# Patient Record
Sex: FEMALE | Race: WHITE | NOT HISPANIC OR LATINO
[De-identification: names, ages, dates, MRNs, and addresses within clinical notes are randomized per-mention and may not be internally consistent; named-entity substitution may affect disease eponyms.]

---

## 2017-01-04 ENCOUNTER — APPOINTMENT (OUTPATIENT)
Dept: ORTHOPEDIC SURGERY | Facility: CLINIC | Age: 82
End: 2017-01-04

## 2017-01-04 VITALS
WEIGHT: 132 LBS | BODY MASS INDEX: 27.71 KG/M2 | DIASTOLIC BLOOD PRESSURE: 86 MMHG | HEART RATE: 101 BPM | HEIGHT: 58 IN | SYSTOLIC BLOOD PRESSURE: 124 MMHG

## 2017-02-15 ENCOUNTER — APPOINTMENT (OUTPATIENT)
Dept: ORTHOPEDIC SURGERY | Facility: CLINIC | Age: 82
End: 2017-02-15

## 2017-02-15 VITALS
SYSTOLIC BLOOD PRESSURE: 123 MMHG | HEART RATE: 62 BPM | HEIGHT: 58 IN | WEIGHT: 132 LBS | DIASTOLIC BLOOD PRESSURE: 79 MMHG | BODY MASS INDEX: 27.71 KG/M2

## 2017-03-06 ENCOUNTER — APPOINTMENT (OUTPATIENT)
Dept: ORTHOPEDIC SURGERY | Facility: CLINIC | Age: 82
End: 2017-03-06

## 2017-03-06 VITALS — HEIGHT: 58 IN | WEIGHT: 132 LBS | BODY MASS INDEX: 27.71 KG/M2

## 2017-03-06 DIAGNOSIS — M18.11 UNILATERAL PRIMARY OSTEOARTHRITIS OF FIRST CARPOMETACARPAL JOINT, RIGHT HAND: ICD-10-CM

## 2017-03-06 DIAGNOSIS — M65.312 TRIGGER THUMB, LEFT THUMB: ICD-10-CM

## 2017-03-06 DIAGNOSIS — M18.12 UNILATERAL PRIMARY OSTEOARTHRITIS OF FIRST CARPOMETACARPAL JOINT, LEFT HAND: ICD-10-CM

## 2017-03-06 DIAGNOSIS — M17.12 UNILATERAL PRIMARY OSTEOARTHRITIS, LEFT KNEE: ICD-10-CM

## 2017-04-17 ENCOUNTER — APPOINTMENT (OUTPATIENT)
Dept: ORTHOPEDIC SURGERY | Facility: CLINIC | Age: 82
End: 2017-04-17

## 2017-04-17 VITALS
BODY MASS INDEX: 27.29 KG/M2 | DIASTOLIC BLOOD PRESSURE: 78 MMHG | HEART RATE: 89 BPM | SYSTOLIC BLOOD PRESSURE: 119 MMHG | HEIGHT: 58 IN | WEIGHT: 130 LBS

## 2017-04-17 DIAGNOSIS — M54.16 RADICULOPATHY, LUMBAR REGION: ICD-10-CM

## 2017-04-17 DIAGNOSIS — M48.06 SPINAL STENOSIS, LUMBAR REGION: ICD-10-CM

## 2017-06-19 ENCOUNTER — APPOINTMENT (OUTPATIENT)
Dept: ORTHOPEDIC SURGERY | Facility: CLINIC | Age: 82
End: 2017-06-19

## 2017-07-12 ENCOUNTER — APPOINTMENT (OUTPATIENT)
Dept: ORTHOPEDIC SURGERY | Facility: CLINIC | Age: 82
End: 2017-07-12

## 2017-07-12 VITALS
HEART RATE: 79 BPM | HEIGHT: 58 IN | WEIGHT: 123 LBS | BODY MASS INDEX: 25.82 KG/M2 | SYSTOLIC BLOOD PRESSURE: 116 MMHG | DIASTOLIC BLOOD PRESSURE: 75 MMHG

## 2017-10-18 ENCOUNTER — APPOINTMENT (OUTPATIENT)
Dept: ORTHOPEDIC SURGERY | Facility: CLINIC | Age: 82
End: 2017-10-18
Payer: MEDICARE

## 2017-10-18 VITALS
BODY MASS INDEX: 26.45 KG/M2 | HEIGHT: 58 IN | SYSTOLIC BLOOD PRESSURE: 124 MMHG | HEART RATE: 86 BPM | WEIGHT: 126 LBS | DIASTOLIC BLOOD PRESSURE: 82 MMHG

## 2017-10-18 DIAGNOSIS — M47.817 SPONDYLOSIS W/OUT MYELOPATHY OR RADICULOPATHY, LUMBOSACRAL REGION: ICD-10-CM

## 2017-10-18 DIAGNOSIS — M70.71 OTHER BURSITIS OF HIP, RIGHT HIP: ICD-10-CM

## 2017-10-18 PROCEDURE — 99213 OFFICE O/P EST LOW 20 MIN: CPT

## 2017-12-11 ENCOUNTER — APPOINTMENT (OUTPATIENT)
Dept: ORTHOPEDIC SURGERY | Facility: CLINIC | Age: 82
End: 2017-12-11

## 2018-03-14 DIAGNOSIS — Z83.79 FAMILY HISTORY OF OTHER DISEASES OF THE DIGESTIVE SYSTEM: ICD-10-CM

## 2018-03-14 DIAGNOSIS — Z82.49 FAMILY HISTORY OF ISCHEMIC HEART DISEASE AND OTHER DISEASES OF THE CIRCULATORY SYSTEM: ICD-10-CM

## 2018-03-14 DIAGNOSIS — Z85.828 PERSONAL HISTORY OF OTHER MALIGNANT NEOPLASM OF SKIN: ICD-10-CM

## 2018-03-14 DIAGNOSIS — Z87.19 PERSONAL HISTORY OF OTHER DISEASES OF THE DIGESTIVE SYSTEM: ICD-10-CM

## 2018-03-14 DIAGNOSIS — R41.3 OTHER AMNESIA: ICD-10-CM

## 2018-03-14 DIAGNOSIS — Z83.3 FAMILY HISTORY OF DIABETES MELLITUS: ICD-10-CM

## 2018-03-14 DIAGNOSIS — Z81.8 FAMILY HISTORY OF OTHER MENTAL AND BEHAVIORAL DISORDERS: ICD-10-CM

## 2018-03-14 DIAGNOSIS — Z83.518 FAMILY HISTORY OF OTHER SPECIFIED EYE DISORDER: ICD-10-CM

## 2018-04-17 ENCOUNTER — APPOINTMENT (OUTPATIENT)
Dept: NEUROLOGY | Facility: CLINIC | Age: 83
End: 2018-04-17
Payer: MEDICARE

## 2018-04-17 VITALS
WEIGHT: 119 LBS | BODY MASS INDEX: 24.98 KG/M2 | HEART RATE: 101 BPM | SYSTOLIC BLOOD PRESSURE: 124 MMHG | HEIGHT: 58 IN | DIASTOLIC BLOOD PRESSURE: 87 MMHG

## 2018-04-17 DIAGNOSIS — H35.30 UNSPECIFIED MACULAR DEGENERATION: ICD-10-CM

## 2018-04-17 PROCEDURE — 96116 NUBHVL XM PHYS/QHP 1ST HR: CPT | Mod: 59

## 2018-04-17 PROCEDURE — 99205 OFFICE O/P NEW HI 60 MIN: CPT | Mod: 25

## 2018-04-27 ENCOUNTER — OTHER (OUTPATIENT)
Age: 83
End: 2018-04-27

## 2018-04-30 ENCOUNTER — APPOINTMENT (OUTPATIENT)
Dept: MRI IMAGING | Facility: CLINIC | Age: 83
End: 2018-04-30
Payer: MEDICARE

## 2018-04-30 ENCOUNTER — OUTPATIENT (OUTPATIENT)
Dept: OUTPATIENT SERVICES | Facility: HOSPITAL | Age: 83
LOS: 1 days | End: 2018-04-30
Payer: MEDICARE

## 2018-04-30 DIAGNOSIS — Z00.8 ENCOUNTER FOR OTHER GENERAL EXAMINATION: ICD-10-CM

## 2018-04-30 DIAGNOSIS — F03.90 UNSPECIFIED DEMENTIA WITHOUT BEHAVIORAL DISTURBANCE: ICD-10-CM

## 2018-04-30 PROCEDURE — 70551 MRI BRAIN STEM W/O DYE: CPT | Mod: 26

## 2018-04-30 PROCEDURE — 70551 MRI BRAIN STEM W/O DYE: CPT

## 2018-07-31 ENCOUNTER — APPOINTMENT (OUTPATIENT)
Dept: NEUROLOGY | Facility: CLINIC | Age: 83
End: 2018-07-31

## 2018-10-22 ENCOUNTER — APPOINTMENT (OUTPATIENT)
Dept: ORTHOPEDIC SURGERY | Facility: CLINIC | Age: 83
End: 2018-10-22

## 2018-12-04 ENCOUNTER — APPOINTMENT (OUTPATIENT)
Dept: INTERNAL MEDICINE | Facility: CLINIC | Age: 83
End: 2018-12-04
Payer: MEDICARE

## 2018-12-04 ENCOUNTER — NON-APPOINTMENT (OUTPATIENT)
Age: 83
End: 2018-12-04

## 2018-12-04 VITALS
BODY MASS INDEX: 22.25 KG/M2 | OXYGEN SATURATION: 98 % | WEIGHT: 106 LBS | SYSTOLIC BLOOD PRESSURE: 130 MMHG | TEMPERATURE: 99 F | HEART RATE: 90 BPM | DIASTOLIC BLOOD PRESSURE: 80 MMHG | HEIGHT: 58 IN

## 2018-12-04 VITALS — SYSTOLIC BLOOD PRESSURE: 112 MMHG | DIASTOLIC BLOOD PRESSURE: 80 MMHG

## 2018-12-04 DIAGNOSIS — M25.562 PAIN IN LEFT KNEE: ICD-10-CM

## 2018-12-04 DIAGNOSIS — Z96.652 PRESENCE OF LEFT ARTIFICIAL KNEE JOINT: ICD-10-CM

## 2018-12-04 DIAGNOSIS — Z96.649 PRESENCE OF UNSPECIFIED ARTIFICIAL HIP JOINT: ICD-10-CM

## 2018-12-04 DIAGNOSIS — M19.90 UNSPECIFIED OSTEOARTHRITIS, UNSPECIFIED SITE: ICD-10-CM

## 2018-12-04 DIAGNOSIS — R73.02 IMPAIRED GLUCOSE TOLERANCE (ORAL): ICD-10-CM

## 2018-12-04 DIAGNOSIS — Z23 ENCOUNTER FOR IMMUNIZATION: ICD-10-CM

## 2018-12-04 DIAGNOSIS — E78.9 DISORDER OF LIPOPROTEIN METABOLISM, UNSPECIFIED: ICD-10-CM

## 2018-12-04 DIAGNOSIS — S32.000S WEDGE COMPRESSION FRACTURE OF UNSPECIFIED LUMBAR VERTEBRA, SEQUELA: ICD-10-CM

## 2018-12-04 DIAGNOSIS — Z86.79 PERSONAL HISTORY OF OTHER DISEASES OF THE CIRCULATORY SYSTEM: ICD-10-CM

## 2018-12-04 DIAGNOSIS — M81.0 AGE-RELATED OSTEOPOROSIS W/OUT CURRENT PATHOLOGICAL FRACTURE: ICD-10-CM

## 2018-12-04 DIAGNOSIS — Z00.00 ENCOUNTER FOR GENERAL ADULT MEDICAL EXAMINATION W/OUT ABNORMAL FINDINGS: ICD-10-CM

## 2018-12-04 DIAGNOSIS — M22.40 CHONDROMALACIA PATELLAE, UNSPECIFIED KNEE: ICD-10-CM

## 2018-12-04 DIAGNOSIS — E55.9 VITAMIN D DEFICIENCY, UNSPECIFIED: ICD-10-CM

## 2018-12-04 PROCEDURE — 82270 OCCULT BLOOD FECES: CPT

## 2018-12-04 PROCEDURE — 90662 IIV NO PRSV INCREASED AG IM: CPT

## 2018-12-04 PROCEDURE — 93000 ELECTROCARDIOGRAM COMPLETE: CPT

## 2018-12-04 PROCEDURE — G0008: CPT

## 2018-12-04 PROCEDURE — G0439: CPT

## 2018-12-05 NOTE — ASSESSMENT
[FreeTextEntry1] : Pt is overdue on all HCM.  She is refusing Pap smear and colonoscopy.  I gave her Rx to repeat mammogram, breast sonogram and DEXA scan.  She was reminded to see ophthalmology, as she has h/o of both glaucoma and macula degeneration, and probably have not follow up for some time.  She was also reminded to have routine dental care and skin exam with dermatologist.

## 2018-12-05 NOTE — HEALTH RISK ASSESSMENT
[Very Good] : ~his/her~  mood as very good [No falls in past year] : Patient reported no falls in the past year [0] : 2) Feeling down, depressed, or hopeless: Not at all (0) [Change in mental status noted] : Change in mental status noted [Alone] : lives alone [Retired] : retired [High School] : high school [] :  [Feels Safe at Home] : Feels safe at home [Fully functional (bathing, dressing, toileting, transferring, walking, feeding)] : Fully functional (bathing, dressing, toileting, transferring, walking, feeding) [Fully functional (using the telephone, shopping, preparing meals, housekeeping, doing laundry, using] : Fully functional and needs no help or supervision to perform IADLs (using the telephone, shopping, preparing meals, housekeeping, doing laundry, using transportation, managing medications and managing finances) [Seat Belt] :  uses seat belt [Discussed at today's visit] : Advance Directives Discussed at today's visit [Relationship: ___] : Relationship: [unfilled] [] : No [Reports changes in hearing] : Reports no changes in hearing [Reports changes in vision] : Reports no changes in vision [Reports changes in dental health] : Reports no changes in dental health [Smoke Detector] : no smoke detector [Carbon Monoxide Detector] : no carbon monoxide detector [ColonoscopyDate] : 12/2008 [de-identified] : eye exam - every 3-4 months,  [de-identified] : dentist - ?2018

## 2018-12-05 NOTE — REVIEW OF SYSTEMS
[Joint Pain] : joint pain [Unsteady Walking] : ataxia [Negative] : Heme/Lymph [Fever] : no fever [Chills] : no chills [Chest Pain] : no chest pain [Palpitations] : no palpitations [Lower Ext Edema] : no lower extremity edema [Shortness Of Breath] : no shortness of breath [Wheezing] : no wheezing [Cough] : no cough [Abdominal Pain] : no abdominal pain [Nausea] : no nausea [Constipation] : no constipation [Diarrhea] : diarrhea [Vomiting] : no vomiting [Heartburn] : no heartburn [Melena] : no melena [Dysuria] : no dysuria [Incontinence] : no incontinence [Nocturia] : no nocturia [Hematuria] : no hematuria [Joint Stiffness] : no joint stiffness [Joint Swelling] : no joint swelling [Muscle Pain] : no muscle pain [Back Pain] : no back pain [Itching] : no itching [Mole Changes] : no mole changes [Skin Rash] : no skin rash [Headache] : no headache [Dizziness] : no dizziness [Fainting] : no fainting [Insomnia] : no insomnia [Anxiety] : no anxiety [Depression] : no depression [Easy Bleeding] : no easy bleeding [Easy Bruising] : no easy bruising [Swollen Glands] : no swollen glands [FreeTextEntry2] : Lost about 20 pounds in the past 2 years, [FreeTextEntry3] : wears reading glasses, [FreeTextEntry9] : Occ hip and knee pain, takes Tylenol as needed, [de-identified] : unsteady only when she's tired,

## 2018-12-05 NOTE — PHYSICAL EXAM
[No Acute Distress] : no acute distress [Well Nourished] : well nourished [Well Developed] : well developed [Normal Sclera/Conjunctiva] : normal sclera/conjunctiva [PERRL] : pupils equal round and reactive to light [EOMI] : extraocular movements intact [Normal Outer Ear/Nose] : the outer ears and nose were normal in appearance [Normal Oropharynx] : the oropharynx was normal [Normal TMs] : both tympanic membranes were normal [Normal Nasal Mucosa] : the nasal mucosa was normal [No JVD] : no jugular venous distention [Supple] : supple [No Lymphadenopathy] : no lymphadenopathy [No Respiratory Distress] : no respiratory distress  [Clear to Auscultation] : lungs were clear to auscultation bilaterally [Normal Rate] : normal rate  [Regular Rhythm] : with a regular rhythm [Normal S1, S2] : normal S1 and S2 [No Carotid Bruits] : no carotid bruits [Pedal Pulses Present] : the pedal pulses are present [No Edema] : there was no peripheral edema [No Extremity Clubbing/Cyanosis] : no extremity clubbing/cyanosis [Normal Appearance] : normal in appearance [No Masses] : no palpable masses [No Nipple Discharge] : no nipple discharge [No Axillary Lymphadenopathy] : no axillary lymphadenopathy [Soft] : abdomen soft [Non Tender] : non-tender [Non-distended] : non-distended [Normal Bowel Sounds] : normal bowel sounds [Normal Sphincter Tone] : normal sphincter tone [No Mass] : no mass [Normal Supraclavicular Nodes] : no supraclavicular lymphadenopathy [Normal Axillary Nodes] : no axillary lymphadenopathy [Normal Posterior Cervical Nodes] : no posterior cervical lymphadenopathy [Normal Anterior Cervical Nodes] : no anterior cervical lymphadenopathy [No CVA Tenderness] : no CVA  tenderness [No Spinal Tenderness] : no spinal tenderness [No Joint Swelling] : no joint swelling [Grossly Normal Strength/Tone] : grossly normal strength/tone [No Rash] : no rash [Normal Gait] : normal gait [Coordination Grossly Intact] : coordination grossly intact [No Focal Deficits] : no focal deficits [Speech Grossly Normal] : speech grossly normal [Normal Affect] : the affect was normal [Alert and Oriented x3] : oriented to person, place, and time [Normal Mood] : the mood was normal [Stool Occult Blood] : stool negative for occult blood [de-identified] : Elderly female,

## 2018-12-05 NOTE — HISTORY OF PRESENT ILLNESS
[Other: _____] : [unfilled] [de-identified] : Pt presented for PE.  Last PE was 2 years ago in 9/2016.\par \par In the last few months, pt has been having more problem with memory. She is now under the care of a neurologist and diagnosed with dementia, she will follow up in 2 weeks for possible medication.  Her daughter in law in now going to accompany her to all doctor's visit.  They are trying to see if her long term care plan will allow hiring of a HHA.\par \par Pt has not sen me for 2 years, she thinks that she sees her ophthalmologist every 3-4 months, but she may not have been there for sometime.  She is not taking any medications at all at the present time.  She doesn't even use her eye drops, even though they are in her refrigerator.\par \par Pt lost some weight in the past 2 years, but does not know how much.\par \par She is active and denied any discomfort.  She likes to go out dancing.  She was still driving herself to go dancing until the last couple of months.  the patient has OA, and sees ortho quite often, according to electronic records, but she does not remember going there that often.\par \par She would like to have Flu vaccine today.

## 2018-12-13 ENCOUNTER — LABORATORY RESULT (OUTPATIENT)
Age: 83
End: 2018-12-13

## 2018-12-14 LAB
25(OH)D3 SERPL-MCNC: 24.1 NG/ML
ALBUMIN SERPL ELPH-MCNC: 4 G/DL
ALP BLD-CCNC: 78 U/L
ALT SERPL-CCNC: 16 U/L
ANION GAP SERPL CALC-SCNC: 6 MMOL/L
AST SERPL-CCNC: 15 U/L
BASOPHILS # BLD AUTO: 0.01 K/UL
BASOPHILS NFR BLD AUTO: 0.3 %
BILIRUB SERPL-MCNC: 0.5 MG/DL
BUN SERPL-MCNC: 15 MG/DL
CALCIUM SERPL-MCNC: 9.3 MG/DL
CHLORIDE SERPL-SCNC: 104 MMOL/L
CHOLEST SERPL-MCNC: 169 MG/DL
CHOLEST/HDLC SERPL: 2.8 RATIO
CO2 SERPL-SCNC: 27 MMOL/L
CREAT SERPL-MCNC: 0.58 MG/DL
EOSINOPHIL # BLD AUTO: 0.03 K/UL
EOSINOPHIL NFR BLD AUTO: 0.8 %
GLUCOSE SERPL-MCNC: 109 MG/DL
HBA1C MFR BLD HPLC: 5.4 %
HCT VFR BLD CALC: 40.7 %
HDLC SERPL-MCNC: 60 MG/DL
HGB BLD-MCNC: 13.3 G/DL
IMM GRANULOCYTES NFR BLD AUTO: 0 %
LDLC SERPL CALC-MCNC: 88 MG/DL
LYMPHOCYTES # BLD AUTO: 0.96 K/UL
LYMPHOCYTES NFR BLD AUTO: 25.2 %
MAN DIFF?: NORMAL
MCHC RBC-ENTMCNC: 29.8 PG
MCHC RBC-ENTMCNC: 32.7 GM/DL
MCV RBC AUTO: 91.1 FL
MONOCYTES # BLD AUTO: 0.28 K/UL
MONOCYTES NFR BLD AUTO: 7.3 %
NEUTROPHILS # BLD AUTO: 2.53 K/UL
NEUTROPHILS NFR BLD AUTO: 66.4 %
PLATELET # BLD AUTO: 175 K/UL
POTASSIUM SERPL-SCNC: 3.9 MMOL/L
PROT SERPL-MCNC: 6.1 G/DL
RBC # BLD: 4.47 M/UL
RBC # FLD: 13.5 %
SODIUM SERPL-SCNC: 137 MMOL/L
TRIGL SERPL-MCNC: 104 MG/DL
TSH SERPL-ACNC: 1.25 UIU/ML
VIT B12 SERPL-MCNC: 192 PG/ML
WBC # FLD AUTO: 3.81 K/UL

## 2018-12-17 LAB — RPR SER-TITR: NORMAL

## 2018-12-19 ENCOUNTER — APPOINTMENT (OUTPATIENT)
Dept: NEUROLOGY | Facility: CLINIC | Age: 83
End: 2018-12-19
Payer: MEDICARE

## 2018-12-19 VITALS
HEIGHT: 58 IN | DIASTOLIC BLOOD PRESSURE: 79 MMHG | BODY MASS INDEX: 22.25 KG/M2 | HEART RATE: 79 BPM | SYSTOLIC BLOOD PRESSURE: 135 MMHG | WEIGHT: 106 LBS

## 2018-12-19 DIAGNOSIS — F32.9 MAJOR DEPRESSIVE DISORDER, SINGLE EPISODE, UNSPECIFIED: ICD-10-CM

## 2018-12-19 DIAGNOSIS — F02.80 ALZHEIMER'S DISEASE WITH LATE ONSET: ICD-10-CM

## 2018-12-19 DIAGNOSIS — E53.8 DEFICIENCY OF OTHER SPECIFIED B GROUP VITAMINS: ICD-10-CM

## 2018-12-19 DIAGNOSIS — E55.9 VITAMIN D DEFICIENCY, UNSPECIFIED: ICD-10-CM

## 2018-12-19 DIAGNOSIS — G30.1 ALZHEIMER'S DISEASE WITH LATE ONSET: ICD-10-CM

## 2018-12-19 PROCEDURE — 99215 OFFICE O/P EST HI 40 MIN: CPT

## 2018-12-19 RX ORDER — DONEPEZIL HYDROCHLORIDE 5 MG/1
5 TABLET ORAL
Qty: 30 | Refills: 5 | Status: ACTIVE | COMMUNITY
Start: 2018-12-19 | End: 1900-01-01

## 2019-02-01 ENCOUNTER — TRANSCRIPTION ENCOUNTER (OUTPATIENT)
Age: 84
End: 2019-02-01

## 2019-02-01 ENCOUNTER — INPATIENT (INPATIENT)
Facility: HOSPITAL | Age: 84
LOS: 5 days | Discharge: NURSING, REHAB OR LONG-TERM CARE | DRG: 480 | End: 2019-02-07
Attending: ORTHOPAEDIC SURGERY | Admitting: ORTHOPAEDIC SURGERY
Payer: MEDICARE

## 2019-02-01 VITALS
SYSTOLIC BLOOD PRESSURE: 127 MMHG | WEIGHT: 104.94 LBS | RESPIRATION RATE: 18 BRPM | HEART RATE: 85 BPM | HEIGHT: 66 IN | OXYGEN SATURATION: 96 % | DIASTOLIC BLOOD PRESSURE: 66 MMHG

## 2019-02-01 DIAGNOSIS — Z96.652 PRESENCE OF LEFT ARTIFICIAL KNEE JOINT: Chronic | ICD-10-CM

## 2019-02-01 DIAGNOSIS — S72.002A FRACTURE OF UNSPECIFIED PART OF NECK OF LEFT FEMUR, INITIAL ENCOUNTER FOR CLOSED FRACTURE: ICD-10-CM

## 2019-02-01 DIAGNOSIS — Z96.641 PRESENCE OF RIGHT ARTIFICIAL HIP JOINT: Chronic | ICD-10-CM

## 2019-02-01 LAB
ALBUMIN SERPL ELPH-MCNC: 3.7 G/DL — SIGNIFICANT CHANGE UP (ref 3.3–5)
ALP SERPL-CCNC: 74 U/L — SIGNIFICANT CHANGE UP (ref 40–120)
ALT FLD-CCNC: 15 U/L — SIGNIFICANT CHANGE UP (ref 10–45)
ANION GAP SERPL CALC-SCNC: 12 MMOL/L — SIGNIFICANT CHANGE UP (ref 5–17)
APTT BLD: 28.6 SEC — SIGNIFICANT CHANGE UP (ref 27.5–36.3)
AST SERPL-CCNC: 9 U/L — LOW (ref 10–40)
BASE EXCESS BLDV CALC-SCNC: 6 MMOL/L — HIGH (ref -2–2)
BASOPHILS # BLD AUTO: 0 K/UL — SIGNIFICANT CHANGE UP (ref 0–0.2)
BASOPHILS NFR BLD AUTO: 0.4 % — SIGNIFICANT CHANGE UP (ref 0–2)
BILIRUB SERPL-MCNC: 0.4 MG/DL — SIGNIFICANT CHANGE UP (ref 0.2–1.2)
BLD GP AB SCN SERPL QL: NEGATIVE — SIGNIFICANT CHANGE UP
BUN SERPL-MCNC: 14 MG/DL — SIGNIFICANT CHANGE UP (ref 7–23)
CA-I SERPL-SCNC: 1.17 MMOL/L — SIGNIFICANT CHANGE UP (ref 1.12–1.3)
CALCIUM SERPL-MCNC: 8.9 MG/DL — SIGNIFICANT CHANGE UP (ref 8.4–10.5)
CHLORIDE BLDV-SCNC: 107 MMOL/L — SIGNIFICANT CHANGE UP (ref 96–108)
CHLORIDE SERPL-SCNC: 103 MMOL/L — SIGNIFICANT CHANGE UP (ref 96–108)
CO2 BLDV-SCNC: 33 MMOL/L — HIGH (ref 22–30)
CO2 SERPL-SCNC: 27 MMOL/L — SIGNIFICANT CHANGE UP (ref 22–31)
CREAT SERPL-MCNC: 0.58 MG/DL — SIGNIFICANT CHANGE UP (ref 0.5–1.3)
EOSINOPHIL # BLD AUTO: 0.1 K/UL — SIGNIFICANT CHANGE UP (ref 0–0.5)
EOSINOPHIL NFR BLD AUTO: 0.9 % — SIGNIFICANT CHANGE UP (ref 0–6)
GAS PNL BLDV: 135 MMOL/L — LOW (ref 136–145)
GAS PNL BLDV: SIGNIFICANT CHANGE UP
GAS PNL BLDV: SIGNIFICANT CHANGE UP
GLUCOSE BLDV-MCNC: 124 MG/DL — HIGH (ref 70–99)
GLUCOSE SERPL-MCNC: 135 MG/DL — HIGH (ref 70–99)
HCO3 BLDV-SCNC: 31 MMOL/L — HIGH (ref 21–29)
HCT VFR BLD CALC: 38 % — SIGNIFICANT CHANGE UP (ref 34.5–45)
HCT VFR BLDA CALC: 39 % — SIGNIFICANT CHANGE UP (ref 39–50)
HGB BLD CALC-MCNC: 12.7 G/DL — SIGNIFICANT CHANGE UP (ref 11.5–15.5)
HGB BLD-MCNC: 13.1 G/DL — SIGNIFICANT CHANGE UP (ref 11.5–15.5)
INR BLD: 1.14 RATIO — SIGNIFICANT CHANGE UP (ref 0.88–1.16)
LACTATE BLDV-MCNC: 1.2 MMOL/L — SIGNIFICANT CHANGE UP (ref 0.7–2)
LYMPHOCYTES # BLD AUTO: 1 K/UL — SIGNIFICANT CHANGE UP (ref 1–3.3)
LYMPHOCYTES # BLD AUTO: 14.8 % — SIGNIFICANT CHANGE UP (ref 13–44)
MCHC RBC-ENTMCNC: 31.5 PG — SIGNIFICANT CHANGE UP (ref 27–34)
MCHC RBC-ENTMCNC: 34.4 GM/DL — SIGNIFICANT CHANGE UP (ref 32–36)
MCV RBC AUTO: 91.5 FL — SIGNIFICANT CHANGE UP (ref 80–100)
MONOCYTES # BLD AUTO: 0.4 K/UL — SIGNIFICANT CHANGE UP (ref 0–0.9)
MONOCYTES NFR BLD AUTO: 6.6 % — SIGNIFICANT CHANGE UP (ref 2–14)
NEUTROPHILS # BLD AUTO: 5.2 K/UL — SIGNIFICANT CHANGE UP (ref 1.8–7.4)
NEUTROPHILS NFR BLD AUTO: 77.3 % — HIGH (ref 43–77)
PCO2 BLDV: 49 MMHG — SIGNIFICANT CHANGE UP (ref 35–50)
PH BLDV: 7.42 — SIGNIFICANT CHANGE UP (ref 7.35–7.45)
PLATELET # BLD AUTO: 195 K/UL — SIGNIFICANT CHANGE UP (ref 150–400)
PO2 BLDV: 24 MMHG — LOW (ref 25–45)
POTASSIUM BLDV-SCNC: 3.7 MMOL/L — SIGNIFICANT CHANGE UP (ref 3.5–5.3)
POTASSIUM SERPL-MCNC: 3.9 MMOL/L — SIGNIFICANT CHANGE UP (ref 3.5–5.3)
POTASSIUM SERPL-SCNC: 3.9 MMOL/L — SIGNIFICANT CHANGE UP (ref 3.5–5.3)
PROT SERPL-MCNC: 6.1 G/DL — SIGNIFICANT CHANGE UP (ref 6–8.3)
PROTHROM AB SERPL-ACNC: 13.1 SEC — HIGH (ref 10–12.9)
RBC # BLD: 4.16 M/UL — SIGNIFICANT CHANGE UP (ref 3.8–5.2)
RBC # FLD: 12 % — SIGNIFICANT CHANGE UP (ref 10.3–14.5)
RH IG SCN BLD-IMP: POSITIVE — SIGNIFICANT CHANGE UP
SAO2 % BLDV: 40 % — LOW (ref 67–88)
SODIUM SERPL-SCNC: 142 MMOL/L — SIGNIFICANT CHANGE UP (ref 135–145)
WBC # BLD: 6.8 K/UL — SIGNIFICANT CHANGE UP (ref 3.8–10.5)
WBC # FLD AUTO: 6.8 K/UL — SIGNIFICANT CHANGE UP (ref 3.8–10.5)

## 2019-02-01 PROCEDURE — 99285 EMERGENCY DEPT VISIT HI MDM: CPT | Mod: GC

## 2019-02-01 PROCEDURE — 71045 X-RAY EXAM CHEST 1 VIEW: CPT | Mod: 26

## 2019-02-01 PROCEDURE — 76377 3D RENDER W/INTRP POSTPROCES: CPT | Mod: 26

## 2019-02-01 PROCEDURE — 72192 CT PELVIS W/O DYE: CPT | Mod: 26

## 2019-02-01 PROCEDURE — 73502 X-RAY EXAM HIP UNI 2-3 VIEWS: CPT | Mod: 26,LT

## 2019-02-01 PROCEDURE — 73552 X-RAY EXAM OF FEMUR 2/>: CPT | Mod: 26,LT

## 2019-02-01 RX ORDER — MORPHINE SULFATE 50 MG/1
2 CAPSULE, EXTENDED RELEASE ORAL ONCE
Qty: 0 | Refills: 0 | Status: DISCONTINUED | OUTPATIENT
Start: 2019-02-01 | End: 2019-02-01

## 2019-02-01 RX ORDER — ACETAMINOPHEN 500 MG
650 TABLET ORAL EVERY 6 HOURS
Qty: 0 | Refills: 0 | Status: DISCONTINUED | OUTPATIENT
Start: 2019-02-01 | End: 2019-02-02

## 2019-02-01 RX ORDER — OXYCODONE HYDROCHLORIDE 5 MG/1
2.5 TABLET ORAL EVERY 4 HOURS
Qty: 0 | Refills: 0 | Status: DISCONTINUED | OUTPATIENT
Start: 2019-02-01 | End: 2019-02-02

## 2019-02-01 RX ORDER — SODIUM CHLORIDE 9 MG/ML
1000 INJECTION, SOLUTION INTRAVENOUS
Qty: 0 | Refills: 0 | Status: DISCONTINUED | OUTPATIENT
Start: 2019-02-01 | End: 2019-02-02

## 2019-02-01 RX ORDER — DIPHENHYDRAMINE HCL 50 MG
25 CAPSULE ORAL EVERY 8 HOURS
Qty: 0 | Refills: 0 | Status: DISCONTINUED | OUTPATIENT
Start: 2019-02-01 | End: 2019-02-02

## 2019-02-01 RX ORDER — ONDANSETRON 8 MG/1
4 TABLET, FILM COATED ORAL EVERY 6 HOURS
Qty: 0 | Refills: 0 | Status: DISCONTINUED | OUTPATIENT
Start: 2019-02-01 | End: 2019-02-02

## 2019-02-01 RX ORDER — TRAMADOL HYDROCHLORIDE 50 MG/1
50 TABLET ORAL EVERY 6 HOURS
Qty: 0 | Refills: 0 | Status: DISCONTINUED | OUTPATIENT
Start: 2019-02-01 | End: 2019-02-02

## 2019-02-01 RX ORDER — LANOLIN ALCOHOL/MO/W.PET/CERES
3 CREAM (GRAM) TOPICAL AT BEDTIME
Qty: 0 | Refills: 0 | Status: DISCONTINUED | OUTPATIENT
Start: 2019-02-01 | End: 2019-02-02

## 2019-02-01 RX ORDER — DOCUSATE SODIUM 100 MG
100 CAPSULE ORAL THREE TIMES A DAY
Qty: 0 | Refills: 0 | Status: DISCONTINUED | OUTPATIENT
Start: 2019-02-01 | End: 2019-02-02

## 2019-02-01 RX ORDER — ACETAMINOPHEN 500 MG
1000 TABLET ORAL ONCE
Qty: 0 | Refills: 0 | Status: COMPLETED | OUTPATIENT
Start: 2019-02-01 | End: 2019-02-02

## 2019-02-01 RX ORDER — CHLORHEXIDINE GLUCONATE 213 G/1000ML
1 SOLUTION TOPICAL ONCE
Qty: 0 | Refills: 0 | Status: COMPLETED | OUTPATIENT
Start: 2019-02-01 | End: 2019-02-02

## 2019-02-01 RX ORDER — OXYCODONE HYDROCHLORIDE 5 MG/1
5 TABLET ORAL EVERY 4 HOURS
Qty: 0 | Refills: 0 | Status: DISCONTINUED | OUTPATIENT
Start: 2019-02-01 | End: 2019-02-02

## 2019-02-01 RX ORDER — HEPARIN SODIUM 5000 [USP'U]/ML
5000 INJECTION INTRAVENOUS; SUBCUTANEOUS ONCE
Qty: 0 | Refills: 0 | Status: COMPLETED | OUTPATIENT
Start: 2019-02-01 | End: 2019-02-01

## 2019-02-01 RX ORDER — SODIUM CHLORIDE 9 MG/ML
1000 INJECTION INTRAMUSCULAR; INTRAVENOUS; SUBCUTANEOUS
Qty: 0 | Refills: 0 | Status: DISCONTINUED | OUTPATIENT
Start: 2019-02-01 | End: 2019-02-02

## 2019-02-01 RX ADMIN — HEPARIN SODIUM 5000 UNIT(S): 5000 INJECTION INTRAVENOUS; SUBCUTANEOUS at 23:59

## 2019-02-01 RX ADMIN — MORPHINE SULFATE 2 MILLIGRAM(S): 50 CAPSULE, EXTENDED RELEASE ORAL at 18:32

## 2019-02-01 RX ADMIN — MORPHINE SULFATE 2 MILLIGRAM(S): 50 CAPSULE, EXTENDED RELEASE ORAL at 19:28

## 2019-02-01 RX ADMIN — SODIUM CHLORIDE 200 MILLILITER(S): 9 INJECTION INTRAMUSCULAR; INTRAVENOUS; SUBCUTANEOUS at 18:34

## 2019-02-01 RX ADMIN — Medication 100 MILLIGRAM(S): at 23:59

## 2019-02-01 RX ADMIN — OXYCODONE HYDROCHLORIDE 5 MILLIGRAM(S): 5 TABLET ORAL at 22:33

## 2019-02-01 NOTE — CONSULT NOTE ADULT - SUBJECTIVE AND OBJECTIVE BOX
Patient is a 83y old  Female who presents with a chief complaint of Left hip fracture (01 Feb 2019 21:21)      HPI:  83y [facility] severe left hip pain and inability to ambulate.  Patient denies headstrike or LOC. Pt aide witnessed the fall, she states pt tripped over her feet and landed onto her L hip. Localizes pain to Left hip/femur. Patient denies radiation of pain. Patient denies numbness/tingling/burning in the LLE. No other bone/joint complaints. Patient is a community ambulator at baseline without assistive devices. Patient has no issues w/ADL's 2/2 dementia.         MEDICATIONS  (STANDING):  acetaminophen  IVPB .. 1000 milliGRAM(s) IV Intermittent once  chlorhexidine 4% Liquid 1 Application(s) Topical once  docusate sodium 100 milliGRAM(s) Oral three times a day  lactated ringers. 1000 milliLiter(s) (100 mL/Hr) IV Continuous <Continuous>  sodium chloride 0.9%. 1000 milliLiter(s) (200 mL/Hr) IV Continuous <Continuous>    MEDICATIONS  (PRN):  acetaminophen   Tablet .. 650 milliGRAM(s) Oral every 6 hours PRN Temp greater or equal to 38C (100.4F)  acetaminophen   Tablet .. 650 milliGRAM(s) Oral every 6 hours PRN Mild Pain (1 - 3)  diphenhydrAMINE 25 milliGRAM(s) Oral every 8 hours PRN Itching  melatonin 3 milliGRAM(s) Oral at bedtime PRN Insomnia  ondansetron Injectable 4 milliGRAM(s) IV Push every 6 hours PRN Nausea and/or Vomiting  oxyCODONE    IR 2.5 milliGRAM(s) Oral every 4 hours PRN Moderate Pain (4 - 6)  oxyCODONE    IR 5 milliGRAM(s) Oral every 4 hours PRN Severe Pain (7 - 10)  traMADol 50 milliGRAM(s) Oral every 6 hours PRN Mild Pain (1 - 3)      Allergies    No Known Allergies    Intolerances      PAST MEDICAL HISTORY:  Alzheimer disease    PAST SURGICAL HISTORY:  H/O total hip arthroplasty, right  History of total knee arthroplasty, left  No significant past surgical history      Diet:  ( x  ) Regular   (   ) Low Sodium   (   ) Low Cholesterol   (   ) Diabetic   (   ) Other    FAMILY HISTORY:  No pertinent family history in first degree relatives      SOCIAL HISTORY:    Tobacco Usage:  (   ) never smoked   ( x  ) former smoker   (   ) current smoker  (   ) pack years  (   ) last cigarette date  Alcohol Usage: none   Advanced Directives: (   ) None    (   ) DNR    (   ) DNI    (   ) Health Care Proxy:     REVIEW OF SYSTEM:  CONSTITUTIONAL: No fever, No change in weight, No fatigue  HEAD: No headache, No dizziness, No recent trauma  EYES: No eye pain, No visual disturbances, No discharge  ENT:  No difficulty hearing, No tinnitus, No vertigo, No sinus pain, No throat pain  NECK: No pain, No stiffness  BREASTS: No pain, No masses, No nipple discharge  RESPIRATORY: No cough, No wheezing, No chills, No hemoptysis, No shortness of breath at rest or exertional shortness of breath  CARDIOVASCULAR: No chest pain, No palpitations, No dizziness, No CHF, No arrhythmia, No cardiomegaly, No leg swelling  GASTROINTESTINAL: No abdominal, No epigastric pain. No nausea, No vomiting, No hematemesis, No diarrhea, No constipation. No melena, No hematochezia. No GERD  GENITOURINARY: No dysuria, No frequency, No hematuria, No incontinence, No nocturia, No hesitancy,  SKIN: No itching, No burning, No rashes, No lesions   LYMPH NODES: No history of enlarged glands  ENDOCRINE: No heat or cold intolerance, No hair loss. No osteoporosis, No thyroid disease  MUSCULOSKELETAL: No joint pain or swelling, No muscle, back, or extremity pain  (+) LEFT HIP PAIN  PSYCHIATRIC: No depression, No anxiety, No mood swings, No difficulty sleeping  HEME/LYMPH: No easy bruising, No anticoagulants, No bleeding disorder, No bleeding gums  ALLERGY AND IMMUNOLOGIC: No hives, No eczema  NEUROLOGICAL: No memory loss, No loss of strength, No numbness, No tremors    VITALS:  Vital Signs Last 24 Hrs  T(C): 37.5 (01 Feb 2019 23:37), Max: 37.5 (01 Feb 2019 23:37)  T(F): 99.5 (01 Feb 2019 23:37), Max: 99.5 (01 Feb 2019 23:37)  HR: 93 (01 Feb 2019 23:37) (81 - 98)  BP: 134/84 (01 Feb 2019 23:37) (127/66 - 151/89)  BP(mean): --  RR: 18 (01 Feb 2019 23:37) (16 - 18)  SpO2: 96% (01 Feb 2019 23:37) (96% - 100%)  I&O's Summary      PHYSICAL EXAM:  GENERAL: NAD, well nourished (+) DEMENTIA  HEAD:  Atraumatic  EYES: EOM, PERRLA, conjunctiva pink and sclera white  ENT: No tonsillar erythema, exudates, or enlargement, moist mucous membranes, good dentition, no lesions  NECK: Supple, No JVD, normal thyroid, carotids with normal upstrokes and no bruits  CHEST/LUNG: Clear to auscultation bilaterally, No rales, rhonchi, wheezing, or rubs  HEART: Regular rate and rhythm, No murmurs, rubs, or gallops  ABDOMEN: Soft, nondistended, no masses, guarding, tenderness or rebound, bowel sounds present  EXTREMITIES:  2+ Peripheral Pulses, No clubbing, cyanosis, or edema. LEFT HIP FRACTURE  LYMPH: No lymphadenopathy noted  SKIN: No rashes or lesions  NERVOUS SYSTEM:  Alert & Oriented X3, normal cognitive function. Motor Strength 5/5 right upper and right lower.  5/5 left upper and left lower extremities, DTRs 2+ intact and symmetric    LABS:  ekg    NSR NO ACUTE CHANGES    CXR;  CLEAR LUNGS  LUCENCINY LEFT HUMERAL HEAD R/O FRACTURE    CBC Full  -  ( 01 Feb 2019 18:42 )  WBC Count : 6.8 K/uL  Hemoglobin : 13.1 g/dL  Hematocrit : 38.0 %  Platelet Count - Automated : 195 K/uL  Mean Cell Volume : 91.5 fl  Mean Cell Hemoglobin : 31.5 pg  Mean Cell Hemoglobin Concentration : 34.4 gm/dL  Auto Neutrophil # : 5.2 K/uL  Auto Lymphocyte # : 1.0 K/uL  Auto Monocyte # : 0.4 K/uL  Auto Eosinophil # : 0.1 K/uL  Auto Basophil # : 0.0 K/uL  Auto Neutrophil % : 77.3 %  Auto Lymphocyte % : 14.8 %  Auto Monocyte % : 6.6 %  Auto Eosinophil % : 0.9 %  Auto Basophil % : 0.4 %    02-01    142  |  103  |  14  ----------------------------<  135<H>  3.9   |  27  |  0.58    Ca    8.9      01 Feb 2019 18:42    TPro  6.1  /  Alb  3.7  /  TBili  0.4  /  DBili  x   /  AST  9<L>  /  ALT  15  /  AlkPhos  74  02-01    LIVER FUNCTIONS - ( 01 Feb 2019 18:42 )  Alb: 3.7 g/dL / Pro: 6.1 g/dL / ALK PHOS: 74 U/L / ALT: 15 U/L / AST: 9 U/L / GGT: x           PT/INR - ( 01 Feb 2019 18:42 )   PT: 13.1 sec;   INR: 1.14 ratio         PTT - ( 01 Feb 2019 18:42 )  PTT:28.6 sec    CAPILLARY BLOOD GLUCOSE          RADIOLOGY & ADDITIONAL TESTS:    Consultant(s):    Care Discussed with Consultants/Other Providers [ ] YES  [ ] NO

## 2019-02-01 NOTE — CONSULT NOTE ADULT - PROBLEM/RECOMMENDATION-1
Anesthesia Evaluation     history of anesthetic complications: PONV  NPO Solid Status: > 8 hours  NPO Liquid Status: > 8 hours           Airway   Mallampati: I  TM distance: >3 FB  Neck ROM: full  Dental      Pulmonary - normal exam   (+) recent URI resolved,   Cardiovascular - normal exam    (+) hyperlipidemia,       Neuro/Psych  GI/Hepatic/Renal/Endo      Musculoskeletal     Abdominal    Substance History      OB/GYN          Other                        Anesthesia Plan    ASA 2       total IV anesthesia  intravenous induction   Anesthetic plan and risks discussed with patient.      
DISPLAY PLAN FREE TEXT

## 2019-02-01 NOTE — ED PROVIDER NOTE - OBJECTIVE STATEMENT
83F w/ pmh alzhemier dementia, no other pmh or psh p/w L hip pain s/p fall - was at home with daughter - and 83F w/ pmh alzhemier dementia, no other pmh or psh p/w L hip pain s/p fall - was at home with daughter - walking quickly, tripped accidentally over a chair and fell forward onto L hip - witnessed by daughter, no head trauma or other trauma, no LOC; denies pain elsewhere besides L hip, not in knee / ankle. No recent travel, medication change, illness, or hospitalization.

## 2019-02-01 NOTE — CONSULT NOTE ADULT - ASSESSMENT
83y Female [facility] left hip pain and inability to ambulate.  Patient denies headstrike or LOC. Pt aide witnessed the fall, she states pt tripped over her feet and landed onto her L hip. Localizes pain to Left hip/femur. Patient denies radiation of pain. Patient denies numbness/tingling/burning in the LLE. No other bone/joint complaints. Patient is a community ambulator at baseline without assistive devices. Patient has no issues w/ADL's 2/2 dementia. (01 Feb 2019 21:21  MEDICALLY STABLE TO PROCEED WITH SURGERY AS PLANNED.  OBTAIN LEFT SHOULDER XRAYS AS CHEST  FILM SUSPICIOUS FOR A LUCENCY SIGNIFYING  A POSSIBLE FRACTURE

## 2019-02-01 NOTE — ED PROVIDER NOTE - MEDICAL DECISION MAKING DETAILS
83F w/ [facility] fall - L hip pain - r/o fracture, give pain meds, reassess 83F w/ [facility] fall at home witnessed by home attendant injury pain tender left hip area unable to raise left leg no distal neuro vascular deficit No signs of head injury no chest wall tenderness Lungs clear heart regular   - L hip pain - on xray left femur intertroch fracture  fracture , give pain meds, admit --Sidhu

## 2019-02-01 NOTE — H&P ADULT - HISTORY OF PRESENT ILLNESS
83y Female [facility] left hip pain and inability to ambulate.  Patient denies headstrike or LOC. Pt aide witnessed the fall, she states pt tripped over her feet and landed onto her L hip. Localizes pain to Left hip/femur. Patient denies radiation of pain. Patient denies numbness/tingling/burning in the LLE. No other bone/joint complaints. Patient is a community ambulator at baseline without assistive devices. Patient has no issues w/ADL's 2/2 dementia.

## 2019-02-01 NOTE — ED ADULT NURSE REASSESSMENT NOTE - NS ED NURSE REASSESS COMMENT FT1
Bed assignment received. Called 7 tower to give pt report to nurse, per floor receiving RN unavailable to receive report at this time. Floor aware transport from floor will not be delayed, stated RN will call back for report when she is available. ED Charge RN aware of situation.
Ortho at bedside to see pt
Report received from ELO Blackwood. Pt resting comfortably with aide at bedside. Awaiting CT scan. VSS, reports no current pain. Safety maintained at all times, bed in lowest position, call bell in reach. Will continue to monitor closely.
Pt reports pain 10/10 in L hip. PRN pain medication administered as ordered. VSS. Awaiting transport to bed assignment.

## 2019-02-01 NOTE — CONSULT NOTE ADULT - PROBLEM SELECTOR RECOMMENDATION 2
Patient seen and examined labs and xrays reviewed.  THERE IS NO MEDICAL CONTRAINDICATION TO SURGERY AS PLANNED.

## 2019-02-01 NOTE — H&P ADULT - ASSESSMENT
83y Female with L Intertrochanteric hip fracture  - admit to ortho  - plan for IM nail tomorrow with Dr Clay  - Pain control  - NPO/IVF after midnight  - hold all chemical dvt ppx after midnight  - CBC/BMP/Coags/UA/T+S x2  - EKG/CXR  - Medical clearance  - bed rest

## 2019-02-01 NOTE — ED ADULT NURSE NOTE - OBJECTIVE STATEMENT
83 yrs old female was brought to the ER via ambulance for s/p fall. As per aide pt was upset after she was not able to locate her pocket bag at home was walking fast to her bedroom when she tripped and fell. Aide report that she witnessed the fall and pt fell on her left side. Aide denies pt hitting her head or LOC. Skin abrasion noted to right arm . Pt reports pain level of 10/10 only upon movement.

## 2019-02-01 NOTE — ED PROVIDER NOTE - PHYSICAL EXAMINATION
*GEN:   comfortable, in no acute distress, AOx3  *EYES:   pupils equally round and reactive to light, extra-occular movements intact  *HEENT:   airway patent, moist mucosal membranes  *CV:   regular rate and rhythm  *RESP:   clear to auscultation bilaterally, non-labored  *ABD:   soft, non-tender  *:   no cva/flank tenderness  *MSK:   L hip tenderness, severely limited ROM, good pulses / neurovascular status throughout  *SKIN:   dry, intact  *NEURO:   AOx3, cranial nerves intact throughout, strength 5/5, no focal loss of sensation, no pronator drift, finger/nose normal, ambulating w/ normal gait

## 2019-02-01 NOTE — ED PROVIDER NOTE - ATTENDING CONTRIBUTION TO CARE
I have seen and evaluated this patient with the resident.   I agree with the findings  unless other wise stated.  I have made appropriate changes in documentations where needed, After my face to face bedside evaluation, I am further  notinF w/ [facility] fall at home witnessed by home attendant injury pain tender left hip area unable to raise left leg no distal neuro vascular deficit No signs of head injury no chest wall tenderness Lungs clear heart regular   - L hip pain - on xray left femur intertroch fracture  fracture , give pain meds, admit --Sidhu

## 2019-02-02 DIAGNOSIS — G30.9 ALZHEIMER'S DISEASE, UNSPECIFIED: ICD-10-CM

## 2019-02-02 DIAGNOSIS — M25.512 PAIN IN LEFT SHOULDER: ICD-10-CM

## 2019-02-02 DIAGNOSIS — S72.002A FRACTURE OF UNSPECIFIED PART OF NECK OF LEFT FEMUR, INITIAL ENCOUNTER FOR CLOSED FRACTURE: ICD-10-CM

## 2019-02-02 LAB
ANION GAP SERPL CALC-SCNC: 10 MMOL/L — SIGNIFICANT CHANGE UP (ref 5–17)
ANION GAP SERPL CALC-SCNC: 13 MMOL/L — SIGNIFICANT CHANGE UP (ref 5–17)
APTT BLD: 34.4 SEC — SIGNIFICANT CHANGE UP (ref 27.5–36.3)
BLD GP AB SCN SERPL QL: NEGATIVE — SIGNIFICANT CHANGE UP
BUN SERPL-MCNC: 10 MG/DL — SIGNIFICANT CHANGE UP (ref 7–23)
BUN SERPL-MCNC: 10 MG/DL — SIGNIFICANT CHANGE UP (ref 7–23)
CALCIUM SERPL-MCNC: 8.5 MG/DL — SIGNIFICANT CHANGE UP (ref 8.4–10.5)
CALCIUM SERPL-MCNC: 8.6 MG/DL — SIGNIFICANT CHANGE UP (ref 8.4–10.5)
CHLORIDE SERPL-SCNC: 102 MMOL/L — SIGNIFICANT CHANGE UP (ref 96–108)
CHLORIDE SERPL-SCNC: 105 MMOL/L — SIGNIFICANT CHANGE UP (ref 96–108)
CO2 SERPL-SCNC: 23 MMOL/L — SIGNIFICANT CHANGE UP (ref 22–31)
CO2 SERPL-SCNC: 24 MMOL/L — SIGNIFICANT CHANGE UP (ref 22–31)
CREAT SERPL-MCNC: 0.46 MG/DL — LOW (ref 0.5–1.3)
CREAT SERPL-MCNC: 0.5 MG/DL — SIGNIFICANT CHANGE UP (ref 0.5–1.3)
GLUCOSE SERPL-MCNC: 100 MG/DL — HIGH (ref 70–99)
GLUCOSE SERPL-MCNC: 140 MG/DL — HIGH (ref 70–99)
HCT VFR BLD CALC: 34.8 % — SIGNIFICANT CHANGE UP (ref 34.5–45)
HCT VFR BLD CALC: 38.4 % — SIGNIFICANT CHANGE UP (ref 34.5–45)
HGB BLD-MCNC: 12.1 G/DL — SIGNIFICANT CHANGE UP (ref 11.5–15.5)
HGB BLD-MCNC: 13 G/DL — SIGNIFICANT CHANGE UP (ref 11.5–15.5)
INR BLD: 1.15 RATIO — SIGNIFICANT CHANGE UP (ref 0.88–1.16)
MCHC RBC-ENTMCNC: 31.1 PG — SIGNIFICANT CHANGE UP (ref 27–34)
MCHC RBC-ENTMCNC: 31.8 PG — SIGNIFICANT CHANGE UP (ref 27–34)
MCHC RBC-ENTMCNC: 33.8 GM/DL — SIGNIFICANT CHANGE UP (ref 32–36)
MCHC RBC-ENTMCNC: 34.9 GM/DL — SIGNIFICANT CHANGE UP (ref 32–36)
MCV RBC AUTO: 91.1 FL — SIGNIFICANT CHANGE UP (ref 80–100)
MCV RBC AUTO: 92 FL — SIGNIFICANT CHANGE UP (ref 80–100)
PLATELET # BLD AUTO: 169 K/UL — SIGNIFICANT CHANGE UP (ref 150–400)
PLATELET # BLD AUTO: 185 K/UL — SIGNIFICANT CHANGE UP (ref 150–400)
POTASSIUM SERPL-MCNC: 3.8 MMOL/L — SIGNIFICANT CHANGE UP (ref 3.5–5.3)
POTASSIUM SERPL-MCNC: 4.2 MMOL/L — SIGNIFICANT CHANGE UP (ref 3.5–5.3)
POTASSIUM SERPL-SCNC: 3.8 MMOL/L — SIGNIFICANT CHANGE UP (ref 3.5–5.3)
POTASSIUM SERPL-SCNC: 4.2 MMOL/L — SIGNIFICANT CHANGE UP (ref 3.5–5.3)
PROTHROM AB SERPL-ACNC: 13.2 SEC — HIGH (ref 10–12.9)
RBC # BLD: 3.82 M/UL — SIGNIFICANT CHANGE UP (ref 3.8–5.2)
RBC # BLD: 4.18 M/UL — SIGNIFICANT CHANGE UP (ref 3.8–5.2)
RBC # FLD: 12 % — SIGNIFICANT CHANGE UP (ref 10.3–14.5)
RBC # FLD: 12.2 % — SIGNIFICANT CHANGE UP (ref 10.3–14.5)
RH IG SCN BLD-IMP: POSITIVE — SIGNIFICANT CHANGE UP
SODIUM SERPL-SCNC: 138 MMOL/L — SIGNIFICANT CHANGE UP (ref 135–145)
SODIUM SERPL-SCNC: 139 MMOL/L — SIGNIFICANT CHANGE UP (ref 135–145)
WBC # BLD: 11.6 K/UL — HIGH (ref 3.8–10.5)
WBC # BLD: 5.7 K/UL — SIGNIFICANT CHANGE UP (ref 3.8–10.5)
WBC # FLD AUTO: 11.6 K/UL — HIGH (ref 3.8–10.5)
WBC # FLD AUTO: 5.7 K/UL — SIGNIFICANT CHANGE UP (ref 3.8–10.5)

## 2019-02-02 PROCEDURE — 73552 X-RAY EXAM OF FEMUR 2/>: CPT | Mod: 26,LT

## 2019-02-02 PROCEDURE — 73502 X-RAY EXAM HIP UNI 2-3 VIEWS: CPT | Mod: 26,LT

## 2019-02-02 RX ORDER — ACETAMINOPHEN 500 MG
750 TABLET ORAL ONCE
Qty: 0 | Refills: 0 | Status: COMPLETED | OUTPATIENT
Start: 2019-02-02 | End: 2019-02-02

## 2019-02-02 RX ORDER — ACETAMINOPHEN 500 MG
650 TABLET ORAL EVERY 6 HOURS
Qty: 0 | Refills: 0 | Status: DISCONTINUED | OUTPATIENT
Start: 2019-02-02 | End: 2019-02-02

## 2019-02-02 RX ORDER — TRAMADOL HYDROCHLORIDE 50 MG/1
50 TABLET ORAL EVERY 6 HOURS
Qty: 0 | Refills: 0 | Status: DISCONTINUED | OUTPATIENT
Start: 2019-02-02 | End: 2019-02-02

## 2019-02-02 RX ORDER — OXYCODONE HYDROCHLORIDE 5 MG/1
2.5 TABLET ORAL EVERY 4 HOURS
Qty: 0 | Refills: 0 | Status: DISCONTINUED | OUTPATIENT
Start: 2019-02-02 | End: 2019-02-07

## 2019-02-02 RX ORDER — ASPIRIN/CALCIUM CARB/MAGNESIUM 324 MG
325 TABLET ORAL
Qty: 0 | Refills: 0 | Status: DISCONTINUED | OUTPATIENT
Start: 2019-02-02 | End: 2019-02-07

## 2019-02-02 RX ORDER — HYDROMORPHONE HYDROCHLORIDE 2 MG/ML
0.25 INJECTION INTRAMUSCULAR; INTRAVENOUS; SUBCUTANEOUS EVERY 4 HOURS
Qty: 0 | Refills: 0 | Status: DISCONTINUED | OUTPATIENT
Start: 2019-02-02 | End: 2019-02-07

## 2019-02-02 RX ORDER — SODIUM CHLORIDE 9 MG/ML
500 INJECTION INTRAMUSCULAR; INTRAVENOUS; SUBCUTANEOUS ONCE
Qty: 0 | Refills: 0 | Status: COMPLETED | OUTPATIENT
Start: 2019-02-03 | End: 2019-02-03

## 2019-02-02 RX ORDER — ACETAMINOPHEN 500 MG
750 TABLET ORAL ONCE
Qty: 0 | Refills: 0 | Status: COMPLETED | OUTPATIENT
Start: 2019-02-02 | End: 2019-02-03

## 2019-02-02 RX ORDER — ACETAMINOPHEN 500 MG
975 TABLET ORAL EVERY 8 HOURS
Qty: 0 | Refills: 0 | Status: COMPLETED | OUTPATIENT
Start: 2019-02-03 | End: 2019-02-05

## 2019-02-02 RX ORDER — FAMOTIDINE 10 MG/ML
20 INJECTION INTRAVENOUS EVERY 12 HOURS
Qty: 0 | Refills: 0 | Status: DISCONTINUED | OUTPATIENT
Start: 2019-02-02 | End: 2019-02-02

## 2019-02-02 RX ORDER — DOCUSATE SODIUM 100 MG
100 CAPSULE ORAL THREE TIMES A DAY
Qty: 0 | Refills: 0 | Status: DISCONTINUED | OUTPATIENT
Start: 2019-02-02 | End: 2019-02-03

## 2019-02-02 RX ORDER — HYDROMORPHONE HYDROCHLORIDE 2 MG/ML
0.2 INJECTION INTRAMUSCULAR; INTRAVENOUS; SUBCUTANEOUS
Qty: 0 | Refills: 0 | Status: DISCONTINUED | OUTPATIENT
Start: 2019-02-02 | End: 2019-02-02

## 2019-02-02 RX ORDER — SODIUM CHLORIDE 9 MG/ML
1000 INJECTION, SOLUTION INTRAVENOUS
Qty: 0 | Refills: 0 | Status: DISCONTINUED | OUTPATIENT
Start: 2019-02-02 | End: 2019-02-07

## 2019-02-02 RX ORDER — DIPHENHYDRAMINE HCL 50 MG
25 CAPSULE ORAL EVERY 8 HOURS
Qty: 0 | Refills: 0 | Status: DISCONTINUED | OUTPATIENT
Start: 2019-02-02 | End: 2019-02-02

## 2019-02-02 RX ORDER — TRAMADOL HYDROCHLORIDE 50 MG/1
25 TABLET ORAL EVERY 4 HOURS
Qty: 0 | Refills: 0 | Status: DISCONTINUED | OUTPATIENT
Start: 2019-02-02 | End: 2019-02-07

## 2019-02-02 RX ORDER — POLYETHYLENE GLYCOL 3350 17 G/17G
17 POWDER, FOR SOLUTION ORAL AT BEDTIME
Qty: 0 | Refills: 0 | Status: DISCONTINUED | OUTPATIENT
Start: 2019-02-03 | End: 2019-02-07

## 2019-02-02 RX ORDER — ACETAMINOPHEN 500 MG
750 TABLET ORAL ONCE
Qty: 0 | Refills: 0 | Status: COMPLETED | OUTPATIENT
Start: 2019-02-03 | End: 2019-02-03

## 2019-02-02 RX ORDER — HALOPERIDOL DECANOATE 100 MG/ML
0.5 INJECTION INTRAMUSCULAR ONCE
Qty: 0 | Refills: 0 | Status: COMPLETED | OUTPATIENT
Start: 2019-02-02 | End: 2019-02-02

## 2019-02-02 RX ORDER — ONDANSETRON 8 MG/1
4 TABLET, FILM COATED ORAL EVERY 6 HOURS
Qty: 0 | Refills: 0 | Status: DISCONTINUED | OUTPATIENT
Start: 2019-02-02 | End: 2019-02-07

## 2019-02-02 RX ORDER — OXYCODONE HYDROCHLORIDE 5 MG/1
10 TABLET ORAL EVERY 4 HOURS
Qty: 0 | Refills: 0 | Status: DISCONTINUED | OUTPATIENT
Start: 2019-02-02 | End: 2019-02-02

## 2019-02-02 RX ORDER — ONDANSETRON 8 MG/1
4 TABLET, FILM COATED ORAL ONCE
Qty: 0 | Refills: 0 | Status: DISCONTINUED | OUTPATIENT
Start: 2019-02-02 | End: 2019-02-02

## 2019-02-02 RX ORDER — QUETIAPINE FUMARATE 200 MG/1
12.5 TABLET, FILM COATED ORAL ONCE
Qty: 0 | Refills: 0 | Status: COMPLETED | OUTPATIENT
Start: 2019-02-02 | End: 2019-02-02

## 2019-02-02 RX ORDER — LANOLIN ALCOHOL/MO/W.PET/CERES
3 CREAM (GRAM) TOPICAL AT BEDTIME
Qty: 0 | Refills: 0 | Status: DISCONTINUED | OUTPATIENT
Start: 2019-02-02 | End: 2019-02-07

## 2019-02-02 RX ORDER — HALOPERIDOL DECANOATE 100 MG/ML
0.5 INJECTION INTRAMUSCULAR EVERY 6 HOURS
Qty: 0 | Refills: 0 | Status: DISCONTINUED | OUTPATIENT
Start: 2019-02-02 | End: 2019-02-07

## 2019-02-02 RX ORDER — HALOPERIDOL DECANOATE 100 MG/ML
2 INJECTION INTRAMUSCULAR EVERY 6 HOURS
Qty: 0 | Refills: 0 | Status: DISCONTINUED | OUTPATIENT
Start: 2019-02-02 | End: 2019-02-07

## 2019-02-02 RX ORDER — OXYCODONE HYDROCHLORIDE 5 MG/1
5 TABLET ORAL EVERY 4 HOURS
Qty: 0 | Refills: 0 | Status: DISCONTINUED | OUTPATIENT
Start: 2019-02-02 | End: 2019-02-02

## 2019-02-02 RX ORDER — CEFAZOLIN SODIUM 1 G
2000 VIAL (EA) INJECTION EVERY 8 HOURS
Qty: 0 | Refills: 0 | Status: COMPLETED | OUTPATIENT
Start: 2019-02-02 | End: 2019-02-03

## 2019-02-02 RX ORDER — PANTOPRAZOLE SODIUM 20 MG/1
40 TABLET, DELAYED RELEASE ORAL
Qty: 0 | Refills: 0 | Status: DISCONTINUED | OUTPATIENT
Start: 2019-02-02 | End: 2019-02-07

## 2019-02-02 RX ADMIN — OXYCODONE HYDROCHLORIDE 5 MILLIGRAM(S): 5 TABLET ORAL at 10:26

## 2019-02-02 RX ADMIN — Medication 750 MILLIGRAM(S): at 22:10

## 2019-02-02 RX ADMIN — Medication 400 MILLIGRAM(S): at 00:00

## 2019-02-02 RX ADMIN — CHLORHEXIDINE GLUCONATE 1 APPLICATION(S): 213 SOLUTION TOPICAL at 06:39

## 2019-02-02 RX ADMIN — Medication 325 MILLIGRAM(S): at 17:56

## 2019-02-02 RX ADMIN — OXYCODONE HYDROCHLORIDE 5 MILLIGRAM(S): 5 TABLET ORAL at 10:48

## 2019-02-02 RX ADMIN — ONDANSETRON 4 MILLIGRAM(S): 8 TABLET, FILM COATED ORAL at 17:25

## 2019-02-02 RX ADMIN — Medication 100 MILLIGRAM(S): at 21:53

## 2019-02-02 RX ADMIN — Medication 300 MILLIGRAM(S): at 21:53

## 2019-02-02 RX ADMIN — HYDROMORPHONE HYDROCHLORIDE 0.2 MILLIGRAM(S): 2 INJECTION INTRAMUSCULAR; INTRAVENOUS; SUBCUTANEOUS at 15:50

## 2019-02-02 RX ADMIN — QUETIAPINE FUMARATE 12.5 MILLIGRAM(S): 200 TABLET, FILM COATED ORAL at 21:54

## 2019-02-02 RX ADMIN — SODIUM CHLORIDE 75 MILLILITER(S): 9 INJECTION, SOLUTION INTRAVENOUS at 16:07

## 2019-02-02 RX ADMIN — HALOPERIDOL DECANOATE 0.5 MILLIGRAM(S): 100 INJECTION INTRAMUSCULAR at 17:26

## 2019-02-02 RX ADMIN — HYDROMORPHONE HYDROCHLORIDE 0.2 MILLIGRAM(S): 2 INJECTION INTRAMUSCULAR; INTRAVENOUS; SUBCUTANEOUS at 15:35

## 2019-02-02 NOTE — PRE-ANESTHESIA EVALUATION ADULT - NSANTHPMHFT_GEN_ALL_CORE
alzheimer's;left shoulder rotator cuff repair, cataract extraction, spondylosis, lumbar compression fracture and lumbar radiculopathy, T and A, appendectomy, left knee arthroscopy; colonoscopy, skin cancer; macular degeneration, glaucoma, macular degeneration; elevated cholesterol, OA, carpal tunnel and trigger thumbs/fingers

## 2019-02-02 NOTE — PROGRESS NOTE ADULT - SUBJECTIVE AND OBJECTIVE BOX
Patient seen and examined. Pain controlled. No acute events overnight.  Denies any CP/SOB/Fever/Chills, pleasant this AM.       MEDICATIONS  (STANDING):  acetaminophen  IVPB .. 1000 milliGRAM(s) IV Intermittent once  chlorhexidine 4% Liquid 1 Application(s) Topical once  docusate sodium 100 milliGRAM(s) Oral three times a day  lactated ringers. 1000 milliLiter(s) IV Continuous <Continuous>  sodium chloride 0.9%. 1000 milliLiter(s) IV Continuous <Continuous>    Allergies    No Known Allergies    Intolerances                            13.1   6.8   )-----------( 195      ( 01 Feb 2019 18:42 )             38.0     01 Feb 2019 18:42    142    |  103    |  14     ----------------------------<  135    3.9     |  27     |  0.58     Ca    8.9        01 Feb 2019 18:42    TPro  6.1    /  Alb  3.7    /  TBili  0.4    /  DBili  x      /  AST  9      /  ALT  15     /  AlkPhos  74     01 Feb 2019 18:42    PT/INR - ( 01 Feb 2019 18:42 )   PT: 13.1 sec;   INR: 1.14 ratio         PTT - ( 01 Feb 2019 18:42 )  PTT:28.6 sec  Vital Signs Last 24 Hrs  T(C): 37.2 (02-02-19 @ 04:25), Max: 37.5 (02-01-19 @ 23:37)  T(F): 98.9 (02-02-19 @ 04:25), Max: 99.5 (02-01-19 @ 23:37)  HR: 78 (02-02-19 @ 04:25) (78 - 98)  BP: 112/7 (02-02-19 @ 04:25) (112/7 - 151/89)  RR: 18 (02-02-19 @ 04:25) (16 - 18)  SpO2: 97% (02-02-19 @ 04:25) (96% - 100%)    Physical Exam  Gen: NAD  LLE:   skin intact, no deformity of LLE, limited motion 2/2 pain  +ehl/fhl/ta/gs function  L2-S1 silt  Dp/pt pulse intact  No calf ttp  Compartments soft    LUE:  no TTP or pain with ROM shoulder, full passive and AROM of shoulder with no issues  +sensation C5-T1  +nerves anterior interosseus/posterior interosseus/radial/median/ulnar/musculocutaneous  +radial pulse        A/P: 83y Female with L IT Fx  Plan for OR this AM with Dr Lopez for IM Nail  NPO except meds  IV fluids while npo  Pain control  SCD's, hold all chemical dvt ppx   bed rest  FU AM Labs   Medical management appreciated- cleared for OR  Incentive spirometry

## 2019-02-02 NOTE — PRE-ANESTHESIA EVALUATION ADULT - NSANTHOSAYNRD_GEN_A_CORE
No. GAGE screening performed.  STOP BANG Legend: 0-2 = LOW Risk; 3-4 = INTERMEDIATE Risk; 5-8 = HIGH Risk

## 2019-02-02 NOTE — PROGRESS NOTE ADULT - ASSESSMENT
83y [facility] pain and inability to ambulate.  Patient denies headstrike or LOC. Pt aide witnessed the fall, she states pt tripped over her feet and landed onto her L hip. Localizes pain to Left hip/femur. Patient denies radiation of pain. Patient denies numbness/tingling/burning in the LLE. No other bone/joint complaints. Patient is a community ambulator at baseline without assistive devices. Patient has no issues w/ADL's 2/2 dementia. (01 Feb 2019 21:21  MEDICALLY STABLE TO PROCEED WITH SURGERY AS PLANNED.  OBTAIN LEFT SHOULDER XRAYS AS CHEST  FILM SUSPICIOUS FOR A LUCENCY SIGNIFYING  A POSSIBLE FRACTURE

## 2019-02-02 NOTE — PROGRESS NOTE ADULT - SUBJECTIVE AND OBJECTIVE BOX
Patient is a 83y old  Female who presents with a chief complaint of Left hip fracture (01 Feb 2019 21:21)      HPI:  83y Female [facility] severe left hip pain and inability to ambulate.  Patient denies headstrike or LOC. Pt aide witnessed the fall, she states pt tripped over her feet and landed onto her L hip. Localizes pain to Left hip/femur. Patient denies radiation of pain. Patient denies numbness/tingling/burning in the LLE. No other bone/joint complaints. Patient is a community ambulator at baseline without assistive devices. Patient has no issues w/ADL's 2/2 dementia.         MEDICATIONS  (STANDING):  acetaminophen  IVPB .. 1000 milliGRAM(s) IV Intermittent once  chlorhexidine 4% Liquid 1 Application(s) Topical once  docusate sodium 100 milliGRAM(s) Oral three times a day  lactated ringers. 1000 milliLiter(s) (100 mL/Hr) IV Continuous <Continuous>  sodium chloride 0.9%. 1000 milliLiter(s) (200 mL/Hr) IV Continuous <Continuous>    MEDICATIONS  (PRN):  acetaminophen   Tablet .. 650 milliGRAM(s) Oral every 6 hours PRN Temp greater or equal to 38C (100.4F)  acetaminophen   Tablet .. 650 milliGRAM(s) Oral every 6 hours PRN Mild Pain (1 - 3)  diphenhydrAMINE 25 milliGRAM(s) Oral every 8 hours PRN Itching  melatonin 3 milliGRAM(s) Oral at bedtime PRN Insomnia  ondansetron Injectable 4 milliGRAM(s) IV Push every 6 hours PRN Nausea and/or Vomiting  oxyCODONE    IR 2.5 milliGRAM(s) Oral every 4 hours PRN Moderate Pain (4 - 6)  oxyCODONE    IR 5 milliGRAM(s) Oral every 4 hours PRN Severe Pain (7 - 10)  traMADol 50 milliGRAM(s) Oral every 6 hours PRN Mild Pain (1 - 3)      Allergies    No Known Allergies    Intolerances      PAST MEDICAL HISTORY:  Alzheimer disease    PAST SURGICAL HISTORY:  H/O total hip arthroplasty, right  History of total knee arthroplasty, left  No significant past surgical history      Diet:  ( x  ) Regular   (   ) Low Sodium   (   ) Low Cholesterol   (   ) Diabetic   (   ) Other    FAMILY HISTORY:  No pertinent family history in first degree relatives      SOCIAL HISTORY:    Tobacco Usage:  (   ) never smoked   ( x  ) former smoker   (   ) current smoker  (   ) pack years  (   ) last cigarette date  Alcohol Usage: none   Advanced Directives: (   ) None    (   ) DNR    (   ) DNI    (   ) Health Care Proxy:     REVIEW OF SYSTEM:  CONSTITUTIONAL: No fever, No change in weight, No fatigue  HEAD: No headache, No dizziness, No recent trauma  EYES: No eye pain, No visual disturbances, No discharge  ENT:  No difficulty hearing, No tinnitus, No vertigo, No sinus pain, No throat pain  NECK: No pain, No stiffness  BREASTS: No pain, No masses, No nipple discharge  RESPIRATORY: No cough, No wheezing, No chills, No hemoptysis, No shortness of breath at rest or exertional shortness of breath  CARDIOVASCULAR: No chest pain, No palpitations, No dizziness, No CHF, No arrhythmia, No cardiomegaly, No leg swelling  GASTROINTESTINAL: No abdominal, No epigastric pain. No nausea, No vomiting, No hematemesis, No diarrhea, No constipation. No melena, No hematochezia. No GERD  GENITOURINARY: No dysuria, No frequency, No hematuria, No incontinence, No nocturia, No hesitancy,  SKIN: No itching, No burning, No rashes, No lesions   LYMPH NODES: No history of enlarged glands  ENDOCRINE: No heat or cold intolerance, No hair loss. No osteoporosis, No thyroid disease  MUSCULOSKELETAL: No joint pain or swelling, No muscle, back, or extremity pain  (+) LEFT HIP PAIN  PSYCHIATRIC: No depression, No anxiety, No mood swings, No difficulty sleeping  HEME/LYMPH: No easy bruising, No anticoagulants, No bleeding disorder, No bleeding gums  ALLERGY AND IMMUNOLOGIC: No hives, No eczema  NEUROLOGICAL: No memory loss, No loss of strength, No numbness, No tremors    VITALS:    T(C): 36.4 (02-02-19 @ 15:00), Max: 37.5 (02-01-19 @ 23:37)  HR: 88 (02-02-19 @ 15:45) (78 - 98)  BP: 140/88 (02-02-19 @ 15:45) (112/7 - 151/89)  RR: 15 (02-02-19 @ 15:45) (14 - 18)  SpO2: 95% (02-02-19 @ 15:45) (94% - 100%)    PHYSICAL EXAM:  GENERAL: NAD, well nourished (+) DEMENTIA  HEAD:  Atraumatic  EYES: EOM, PERRLA, conjunctiva pink and sclera white  ENT: No tonsillar erythema, exudates, or enlargement, moist mucous membranes, good dentition, no lesions  NECK: Supple, No JVD, normal thyroid, carotids with normal upstrokes and no bruits  CHEST/LUNG: Clear to auscultation bilaterally, No rales, rhonchi, wheezing, or rubs  HEART: Regular rate and rhythm, No murmurs, rubs, or gallops  ABDOMEN: Soft, nondistended, no masses, guarding, tenderness or rebound, bowel sounds present  EXTREMITIES:  2+ Peripheral Pulses, No clubbing, cyanosis, or edema. LEFT HIP FRACTURE  LYMPH: No lymphadenopathy noted  SKIN: No rashes or lesions  NERVOUS SYSTEM:  Alert & Oriented X3, normal cognitive function. Motor Strength 5/5 right upper and right lower.  5/5 left upper and left lower extremities, DTRs 2+ intact and symmetric    LABS:  ekg    NSR NO ACUTE CHANGES    CXR;  CLEAR LUNGS  LUCENCINY LEFT HUMERAL HEAD R/O FRACTURE                 13.0   11.6  )-----------( 185      ( 02 Feb 2019 15:43 )             38.4     02-02    138  |  102  |  10  ----------------------------<  140<H>  4.2   |  23  |  0.50    Ca    8.5      02 Feb 2019 15:43    TPro  6.1  /  Alb  3.7  /  TBili  0.4  /  DBili  x   /  AST  9<L>  /  ALT  15  /  AlkPhos  74  02-01    PT/INR - ( 02 Feb 2019 06:44 )   PT: 13.2 sec;   INR: 1.15 ratio         PTT - ( 02 Feb 2019 06:44 )  PTT:34.4 sec        RADIOLOGY & ADDITIONAL TESTS:    Consultant(s):    Care Discussed with Consultants/Other Providers [ ] YES  [ ] NO

## 2019-02-02 NOTE — BRIEF OPERATIVE NOTE - PROCEDURE
<<-----Click on this checkbox to enter Procedure Intramedullary nailing for fracture of femur  02/02/2019    Active  AVJOB

## 2019-02-02 NOTE — CHART NOTE - NSCHARTNOTEFT_GEN_A_CORE
Pt seen in RR: agitated, cursing, combative  Pulling @ her dressings    T(C): 36.4 (02-02-19 @ 15:00), Max: 37.5 (02-01-19 @ 23:37)  HR: 88 (02-02-19 @ 15:45) (78 - 98)  BP: 140/88 (02-02-19 @ 15:45) (112/7 - 151/89)  RR: 15 (02-02-19 @ 15:45) (14 - 18)  SpO2: 95% (02-02-19 @ 15:45) (94% - 100%)  Wt(kg): --    Exam:  awake, combative, not following commands  Card: +S1/S2, RRR  Pulm: CTAB  Abdomen soft / benign  Coleman  [ n]   EXT   LLE       Dressing (s) re-inforced       Calves soft       (+) moving all extremities        No Sensory Deficits noted        1+ pulses    LABS: PENDING      A/P: S/p Intramedullary nailing for fracture of L femur    - LOW dose narcs: tylenol IV/PO ultram / haldol prn  -PT:WBAT  -Chk  PACU & AM Labs  -DVT PPx: Ecotrin BID  -Pain Control PO/IV Pain Rx  -monitor closely  -Dispo planning: TBD      ***See Above  Khoi SMITH  Orthopedics  B: [phone]  S: [phone]

## 2019-02-03 ENCOUNTER — TRANSCRIPTION ENCOUNTER (OUTPATIENT)
Age: 84
End: 2019-02-03

## 2019-02-03 LAB
ANION GAP SERPL CALC-SCNC: 10 MMOL/L — SIGNIFICANT CHANGE UP (ref 5–17)
BUN SERPL-MCNC: 11 MG/DL — SIGNIFICANT CHANGE UP (ref 7–23)
CALCIUM SERPL-MCNC: 8.5 MG/DL — SIGNIFICANT CHANGE UP (ref 8.4–10.5)
CHLORIDE SERPL-SCNC: 102 MMOL/L — SIGNIFICANT CHANGE UP (ref 96–108)
CO2 SERPL-SCNC: 26 MMOL/L — SIGNIFICANT CHANGE UP (ref 22–31)
CREAT SERPL-MCNC: 0.5 MG/DL — SIGNIFICANT CHANGE UP (ref 0.5–1.3)
GLUCOSE SERPL-MCNC: 117 MG/DL — HIGH (ref 70–99)
HCT VFR BLD CALC: 34 % — LOW (ref 34.5–45)
HGB BLD-MCNC: 10.9 G/DL — LOW (ref 11.5–15.5)
MCHC RBC-ENTMCNC: 29.4 PG — SIGNIFICANT CHANGE UP (ref 27–34)
MCHC RBC-ENTMCNC: 32.1 GM/DL — SIGNIFICANT CHANGE UP (ref 32–36)
MCV RBC AUTO: 91.6 FL — SIGNIFICANT CHANGE UP (ref 80–100)
PLATELET # BLD AUTO: 164 K/UL — SIGNIFICANT CHANGE UP (ref 150–400)
POTASSIUM SERPL-MCNC: 4.1 MMOL/L — SIGNIFICANT CHANGE UP (ref 3.5–5.3)
POTASSIUM SERPL-SCNC: 4.1 MMOL/L — SIGNIFICANT CHANGE UP (ref 3.5–5.3)
RBC # BLD: 3.71 M/UL — LOW (ref 3.8–5.2)
RBC # FLD: 13.4 % — SIGNIFICANT CHANGE UP (ref 10.3–14.5)
SODIUM SERPL-SCNC: 138 MMOL/L — SIGNIFICANT CHANGE UP (ref 135–145)
WBC # BLD: 7.68 K/UL — SIGNIFICANT CHANGE UP (ref 3.8–10.5)
WBC # FLD AUTO: 7.68 K/UL — SIGNIFICANT CHANGE UP (ref 3.8–10.5)

## 2019-02-03 RX ORDER — DONEPEZIL HYDROCHLORIDE 10 MG/1
5 TABLET, FILM COATED ORAL ONCE
Qty: 0 | Refills: 0 | Status: COMPLETED | OUTPATIENT
Start: 2019-02-03 | End: 2019-02-03

## 2019-02-03 RX ORDER — DONEPEZIL HYDROCHLORIDE 10 MG/1
5 TABLET, FILM COATED ORAL DAILY
Qty: 0 | Refills: 0 | Status: DISCONTINUED | OUTPATIENT
Start: 2019-02-04 | End: 2019-02-07

## 2019-02-03 RX ADMIN — Medication 300 MILLIGRAM(S): at 06:00

## 2019-02-03 RX ADMIN — Medication 325 MILLIGRAM(S): at 06:00

## 2019-02-03 RX ADMIN — Medication 100 MILLIGRAM(S): at 06:00

## 2019-02-03 RX ADMIN — Medication 750 MILLIGRAM(S): at 14:45

## 2019-02-03 RX ADMIN — Medication 0.5 MILLIGRAM(S): at 22:31

## 2019-02-03 RX ADMIN — PANTOPRAZOLE SODIUM 40 MILLIGRAM(S): 20 TABLET, DELAYED RELEASE ORAL at 06:04

## 2019-02-03 RX ADMIN — SODIUM CHLORIDE 1000 MILLILITER(S): 9 INJECTION INTRAMUSCULAR; INTRAVENOUS; SUBCUTANEOUS at 08:23

## 2019-02-03 RX ADMIN — DONEPEZIL HYDROCHLORIDE 5 MILLIGRAM(S): 10 TABLET, FILM COATED ORAL at 10:24

## 2019-02-03 RX ADMIN — Medication 1 MILLIGRAM(S): at 23:55

## 2019-02-03 RX ADMIN — Medication 1 TABLET(S): at 11:38

## 2019-02-03 RX ADMIN — HALOPERIDOL DECANOATE 0.5 MILLIGRAM(S): 100 INJECTION INTRAMUSCULAR at 21:00

## 2019-02-03 RX ADMIN — Medication 300 MILLIGRAM(S): at 14:40

## 2019-02-03 RX ADMIN — Medication 325 MILLIGRAM(S): at 17:29

## 2019-02-03 NOTE — DISCHARGE NOTE ADULT - CARE PLAN
Principal Discharge DX:	Closed fracture of left hip, initial encounter  Goal:	Pain control. Return to normal activities.  Assessment and plan of treatment:	Follow up with Dr. Clay upon discharge from Rehab.  Activity: Weight Bearing as tolerated on Left Lower Extremity.  Dressing: Keep clean and dry. Staples and dressing to be removed on  postoperative day #14.  DVT Prophylaxis: Aspirin 325mg oral twice daily for six weeks to prevent blood clots. Principal Discharge DX:	Closed fracture of left hip, initial encounter  Goal:	Pain control. Return to normal activities.  Assessment and plan of treatment:	Follow up with Dr. Clay upon discharge from Rehab.  Activity: Weight Bearing as tolerated on Left Lower Extremity.  Dressing: Keep clean and dry. Staples and dressing to be removed on  postoperative day #14.  DVT Prophylaxis: Aspirin 325mg oral twice daily for six weeks to prevent blood clots.  Being discharged with Coleman catheter for failed Trial of Void.  Rehab may perform Trial of void in a few days. Principal Discharge DX:	Closed fracture of left hip, initial encounter  Goal:	Pain control. Return to normal activities.  Assessment and plan of treatment:	Follow up with Dr. Clay upon discharge from Rehab.  Activity: Weight Bearing as tolerated on Left Lower Extremity.  Dressing: Keep clean and dry. Staples and dressing to be removed on  postoperative day #14 (2/16/19).  DVT Prophylaxis: Aspirin 325mg oral twice daily for six weeks to prevent blood clots.  Being discharged with Coleman catheter for failed Trial of Void.  Rehab may perform Trial of void in a few days.

## 2019-02-03 NOTE — DISCHARGE NOTE ADULT - HOSPITAL COURSE
History of Present Illness:  Reason for Admission: Left hip fracture	  History of Present Illness: 	  83y Female presents to [facility] ED s/p mech fall c/o severe left hip pain and inability to ambulate.  Patient denies headstrike or LOC. Pt aide witnessed the fall, she states pt tripped over her feet and landed onto her L hip. Localizes pain to Left hip/femur. Patient denies radiation of pain. Patient denies numbness/tingling/burning in the LLE. No other bone/joint complaints. Patient is a community ambulator at baseline without assistive devices. Patient has no issues w/ADL's 2/2 dementia.          Review of Systems:  Other Review of Systems: All other review of systems negative, except as noted in HPI	      Allergies and Intolerances:        Allergies:  	No Known Allergies:     Home Medications:   * Outpatient Medication Status not yet specified  .    Patient History:    Past Medical History:  Alzheimer disease.     Past Surgical History:  H/O total hip arthroplasty, right    History of total knee arthroplasty, left.    Hospital Course:  Patient admitted to [facility] s/p mechanical fall. In the emergency room found to have a left intertrochanteric fracture in need of surgical fixation. Medicine Team consulted and cleared for surgery. Underwent Left Femur Intramedullary Nailing without complications. Evaluated and treated by Physical Therapy whom recommended History of Present Illness:  Reason for Admission: Left hip fracture	  History of Present Illness: 	  83y Female presents to [facility] ED s/p mech fall c/o severe left hip pain and inability to ambulate.  Patient denies headstrike or LOC. Pt aide witnessed the fall, she states pt tripped over her feet and landed onto her L hip. Localizes pain to Left hip/femur. Patient denies radiation of pain. Patient denies numbness/tingling/burning in the LLE. No other bone/joint complaints. Patient is a community ambulator at baseline without assistive devices. Patient has no issues w/ADL's 2/2 dementia.          Review of Systems:  Other Review of Systems: All other review of systems negative, except as noted in HPI	      Allergies and Intolerances:        Allergies:  	No Known Allergies:     Home Medications:   * Outpatient Medication Status not yet specified  .    Patient History:    Past Medical History:  Alzheimer disease.     Past Surgical History:  H/O total hip arthroplasty, right    History of total knee arthroplasty, left.    Hospital Course:  Patient admitted to [facility] s/p mechanical fall. In the emergency room found to have a left intertrochanteric fracture in need of surgical fixation. Medicine Team consulted and cleared for surgery. Underwent Left Femur Intramedullary Nailing without complications. Evaluated and treated by Physical Therapy who recommended subacute rehab.  Remain of hospital stay unremarkable and patient discharged home. History of Present Illness:  Reason for Admission: Left hip fracture	  History of Present Illness: 	  83y Female presents to [facility] ED s/p mech fall c/o severe left hip pain and inability to ambulate.  Patient denies headstrike or LOC. Pt aide witnessed the fall, she states pt tripped over her feet and landed onto her L hip. Localizes pain to Left hip/femur. Patient denies radiation of pain. Patient denies numbness/tingling/burning in the LLE. No other bone/joint complaints. Patient is a community ambulator at baseline without assistive devices. Patient has no issues w/ADL's 2/2 dementia.          Review of Systems:  Other Review of Systems: All other review of systems negative, except as noted in HPI	      Allergies and Intolerances:        Allergies:  	No Known Allergies:     Home Medications:   * Outpatient Medication Status not yet specified  .    Patient History:    Past Medical History:  Alzheimer disease.     Past Surgical History:  H/O total hip arthroplasty, right    History of total knee arthroplasty, left.    Hospital Course:  Patient admitted to [facility] s/p mechanical fall. In the emergency room found to have a left intertrochanteric fracture in need of surgical fixation. Medicine Team consulted and cleared for surgery. Underwent Left Femur Intramedullary Nailing without complications. Evaluated and treated by Physical Therapy who recommended subacute rehab.  Will transfer to rehab when bed available.

## 2019-02-03 NOTE — PROVIDER CONTACT NOTE (OTHER) - ASSESSMENT
pt refusing VS at this time, pt confused at baseline, pt hysterical and upset being alone in room, brought to nursing station in recliner chair, pt more calm and then became extremely upset, attempting to get out of chair, screaming at staff, fully dressed herself, sitting at edge of recliner- risk for harm. pt very adamant she needs to go home, shes going to call police etc

## 2019-02-03 NOTE — PHYSICAL THERAPY INITIAL EVALUATION ADULT - PERTINENT HX OF CURRENT PROBLEM, REHAB EVAL
83y F [facility] hip pain and inability to ambulate. Pt aide witnessed the fall, she states pt tripped over her feet and landed onto her L hip. Localizes pain to Left hip/femur. Pt is a community ambulator at baseline without assistive devices. Patient has no issues w/ADL's 2/2 dementia. Pt with +L hip fx, presents s/p L IMN on 2/2/19.

## 2019-02-03 NOTE — DISCHARGE NOTE ADULT - NS AS ACTIVITY OBS
No Heavy lifting/straining/Walking-Outdoors allowed/Stairs allowed/Walking-Indoors allowed/No direct water to dressing/incision sites./Do not make important decisions/Do not drive or operate machinery

## 2019-02-03 NOTE — PHYSICAL THERAPY INITIAL EVALUATION ADULT - GAIT DEVIATIONS NOTED, PT EVAL
decreased step length/decreased stride length/decreased swing-to-stance ratio/decreased edgard/decreased velocity of limb motion/increased time in double stance/decreased weight-shifting ability/narrow JAMAL, L knee bucking

## 2019-02-03 NOTE — PHYSICAL THERAPY INITIAL EVALUATION ADULT - PLANNED THERAPY INTERVENTIONS, PT EVAL
balance training/bed mobility training/strengthening/Stair training... GOAL: In 4 weeks pt will negotiate 5 stairs independently with least restrictive device./transfer training/gait training

## 2019-02-03 NOTE — DISCHARGE NOTE ADULT - CARE PROVIDER_API CALL
Cherelle Clay (MD)  Orthopaedic Surgery  [address] [place] [address], [address]  [place] [address]  Phone: [phone]  Fax: [phone]

## 2019-02-03 NOTE — PHYSICAL THERAPY INITIAL EVALUATION ADULT - IMPAIRMENTS FOUND, PT EVAL
cognitive impairment/muscle strength/arousal, attention, and cognition/poor safety awareness/ergonomics and body mechanics/gait, locomotion, and balance

## 2019-02-03 NOTE — DISCHARGE NOTE ADULT - PLAN OF CARE
Pain control. Return to normal activities. Follow up with Dr. Clay upon discharge from Rehab.  Activity: Weight Bearing as tolerated on Left Lower Extremity.  Dressing: Keep clean and dry. Staples and dressing to be removed on  postoperative day #14.  DVT Prophylaxis: Aspirin 325mg oral twice daily for six weeks to prevent blood clots. Follow up with Dr. Clay upon discharge from Rehab.  Activity: Weight Bearing as tolerated on Left Lower Extremity.  Dressing: Keep clean and dry. Staples and dressing to be removed on  postoperative day #14.  DVT Prophylaxis: Aspirin 325mg oral twice daily for six weeks to prevent blood clots.  Being discharged with Coleman catheter for failed Trial of Void.  Rehab may perform Trial of void in a few days. Follow up with Dr. Clay upon discharge from Rehab.  Activity: Weight Bearing as tolerated on Left Lower Extremity.  Dressing: Keep clean and dry. Staples and dressing to be removed on  postoperative day #14 (2/16/19).  DVT Prophylaxis: Aspirin 325mg oral twice daily for six weeks to prevent blood clots.  Being discharged with Coleman catheter for failed Trial of Void.  Rehab may perform Trial of void in a few days.

## 2019-02-03 NOTE — DISCHARGE NOTE ADULT - MEDICATION SUMMARY - MEDICATIONS TO TAKE
I will START or STAY ON the medications listed below when I get home from the hospital:    oxyCODONE  -- 2.5 milligram(s) by mouth every 4 hours, As Needed severe pain  -- Indication: For pain    traMADol 50 mg oral tablet  -- 0.5 tab(s) by mouth every 4 hours, As needed, Moderate Pain (4 - 6)  -- Indication: For pain    aspirin 325 mg oral delayed release tablet  -- 1 tab(s) by mouth 2 times a day x 6 weeks total for dvt prevention  -- Indication: For dvt prevention    Haldol 1 mg oral tablet  -- 1 milligram(s) by mouth every 6 hours, As Needed agitation  -- Indication: For Agiatation    donepezil 5 mg oral tablet  -- 1 tab(s) by mouth once a day  -- Indication: For Alzheimer disease    polyethylene glycol 3350 oral powder for reconstitution  -- 17 gram(s) by mouth once a day (at bedtime)  -- Indication: For Laxative    melatonin 3 mg oral tablet  -- 1 tab(s) by mouth once a day (at bedtime), As needed, Insomnia  -- Indication: For sleep    Multiple Vitamins oral tablet  -- 1 tab(s) by mouth once a day  -- Indication: For supplement

## 2019-02-03 NOTE — PROGRESS NOTE ADULT - ASSESSMENT
83y Female [facility] and inability to ambulate.  Patient denies headstrike or LOC. Pt aide witnessed the fall, she states pt tripped over her feet and landed onto her L hip. Localizes pain to Left hip/femur. Patient denies radiation of pain. Patient denies numbness/tingling/burning in the LLE. No other bone/joint complaints. Patient is a community ambulator at baseline without assistive devices. Patient has no issues w/ADL's 2/2 dementia. (01 Feb 2019 21:21  MEDICALLY STABLE TO PROCEED WITH SURGERY AS PLANNED.  OBTAIN LEFT SHOULDER XRAYS AS CHEST  FILM SUSPICIOUS FOR A LUCENCY SIGNIFYING  A POSSIBLE FRACTURE

## 2019-02-03 NOTE — DISCHARGE NOTE ADULT - PATIENT PORTAL LINK FT
You can access the [facility] Patient Portal, offered by [facility], by registering with the following website: [email or web address]

## 2019-02-03 NOTE — PHYSICAL THERAPY INITIAL EVALUATION ADULT - GENERAL OBSERVATIONS, REHAB EVAL
Pt tolerated 45min PT initial evaluation well. Pt POD2 s/p L IMN. Pt rec'd in chair in NAD, +devi. Pt A&O x1, pleasantly confused, reoriented multiple times throughout session.

## 2019-02-03 NOTE — PHYSICAL THERAPY INITIAL EVALUATION ADULT - ADDITIONAL COMMENTS
Pt poor historian, pt family reports pt lives alone in a private home with "a few" steps to enter. Pt was independent with mobility with no AD and required assist for IADLs. Pt has HHA 9 hrs/day 5 days a week, family assists on weekends.

## 2019-02-03 NOTE — PROGRESS NOTE ADULT - SUBJECTIVE AND OBJECTIVE BOX
Patient is a 83y old  Female who presents with a chief complaint of Left hip fracture (01 Feb 2019 21:21)      HPI:  83y Female [facility]/o severe left hip pain and inability to ambulate.  Patient denies headstrike or LOC. Pt aide witnessed the fall, she states pt tripped over her feet and landed onto her L hip. Localizes pain to Left hip/femur. Patient denies radiation of pain. Patient denies numbness/tingling/burning in the LLE. No other bone/joint complaints. Patient is a community ambulator at baseline without assistive devices. Patient has no issues w/ADL's 2/2 dementia.         MEDICATIONS  (STANDING):  acetaminophen  IVPB .. 1000 milliGRAM(s) IV Intermittent once  chlorhexidine 4% Liquid 1 Application(s) Topical once  docusate sodium 100 milliGRAM(s) Oral three times a day  lactated ringers. 1000 milliLiter(s) (100 mL/Hr) IV Continuous <Continuous>  sodium chloride 0.9%. 1000 milliLiter(s) (200 mL/Hr) IV Continuous <Continuous>    MEDICATIONS  (PRN):  acetaminophen   Tablet .. 650 milliGRAM(s) Oral every 6 hours PRN Temp greater or equal to 38C (100.4F)  acetaminophen   Tablet .. 650 milliGRAM(s) Oral every 6 hours PRN Mild Pain (1 - 3)  diphenhydrAMINE 25 milliGRAM(s) Oral every 8 hours PRN Itching  melatonin 3 milliGRAM(s) Oral at bedtime PRN Insomnia  ondansetron Injectable 4 milliGRAM(s) IV Push every 6 hours PRN Nausea and/or Vomiting  oxyCODONE    IR 2.5 milliGRAM(s) Oral every 4 hours PRN Moderate Pain (4 - 6)  oxyCODONE    IR 5 milliGRAM(s) Oral every 4 hours PRN Severe Pain (7 - 10)  traMADol 50 milliGRAM(s) Oral every 6 hours PRN Mild Pain (1 - 3)      Allergies    No Known Allergies    Intolerances      PAST MEDICAL HISTORY:  Alzheimer disease    PAST SURGICAL HISTORY:  H/O total hip arthroplasty, right  History of total knee arthroplasty, left  No significant past surgical history      Diet:  ( x  ) Regular   (   ) Low Sodium   (   ) Low Cholesterol   (   ) Diabetic   (   ) Other    FAMILY HISTORY:  No pertinent family history in first degree relatives      SOCIAL HISTORY:    Tobacco Usage:  (   ) never smoked   ( x  ) former smoker   (   ) current smoker  (   ) pack years  (   ) last cigarette date  Alcohol Usage: none   Advanced Directives: (   ) None    (   ) DNR    (   ) DNI    (   ) Health Care Proxy:     REVIEW OF SYSTEM:  CONSTITUTIONAL: No fever, No change in weight, No fatigue  HEAD: No headache, No dizziness, No recent trauma  EYES: No eye pain, No visual disturbances, No discharge  ENT:  No difficulty hearing, No tinnitus, No vertigo, No sinus pain, No throat pain  NECK: No pain, No stiffness  BREASTS: No pain, No masses, No nipple discharge  RESPIRATORY: No cough, No wheezing, No chills, No hemoptysis, No shortness of breath at rest or exertional shortness of breath  CARDIOVASCULAR: No chest pain, No palpitations, No dizziness, No CHF, No arrhythmia, No cardiomegaly, No leg swelling  GASTROINTESTINAL: No abdominal, No epigastric pain. No nausea, No vomiting, No hematemesis, No diarrhea, No constipation. No melena, No hematochezia. No GERD  GENITOURINARY: No dysuria, No frequency, No hematuria, No incontinence, No nocturia, No hesitancy,  SKIN: No itching, No burning, No rashes, No lesions   LYMPH NODES: No history of enlarged glands  ENDOCRINE: No heat or cold intolerance, No hair loss. No osteoporosis, No thyroid disease  MUSCULOSKELETAL: No joint pain or swelling, No muscle, back, or extremity pain  (+) LEFT HIP PAIN  PSYCHIATRIC: No depression, No anxiety, No mood swings, No difficulty sleeping  HEME/LYMPH: No easy bruising, No anticoagulants, No bleeding disorder, No bleeding gums  ALLERGY AND IMMUNOLOGIC: No hives, No eczema  NEUROLOGICAL: No memory loss, No loss of strength, No numbness, No tremors    VITALS:    T(C): 37 (02-03-19 @ 05:55), Max: 37 (02-03-19 @ 05:55)  HR: 95 (02-03-19 @ 05:55) (80 - 110)  BP: 131/78 (02-03-19 @ 05:55) (113/72 - 144/79)  RR: 18 (02-03-19 @ 05:55) (14 - 18)  SpO2: 97% (02-03-19 @ 05:55) (93% - 98%)        PHYSICAL EXAM:  GENERAL: NAD, well nourished (+) DEMENTIA  HEAD:  Atraumatic  EYES: EOM, PERRLA, conjunctiva pink and sclera white  ENT: No tonsillar erythema, exudates, or enlargement, moist mucous membranes, good dentition, no lesions  NECK: Supple, No JVD, normal thyroid, carotids with normal upstrokes and no bruits  CHEST/LUNG: Clear to auscultation bilaterally, No rales, rhonchi, wheezing, or rubs  HEART: Regular rate and rhythm, No murmurs, rubs, or gallops  ABDOMEN: Soft, nondistended, no masses, guarding, tenderness or rebound, bowel sounds present  EXTREMITIES:  2+ Peripheral Pulses, No clubbing, cyanosis, or edema. LEFT HIP FRACTURE=IM NAIL  LYMPH: No lymphadenopathy noted  SKIN: No rashes or lesions  NERVOUS SYSTEM:  Alert & Oriented X3, normal cognitive function. Motor Strength 5/5 right upper and right lower.  5/5 left upper and left lower extremities, DTRs 2+ intact and symmetric    LABS:  ekg    NSR NO ACUTE CHANGES    Labs:  CBC Full  -  ( 03 Feb 2019 09:09 )  WBC Count : 7.68 K/uL  Hemoglobin : 10.9 g/dL  Hematocrit : 34.0 %  Platelet Count - Automated : 164 K/uL  Mean Cell Volume : 91.6 fl  Mean Cell Hemoglobin : 29.4 pg  Mean Cell Hemoglobin Concentration : 32.1 gm/dL  Auto Neutrophil # : x  Auto Lymphocyte # : x  Auto Monocyte # : x  Auto Eosinophil # : x  Auto Basophil # : x  Auto Neutrophil % : x  Auto Lymphocyte % : x  Auto Monocyte % : x  Auto Eosinophil % : x  Auto Basophil % : x      02-03    138  |  102  |  11  ----------------------------<  117<H>  4.1   |  26  |  0.50      Consultant(s):    Care Discussed with Consultants/Other Providers [ ] YES  [ ] NO

## 2019-02-03 NOTE — PROGRESS NOTE ADULT - SUBJECTIVE AND OBJECTIVE BOX
Patient comfortable. No complaints. Denies cp, sob, palpitations.    T(C): 37 (02-03-19 @ 05:55), Max: 37 (02-03-19 @ 05:55)  HR: 95 (02-03-19 @ 05:55) (80 - 110)  BP: 131/78 (02-03-19 @ 05:55) (113/72 - 144/79)  RR: 18 (02-03-19 @ 05:55) (14 - 18)  SpO2: 97% (02-03-19 @ 05:55) (93% - 98%)      PHYSICAL EXAM:  NAD, Awake, confused, alert, follows commands  Left Hip: Dressings C/D/I; dull sensation grossly intact to light touch; (+) DF/PF; (+) Distal Pulses; No Calf tenderness B/L, PAS     LABS:                        13.0   11.6  )-----------( 185      ( 02 Feb 2019 15:43 )             38.4     02-02    138  |  102  |  10  ----------------------------<  140<H>  4.2   |  23  |  0.50    Ca    8.5      02 Feb 2019 15:43    TPro  6.1  /  Alb  3.7  /  TBili  0.4  /  DBili  x   /  AST  9<L>  /  ALT  15  /  AlkPhos  74  02-01    PT/INR - ( 02 Feb 2019 06:44 )   PT: 13.2 sec;   INR: 1.15 ratio         PTT - ( 02 Feb 2019 06:44 )  PTT:34.4 sec

## 2019-02-03 NOTE — PHYSICAL THERAPY INITIAL EVALUATION ADULT - DIAGNOSIS, PT EVAL
Pt presents with pain, decreased strength, decreased balance, decreased endurance, and decreased cognition limiting her mobility.

## 2019-02-03 NOTE — DISCHARGE NOTE ADULT - OTHER SIGNIFICANT FINDINGS
h/h:  10.9/34.0 h/h:  10.9/34.0    Devi inserted for retention on 2/3/19.  Please consider discontinuing devi once ambulating better.

## 2019-02-03 NOTE — PROVIDER CONTACT NOTE (OTHER) - ACTION/TREATMENT ORDERED:
Haldol given with no effect, pt to be given something else IV to relax patient, if doesn't work will need 1:1 for pt safety, lexa renteria aware of situation

## 2019-02-03 NOTE — PROGRESS NOTE ADULT - ASSESSMENT
A/P: 84 y/o M POD#1 S/P L IM Nail      DVT ppx- ASA 325mg PO BID  LLE WBAT  Pain management prn  Regular diet  PT  F/U AM Labs      SARAH Galindo  Orthopedic Surgery  [phone]

## 2019-02-04 PROCEDURE — 93010 ELECTROCARDIOGRAM REPORT: CPT

## 2019-02-04 RX ORDER — HALOPERIDOL DECANOATE 100 MG/ML
2 INJECTION INTRAMUSCULAR ONCE
Qty: 0 | Refills: 0 | Status: COMPLETED | OUTPATIENT
Start: 2019-02-04 | End: 2019-02-04

## 2019-02-04 RX ADMIN — HALOPERIDOL DECANOATE 2 MILLIGRAM(S): 100 INJECTION INTRAMUSCULAR at 06:30

## 2019-02-04 RX ADMIN — Medication 975 MILLIGRAM(S): at 13:49

## 2019-02-04 RX ADMIN — Medication 750 MILLIGRAM(S): at 01:00

## 2019-02-04 RX ADMIN — DONEPEZIL HYDROCHLORIDE 5 MILLIGRAM(S): 10 TABLET, FILM COATED ORAL at 13:20

## 2019-02-04 RX ADMIN — Medication 975 MILLIGRAM(S): at 13:19

## 2019-02-04 RX ADMIN — Medication 1 TABLET(S): at 13:20

## 2019-02-04 RX ADMIN — Medication 325 MILLIGRAM(S): at 23:16

## 2019-02-04 RX ADMIN — Medication 325 MILLIGRAM(S): at 13:20

## 2019-02-04 NOTE — PROGRESS NOTE ADULT - ASSESSMENT
83y [facility] left hip pain and inability to ambulate.  Patient denies headstrike or LOC. Pt aide witnessed the fall, she states pt tripped over her feet and landed onto her L hip. Localizes pain to Left hip/femur. Patient denies radiation of pain. Patient denies numbness/tingling/burning in the LLE. No other bone/joint complaints. Patient is a community ambulator at baseline without assistive devices. Patient has no issues w/ADL's 2/2 dementia. (01 Feb 2019 21:21  MEDICALLY STABLE TO PROCEED WITH SURGERY AS PLANNED.  OBTAIN LEFT SHOULDER XRAYS AS CHEST  FILM SUSPICIOUS FOR A LUCENCY SIGNIFYING  A POSSIBLE FRACTURE

## 2019-02-04 NOTE — PROVIDER CONTACT NOTE (OTHER) - REASON
pt with extreme agitation, screaming, cursing, aggressive towards staff, attempting to get out of chair

## 2019-02-04 NOTE — OCCUPATIONAL THERAPY INITIAL EVALUATION ADULT - DIAGNOSIS, OT EVAL
Patient with decreased ADL status and impairments with functional mobility due to Patient with decreased ADL status and impairments with functional mobility due to decreased balance, ROM, strength, and cognition

## 2019-02-04 NOTE — PROGRESS NOTE ADULT - SUBJECTIVE AND OBJECTIVE BOX
ORTHO PROGRESS NOTE     Pt seen and examined. confused overnight requiring constant observation. Currently calm. not complaining of any pain    Vital Signs Last 24 Hrs  T(C): 37.1 (04 Feb 2019 05:28), Max: 37.3 (03 Feb 2019 14:25)  T(F): 98.7 (04 Feb 2019 05:28), Max: 99.2 (03 Feb 2019 14:25)  HR: 155 (04 Feb 2019 05:28) (101 - 155)  BP: 162/89 (04 Feb 2019 05:28) (111/68 - 162/89)  BP(mean): --  RR: 18 (04 Feb 2019 05:28) (18 - 18)  SpO2: 100% (04 Feb 2019 05:28) (97% - 100%)    Gen: No apparent distress, alert  LE:  Dressing C/D/I;          +DF/PF. moving toes          SILT, wwp at foot          compartments soft    Labs:  CBC Full  -  ( 03 Feb 2019 09:09 )  WBC Count : 7.68 K/uL  Hemoglobin : 10.9 g/dL  Hematocrit : 34.0 %  Platelet Count - Automated : 164 K/uL  Mean Cell Volume : 91.6 fl  Mean Cell Hemoglobin : 29.4 pg  Mean Cell Hemoglobin Concentration : 32.1 gm/dL  Auto Neutrophil # : x  Auto Lymphocyte # : x  Auto Monocyte # : x  Auto Eosinophil # : x  Auto Basophil # : x  Auto Neutrophil % : x  Auto Lymphocyte % : x  Auto Monocyte % : x  Auto Eosinophil % : x  Auto Basophil % : x      02-03    138  |  102  |  11  ----------------------------<  117<H>  4.1   |  26  |  0.50    Ca    8.5      03 Feb 2019 08:05        A/P  Pt is a 83y old Female s/p L hip IMN POD 2    - Pain control/ Analgesia  - DVT ppx  - PT/OT - wbat/oob    - WBAT  - patient getting a dose of haldol for aggitation  - monitor BP  - dispo planning. likely rehab

## 2019-02-04 NOTE — PROVIDER CONTACT NOTE (OTHER) - ASSESSMENT
refusing VS, very angry and aggressive towards staff, hitting, screaming/cursing, attempts frequently to get out of chair, pt sitting in nursing station with multiple staff members around her all night, stating she needs to go home, needs to get her keys and leave, haldol and ativan given already with no effect

## 2019-02-04 NOTE — PROVIDER CONTACT NOTE (OTHER) - ACTION/TREATMENT ORDERED:
EKG done- sinus tachycardia. ortho team to be rounding soon and will eval what to give pt, haldol given last night with no effect

## 2019-02-04 NOTE — PROVIDER CONTACT NOTE (OTHER) - ACTION/TREATMENT ORDERED:
Yash renteria aware of situation and patients continued agitation and aggressive baehavior, IV ativan to be given again now

## 2019-02-04 NOTE — OCCUPATIONAL THERAPY INITIAL EVALUATION ADULT - PERTINENT HX OF CURRENT PROBLEM, REHAB EVAL
83y [facility] left hip pain and inability to ambulate.  Patient denies headstrike or LOC. Pt aide witnessed the fall, she states pt tripped over her feet and landed onto her L hip. Localizes pain to Left hip/femur. Patient denies radiation of pain. Patient denies numbness/tingling/burning in the LLE. Patient is a community ambulator at baseline without assistive devices. Patient has no issues w/ADL's 2/2 dementia.

## 2019-02-04 NOTE — PROVIDER CONTACT NOTE (OTHER) - ASSESSMENT
HR in 150s, pt woke up agitated, trying to get out of chair unassisted, states she needs to go home, pt remains confused

## 2019-02-04 NOTE — PROGRESS NOTE ADULT - SUBJECTIVE AND OBJECTIVE BOX
83y Female presents to [facility] ED s/p [facility] fall c/o severe left hip pain and inability to ambulate.  Patient denies headstrike or LOC. Pt aide witnessed the fall, she states pt tripped over her feet and landed onto her L hip. Localizes pain to Left hip/femur. Patient denies radiation of pain. Patient denies numbness/tingling/burning in the LLE. No other bone/joint complaints. Patient is a community ambulator at baseline without assistive devices. Patient has no issues w/ADL's 2/2 dementia. tolerated procedure well    MEDICATIONS  (STANDING):  acetaminophen   Tablet .. 975 milliGRAM(s) Oral every 8 hours  aspirin enteric coated 325 milliGRAM(s) Oral two times a day  donepezil 5 milliGRAM(s) Oral daily  lactated ringers. 1000 milliLiter(s) (75 mL/Hr) IV Continuous <Continuous>  multivitamin 1 Tablet(s) Oral daily  pantoprazole    Tablet 40 milliGRAM(s) Oral before breakfast  polyethylene glycol 3350 17 Gram(s) Oral at bedtime    MEDICATIONS  (PRN):  haloperidol     Tablet 2 milliGRAM(s) Oral every 6 hours PRN agitation  haloperidol    Injectable 0.5 milliGRAM(s) IV Push every 6 hours PRN severe agitation  HYDROmorphone  Injectable 0.25 milliGRAM(s) IV Push every 4 hours PRN breakthrough  melatonin 3 milliGRAM(s) Oral at bedtime PRN Insomnia  ondansetron Injectable 4 milliGRAM(s) IV Push every 6 hours PRN Nausea and/or Vomiting  oxyCODONE    IR 2.5 milliGRAM(s) Oral every 4 hours PRN Severe Pain (7 - 10)  traMADol 25 milliGRAM(s) Oral every 4 hours PRN Moderate Pain (4 - 6)          VITALS:   T(C): 36.9 (02-04-19 @ 23:25), Max: 37.2 (02-04-19 @ 16:07)  HR: 98 (02-04-19 @ 23:25) (97 - 155)  BP: 137/89 (02-04-19 @ 23:25) (101/62 - 162/89)  RR: 18 (02-04-19 @ 23:25) (18 - 18)  SpO2: 97% (02-04-19 @ 23:25) (93% - 100%)  Wt(kg): --    PHYSICAL EXAM:  GENERAL: NAD, well nourished (+) DEMENTIA  HEAD:  Atraumatic  EYES: EOM, PERRLA, conjunctiva pink and sclera white  ENT: No tonsillar erythema, exudates, or enlargement, moist mucous membranes, good dentition, no lesions  NECK: Supple, No JVD, normal thyroid, carotids with normal upstrokes and no bruits  CHEST/LUNG: Clear to auscultation bilaterally, No rales, rhonchi, wheezing, or rubs  HEART: Regular rate and rhythm, No murmurs, rubs, or gallops  ABDOMEN: Soft, nondistended, no masses, guarding, tenderness or rebound, bowel sounds present  EXTREMITIES:  2+ Peripheral Pulses, No clubbing, cyanosis, or edema. LEFT HIP FRACTURE=IM NAIL  LYMPH: No lymphadenopathy noted  SKIN: No rashes or lesions  NERVOUS SYSTEM:  Alert & Oriented X3, normal cognitive function. Motor Strength 5/5 right upper and right lower.  5/5 left upper and left lower extremities, DTRs 2+ intact and symmetric    LABS:        CBC Full  -  ( 03 Feb 2019 09:09 )  WBC Count : 7.68 K/uL  Hemoglobin : 10.9 g/dL  Hematocrit : 34.0 %  Platelet Count - Automated : 164 K/uL  Mean Cell Volume : 91.6 fl  Mean Cell Hemoglobin : 29.4 pg  Mean Cell Hemoglobin Concentration : 32.1 gm/dL  Auto Neutrophil # : x  Auto Lymphocyte # : x  Auto Monocyte # : x  Auto Eosinophil # : x  Auto Basophil # : x  Auto Neutrophil % : x  Auto Lymphocyte % : x  Auto Monocyte % : x  Auto Eosinophil % : x  Auto Basophil % : x    02-03    138  |  102  |  11  ----------------------------<  117<H>  4.1   |  26  |  0.50    Ca    8.5      03 Feb 2019 08:05            CAPILLARY BLOOD GLUCOSE          RADIOLOGY & ADDITIONAL TESTS:

## 2019-02-04 NOTE — OCCUPATIONAL THERAPY INITIAL EVALUATION ADULT - ADDITIONAL COMMENTS
xray pelvis 2/2- Patient is status post open reduction internal fixation of the left hip with a femoral neck compression screw and a long femoral intramedullary jerry with 2 distal interlocking screws. There is anatomic alignment of fracture fragments. Postsurgical changes include subcutaneous air, soft tissue swelling, and cutaneous surgical staples. No new fractures are demonstrated within the imaged pelvis, left hip, or left femur. There is no dislocation. Left hip joint space is preserved. Patient is again noted to be status post right total hip arthroplasty with a noncemented femoral component, acetabular augmentation screws, and a proximal femoral cerclage wire. Patient is status post left total knee arthroplasty. Heterotopic bone formation is noted superiorly. 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

## 2019-02-04 NOTE — OCCUPATIONAL THERAPY INITIAL EVALUATION ADULT - LIVES WITH, PROFILE
Pt lives in a pvt home alone, 3 steps to enter, no handrails. 1st floor setup. HHA 9 hrs/day M-F. Family assists on weekend. +Tub with curtain. Owns straight cane/alone

## 2019-02-05 RX ORDER — ACETAMINOPHEN 500 MG
975 TABLET ORAL EVERY 8 HOURS
Qty: 0 | Refills: 0 | Status: DISCONTINUED | OUTPATIENT
Start: 2019-02-05 | End: 2019-02-06

## 2019-02-05 RX ORDER — DONEPEZIL HYDROCHLORIDE 10 MG/1
1 TABLET, FILM COATED ORAL
Qty: 0 | Refills: 0 | DISCHARGE
Start: 2019-02-05

## 2019-02-05 RX ORDER — TRAMADOL HYDROCHLORIDE 50 MG/1
0.5 TABLET ORAL
Qty: 0 | Refills: 0 | DISCHARGE
Start: 2019-02-05

## 2019-02-05 RX ORDER — LANOLIN ALCOHOL/MO/W.PET/CERES
1 CREAM (GRAM) TOPICAL
Qty: 0 | Refills: 0 | DISCHARGE
Start: 2019-02-05

## 2019-02-05 RX ORDER — POLYETHYLENE GLYCOL 3350 17 G/17G
17 POWDER, FOR SOLUTION ORAL
Qty: 0 | Refills: 0 | DISCHARGE
Start: 2019-02-05

## 2019-02-05 RX ORDER — ACETAMINOPHEN 500 MG
975 TABLET ORAL EVERY 6 HOURS
Qty: 0 | Refills: 0 | Status: DISCONTINUED | OUTPATIENT
Start: 2019-02-05 | End: 2019-02-05

## 2019-02-05 RX ORDER — OXYCODONE HYDROCHLORIDE 5 MG/1
2.5 TABLET ORAL
Qty: 0 | Refills: 0 | DISCHARGE
Start: 2019-02-05

## 2019-02-05 RX ORDER — ASPIRIN/CALCIUM CARB/MAGNESIUM 324 MG
1 TABLET ORAL
Qty: 0 | Refills: 0 | DISCHARGE
Start: 2019-02-05

## 2019-02-05 RX ADMIN — Medication 1 TABLET(S): at 11:56

## 2019-02-05 RX ADMIN — Medication 325 MILLIGRAM(S): at 21:06

## 2019-02-05 RX ADMIN — Medication 325 MILLIGRAM(S): at 11:53

## 2019-02-05 RX ADMIN — DONEPEZIL HYDROCHLORIDE 5 MILLIGRAM(S): 10 TABLET, FILM COATED ORAL at 11:53

## 2019-02-05 NOTE — PROGRESS NOTE ADULT - SUBJECTIVE AND OBJECTIVE BOX
83y Female presents to [facility] ED s/p [facility] fall c/o severe left hip pain and inability to ambulate.  Patient denies headstrike or LOC. Pt aide witnessed the fall, she states pt tripped over her feet and landed onto her L hip. Localizes pain to Left hip/femur. Patient denies radiation of pain. Patient denies numbness/tingling/burning in the LLE. No other bone/joint complaints. Patient is a community ambulator at baseline without assistive devices. Patient has no issues w/ADL's 2/2 dementia. tolerated procedure well    MEDICATIONS  (STANDING):  acetaminophen   Tablet .. 975 milliGRAM(s) Oral every 8 hours  aspirin enteric coated 325 milliGRAM(s) Oral two times a day  donepezil 5 milliGRAM(s) Oral daily  lactated ringers. 1000 milliLiter(s) (75 mL/Hr) IV Continuous <Continuous>  multivitamin 1 Tablet(s) Oral daily  pantoprazole    Tablet 40 milliGRAM(s) Oral before breakfast  polyethylene glycol 3350 17 Gram(s) Oral at bedtime    MEDICATIONS  (PRN):  haloperidol     Tablet 2 milliGRAM(s) Oral every 6 hours PRN agitation  haloperidol    Injectable 0.5 milliGRAM(s) IV Push every 6 hours PRN severe agitation  HYDROmorphone  Injectable 0.25 milliGRAM(s) IV Push every 4 hours PRN breakthrough  melatonin 3 milliGRAM(s) Oral at bedtime PRN Insomnia  ondansetron Injectable 4 milliGRAM(s) IV Push every 6 hours PRN Nausea and/or Vomiting  oxyCODONE    IR 2.5 milliGRAM(s) Oral every 4 hours PRN Severe Pain (7 - 10)  traMADol 25 milliGRAM(s) Oral every 4 hours PRN Moderate Pain (4 - 6)          VITALS:   T(C): 36.9 (02-04-19 @ 23:25), Max: 37.2 (02-04-19 @ 16:07)  HR: 98 (02-04-19 @ 23:25) (97 - 155)  BP: 137/89 (02-04-19 @ 23:25) (101/62 - 162/89)  RR: 18 (02-04-19 @ 23:25) (18 - 18)  SpO2: 97% (02-04-19 @ 23:25) (93% - 100%)  Wt(kg): --    PHYSICAL EXAM:  GENERAL: NAD, well nourished (+) DEMENTIA  HEAD:  Atraumatic  EYES: EOM, PERRLA, conjunctiva pink and sclera white  ENT: No tonsillar erythema, exudates, or enlargement, moist mucous membranes, good dentition, no lesions  NECK: Supple, No JVD, normal thyroid, carotids with normal upstrokes and no bruits  CHEST/LUNG: Clear to auscultation bilaterally, No rales, rhonchi, wheezing, or rubs  HEART: Regular rate and rhythm, No murmurs, rubs, or gallops  ABDOMEN: Soft, nondistended, no masses, guarding, tenderness or rebound, bowel sounds present  EXTREMITIES:  2+ Peripheral Pulses, No clubbing, cyanosis, or edema. LEFT HIP FRACTURE=IM NAIL  LYMPH: No lymphadenopathy noted  SKIN: No rashes or lesions  NERVOUS SYSTEM:  Alert & Oriented X3, normal cognitive function. Motor Strength 5/5 right upper and right lower.  5/5 left upper and left lower extremities, DTRs 2+ intact and symmetric    LABS:        CBC Full  -  ( 03 Feb 2019 09:09 )  WBC Count : 7.68 K/uL  Hemoglobin : 10.9 g/dL  Hematocrit : 34.0 %  Platelet Count - Automated : 164 K/uL  Mean Cell Volume : 91.6 fl  Mean Cell Hemoglobin : 29.4 pg  Mean Cell Hemoglobin Concentration : 32.1 gm/dL  Auto Neutrophil # : x  Auto Lymphocyte # : x  Auto Monocyte # : x  Auto Eosinophil # : x  Auto Basophil # : x  Auto Neutrophil % : x  Auto Lymphocyte % : x  Auto Monocyte % : x  Auto Eosinophil % : x  Auto Basophil % : x    02-03    138  |  102  |  11  ----------------------------<  117<H>  4.1   |  26  |  0.50    Ca    8.5      03 Feb 2019 08:05            CAPILLARY BLOOD GLUCOSE          RADIOLOGY & ADDITIONAL TESTS: 83y Female presents to [facility] ED s/p [facility] fall c/o severe left hip pain and inability to ambulate.  Patient denies headstrike or LOC. Pt aide witnessed the fall, she states pt tripped over her feet and landed onto her L hip. Localizes pain to Left hip/femur. Patient denies radiation of pain. Patient denies numbness/tingling/burning in the LLE. No other bone/joint complaints. Patient is a community ambulator at baseline without assistive devices. Patient has no issues w/ADL's 2/2 dementia. tolerated procedure well    MEDICATIONS  (STANDING):  aspirin enteric coated 325 milliGRAM(s) Oral two times a day  donepezil 5 milliGRAM(s) Oral daily  lactated ringers. 1000 milliLiter(s) (75 mL/Hr) IV Continuous <Continuous>  multivitamin 1 Tablet(s) Oral daily  pantoprazole    Tablet 40 milliGRAM(s) Oral before breakfast  polyethylene glycol 3350 17 Gram(s) Oral at bedtime    MEDICATIONS  (PRN):  acetaminophen   Tablet .. 975 milliGRAM(s) Oral every 8 hours PRN Moderate Pain (4 - 6)  haloperidol     Tablet 2 milliGRAM(s) Oral every 6 hours PRN agitation  haloperidol    Injectable 0.5 milliGRAM(s) IV Push every 6 hours PRN severe agitation  HYDROmorphone  Injectable 0.25 milliGRAM(s) IV Push every 4 hours PRN breakthrough  melatonin 3 milliGRAM(s) Oral at bedtime PRN Insomnia  ondansetron Injectable 4 milliGRAM(s) IV Push every 6 hours PRN Nausea and/or Vomiting  oxyCODONE    IR 2.5 milliGRAM(s) Oral every 4 hours PRN Severe Pain (7 - 10)  traMADol 25 milliGRAM(s) Oral every 4 hours PRN Moderate Pain (4 - 6)    Vital Signs Last 24 Hrs  T(C): 37.3 (06 Feb 2019 00:02), Max: 37.3 (06 Feb 2019 00:02)  T(F): 99.1 (06 Feb 2019 00:02), Max: 99.1 (06 Feb 2019 00:02)  HR: 115 (06 Feb 2019 00:02) (91 - 119)  BP: 132/78 (06 Feb 2019 00:02) (109/71 - 132/78)  BP(mean): --  RR: 18 (06 Feb 2019 00:02) (16 - 18)  SpO2: 96% (06 Feb 2019 00:02) (95% - 96%)        PHYSICAL EXAM:  GENERAL: NAD, well nourished (+) DEMENTIA  HEAD:  Atraumatic  EYES: EOM, PERRLA, conjunctiva pink and sclera white  ENT: No tonsillar erythema, exudates, or enlargement, moist mucous membranes, good dentition, no lesions  NECK: Supple, No JVD, normal thyroid, carotids with normal upstrokes and no bruits  CHEST/LUNG: Clear to auscultation bilaterally, No rales, rhonchi, wheezing, or rubs  HEART: Regular rate and rhythm, No murmurs, rubs, or gallops  ABDOMEN: Soft, nondistended, no masses, guarding, tenderness or rebound, bowel sounds present  EXTREMITIES:  2+ Peripheral Pulses, No clubbing, cyanosis, or edema. LEFT HIP FRACTURE=IM NAIL  LYMPH: No lymphadenopathy noted  SKIN: No rashes or lesions  NERVOUS SYSTEM:  Alert & Oriented X3, normal cognitive function. Motor Strength 5/5 right upper and right lower.  5/5 left upper and left lower extremities, DTRs 2+ intact and symmetric    LABS:    No labs obtained today 83y Female presents to [facility] ED s/p [facility] fall c/o severe left hip pain and inability to ambulate.  Patient denies headstrike or LOC. Pt aide witnessed the fall, she states pt tripped over her feet and landed onto her L hip. Localizes pain to Left hip/femur. Patient denies radiation of pain. Patient denies numbness/tingling/burning in the LLE. No other bone/joint complaints. Patient is a community ambulator at baseline without assistive devices. Patient has no issues w/ADL's 2/2 dementia. The patient underwent left hip IM nail placement for ORIF on 02/02/19.  She is now out of bed to chair and feeling well. She is beginning to ambulate with decreasing pain  each day.    MEDICATIONS  (STANDING):  aspirin enteric coated 325 milliGRAM(s) Oral two times a day  donepezil 5 milliGRAM(s) Oral daily  lactated ringers. 1000 milliLiter(s) (75 mL/Hr) IV Continuous <Continuous>  multivitamin 1 Tablet(s) Oral daily  pantoprazole    Tablet 40 milliGRAM(s) Oral before breakfast  polyethylene glycol 3350 17 Gram(s) Oral at bedtime    MEDICATIONS  (PRN):  acetaminophen   Tablet .. 975 milliGRAM(s) Oral every 8 hours PRN Moderate Pain (4 - 6)  haloperidol     Tablet 2 milliGRAM(s) Oral every 6 hours PRN agitation  haloperidol    Injectable 0.5 milliGRAM(s) IV Push every 6 hours PRN severe agitation  HYDROmorphone  Injectable 0.25 milliGRAM(s) IV Push every 4 hours PRN breakthrough  melatonin 3 milliGRAM(s) Oral at bedtime PRN Insomnia  ondansetron Injectable 4 milliGRAM(s) IV Push every 6 hours PRN Nausea and/or Vomiting  oxyCODONE    IR 2.5 milliGRAM(s) Oral every 4 hours PRN Severe Pain (7 - 10)  traMADol 25 milliGRAM(s) Oral every 4 hours PRN Moderate Pain (4 - 6)    Vital Signs Last 24 Hrs  T(C): 37.3 (06 Feb 2019 00:02), Max: 37.3 (06 Feb 2019 00:02)  T(F): 99.1 (06 Feb 2019 00:02), Max: 99.1 (06 Feb 2019 00:02)  HR: 115 (06 Feb 2019 00:02) (91 - 119)  BP: 132/78 (06 Feb 2019 00:02) (109/71 - 132/78)  BP(mean): --  RR: 18 (06 Feb 2019 00:02) (16 - 18)  SpO2: 96% (06 Feb 2019 00:02) (95% - 96%)        PHYSICAL EXAM:  GENERAL: NAD, well nourished (+) DEMENTIA  HEAD:  Atraumatic  EYES: EOM, PERRLA, conjunctiva pink and sclera white  ENT: No tonsillar erythema, exudates, or enlargement, moist mucous membranes, good dentition, no lesions  NECK: Supple, No JVD, normal thyroid, carotids with normal upstrokes and no bruits  CHEST/LUNG: Clear to auscultation bilaterally, No rales, rhonchi, wheezing, or rubs  HEART: Regular rate and rhythm, No murmurs, rubs, or gallops  ABDOMEN: Soft, nondistended, no masses, guarding, tenderness or rebound, bowel sounds present  EXTREMITIES:  2+ Peripheral Pulses, No clubbing, cyanosis, or edema. LEFT HIP FRACTURE=IM NAIL  LYMPH: No lymphadenopathy noted  SKIN: No rashes or lesions  NERVOUS SYSTEM:  Alert & Oriented X3, normal cognitive function. Motor Strength 5/5 right upper and right lower.  5/5 left upper and left lower extremities, DTRs 2+ intact and symmetric    LABS:    No labs obtained today

## 2019-02-05 NOTE — PROGRESS NOTE ADULT - SUBJECTIVE AND OBJECTIVE BOX
ORTHO PROGRESS NOTE     Pt seen and examined at bedside. No significant overnight events. Pain well controlled.     Vital Signs Last 24 Hrs  T(C): 36.7 (05 Feb 2019 05:11), Max: 37.2 (04 Feb 2019 16:07)  T(F): 98.1 (05 Feb 2019 05:11), Max: 99 (04 Feb 2019 21:45)  HR: 91 (05 Feb 2019 05:11) (91 - 123)  BP: 116/76 (05 Feb 2019 05:11) (101/62 - 137/89)  BP(mean): --  RR: 18 (05 Feb 2019 05:11) (18 - 18)  SpO2: 95% (05 Feb 2019 05:11) (93% - 97%)    Gen: No apparent distress, alert  LE:  Dressing C/D/I;          +DF/PF. moving toes          SILT, wwp at foot          compartments soft    Labs:  CBC Full  -  ( 03 Feb 2019 09:09 )  WBC Count : 7.68 K/uL  Hemoglobin : 10.9 g/dL  Hematocrit : 34.0 %  Platelet Count - Automated : 164 K/uL  Mean Cell Volume : 91.6 fl  Mean Cell Hemoglobin : 29.4 pg  Mean Cell Hemoglobin Concentration : 32.1 gm/dL  Auto Neutrophil # : x  Auto Lymphocyte # : x  Auto Monocyte # : x  Auto Eosinophil # : x  Auto Basophil # : x  Auto Neutrophil % : x  Auto Lymphocyte % : x  Auto Monocyte % : x  Auto Eosinophil % : x  Auto Basophil % : x      02-03    138  |  102  |  11  ----------------------------<  117<H>  4.1   |  26  |  0.50    Ca    8.5      03 Feb 2019 08:05        A/P  Pt is a 83y old Female s/p L hip IMN, POD #3    - Pain control/ Analgesia  - DVT ppx  - PT/OT - wbat/oob    - WBAT  -rehab today

## 2019-02-05 NOTE — PROGRESS NOTE ADULT - ATTENDING COMMENTS
I am a non participating [employer] physician seeing Pt in coverage for Dr Jason Beginning to ambulate and doing well. No medical complications post-op to date and to proceed with physical therapy, as tolerated. Continues pulmonary toilet to lessen atelectasis, leg exercises as taught to lessen the risk of DVT and supervised pain medications for post-op pain control. I anticipate transfer to rehabilitation to follow when cleared by orthopedics.

## 2019-02-05 NOTE — PROGRESS NOTE ADULT - ASSESSMENT
83y Female presents to [facility] ED [facility]/o severe left hip pain and inability to ambulate.  Patient denies headstrike or LOC. Pt aide witnessed the fall, she states pt tripped over her feet and landed onto her L hip. Localizes pain to Left hip/femur. Patient denies radiation of pain. Patient denies numbness/tingling/burning in the LLE. No other bone/joint complaints. Patient is a community ambulator at baseline without assistive devices. Patient has no issues w/ADL's 2/2 dementia. (01 Feb 2019 21:21  MEDICALLY STABLE TO PROCEED WITH SURGERY AS PLANNED.  OBTAIN LEFT SHOULDER XRAYS AS CHEST  FILM SUSPICIOUS FOR A LUCENCY SIGNIFYING  A POSSIBLE FRACTURE 83y Female [facility] left hip pain and inability to ambulate.  Patient denies headstrike or LOC. Pt aide witnessed the fall, she states pt tripped over her feet and landed onto her L hip. Localizes pain to Left hip/femur. Patient denies radiation of pain. Patient denies numbness/tingling/burning in the LLE. No other bone/joint complaints. Patient is a community ambulator at baseline without assistive devices. Patient has no issues w/ADL's 2/2 dementia. The patient underwent left hip IM nail placement for ORIF on 02/02/19.  She is now out of bed to chair and feeling well. She is beginning to ambulate with decreasing pain  each day.

## 2019-02-06 RX ADMIN — PANTOPRAZOLE SODIUM 40 MILLIGRAM(S): 20 TABLET, DELAYED RELEASE ORAL at 05:40

## 2019-02-06 RX ADMIN — DONEPEZIL HYDROCHLORIDE 5 MILLIGRAM(S): 10 TABLET, FILM COATED ORAL at 12:15

## 2019-02-06 RX ADMIN — Medication 1 TABLET(S): at 12:15

## 2019-02-06 RX ADMIN — HALOPERIDOL DECANOATE 2 MILLIGRAM(S): 100 INJECTION INTRAMUSCULAR at 15:04

## 2019-02-06 RX ADMIN — Medication 325 MILLIGRAM(S): at 12:15

## 2019-02-06 RX ADMIN — Medication 325 MILLIGRAM(S): at 21:32

## 2019-02-06 NOTE — PROGRESS NOTE ADULT - ASSESSMENT
83y Female presents to [facility] ED s/p [facility] fall c/o severe left hip pain and inability to ambulate. awake and alert, pain seems well controlled  \

## 2019-02-06 NOTE — PROGRESS NOTE ADULT - ASSESSMENT
Impression: Stable       Plan:   Continue present treatment                 Out of bed, ambulate, weight bearing as tolerated                  Physical therapy follow up                  Continue to monitor

## 2019-02-06 NOTE — DIETITIAN INITIAL EVALUATION ADULT. - NS AS NUTRI INTERV MEALS SNACK
Continue to provide current diet order, no therapeutic diet restrictions indicated at this time. Continue to obtain/provide food preferences as feasible./General/healthful diet

## 2019-02-06 NOTE — PROGRESS NOTE ADULT - ATTENDING COMMENTS
Beginning to ambulate and doing well. No medical complications post-op to date and to proceed with physical therapy, as tolerated. Continues pulmonary toilet to lessen atelectasis, leg exercises as taught to lessen the risk of DVT and supervised pain medications for post-op pain control. I anticipate transfer to rehabilitation to follow when cleared by orthopedics. Beginning to ambulate and doing well. No medical complications post-op to date and to proceed with physical therapy, as tolerated. Continues pulmonary toilet to lessen atelectasis, leg exercises as taught to lessen the risk of DVT and supervised pain medications for post-op pain control. I anticipate transfer to rehabilitation to follow when cleared by orthopedics. I am a non participating [facility] physician seeing Pt in coverage for Dr Jason

## 2019-02-06 NOTE — DIETITIAN INITIAL EVALUATION ADULT. - ENERGY NEEDS
Height: 66 inches (question accuracy), Weight: 105 pounds (dosing, question accuracy)  BMI: 16.9 kg/m2 (highly question accuracy) IBW: 130 pounds (+/-10%), %IBW: 81%  Pertinent Info: 2+ edema to L hip noted, no pressure injuries noted at this time in nursing flow sheet.  Other pertinent info: Pt is 83yoF with PMH significant for dementia/Alzheimer's presenting s/p mechanical fall witness by aide and found with L hip fracture. s/p L hip IMN. POD #3.

## 2019-02-06 NOTE — PROGRESS NOTE ADULT - SUBJECTIVE AND OBJECTIVE BOX
ORTHO  Patient is a 83y old  Female who presents with a chief complaint of Left hip fracture (05 Feb 2019 18:05)    Pt. resting without complaint, pleasantly confused    VS-  T(C): 37.1 (02-06-19 @ 05:14), Max: 37.3 (02-06-19 @ 00:02)  HR: 101 (02-06-19 @ 05:14) (96 - 119)  BP: 129/78 (02-06-19 @ 05:14) (109/71 - 132/78)  RR: 18 (02-06-19 @ 05:14) (16 - 18)  SpO2: 96% (02-06-19 @ 05:14) (95% - 96%)  Wt(kg): --    M.S. Alert  Extremity- Left LE dressing C/D/I, d/c'd, Wounds C/D/I  Neuro-              Motor- (+)ankle DF/PF              Sensation- grossly intact to light touch              Calves- soft, nontender- PAS

## 2019-02-06 NOTE — DIETITIAN INITIAL EVALUATION ADULT. - PHYSICAL APPEARANCE
other (specify)/Per chart, pt with BMI of 16.9 based on wt 105 lbs and ht 66". Highly question accuracy of both. Nutrition Focused Physical Exam performed with pt's verbal consent and despite pt appearing on the thin side, no signs of muscle wasting or fat loss found. Aide unable to provide pt ht/wt.

## 2019-02-06 NOTE — PROGRESS NOTE ADULT - SUBJECTIVE AND OBJECTIVE BOX
83y Female presents to [facility] ED s/p [facility] fall c/o severe left hip pain and inability to ambulate.  Patient denies headstrike or LOC. Pt aide witnessed the fall, she states pt tripped over her feet and landed onto her L hip. Localizes pain to Left hip/femur. Patient denies radiation of pain. Patient denies numbness/tingling/burning in the LLE. No other bone/joint complaints. Patient is a community ambulator at baseline without assistive devices. Patient has no issues w/ADL's 2/2 dementia. The patient underwent left hip IM nail placement for ORIF on 02/02/19.  She is now out of bed to chair and feeling well. She is beginning to ambulate with decreasing pain  each day.    MEDICATIONS  (STANDING):  aspirin enteric coated 325 milliGRAM(s) Oral two times a day  donepezil 5 milliGRAM(s) Oral daily  lactated ringers. 1000 milliLiter(s) (75 mL/Hr) IV Continuous <Continuous>  multivitamin 1 Tablet(s) Oral daily  pantoprazole    Tablet 40 milliGRAM(s) Oral before breakfast  polyethylene glycol 3350 17 Gram(s) Oral at bedtime    MEDICATIONS  (PRN):  haloperidol     Tablet 2 milliGRAM(s) Oral every 6 hours PRN agitation  haloperidol    Injectable 0.5 milliGRAM(s) IV Push every 6 hours PRN severe agitation  HYDROmorphone  Injectable 0.25 milliGRAM(s) IV Push every 4 hours PRN breakthrough  melatonin 3 milliGRAM(s) Oral at bedtime PRN Insomnia  ondansetron Injectable 4 milliGRAM(s) IV Push every 6 hours PRN Nausea and/or Vomiting  oxyCODONE    IR 2.5 milliGRAM(s) Oral every 4 hours PRN Severe Pain (7 - 10)  traMADol 25 milliGRAM(s) Oral every 4 hours PRN Moderate Pain (4 - 6)          VITALS:   T(C): 37 (02-06-19 @ 21:50), Max: 37.3 (02-06-19 @ 00:02)  HR: 106 (02-06-19 @ 21:50) (101 - 115)  BP: 129/81 (02-06-19 @ 21:50) (116/80 - 140/82)  RR: 18 (02-06-19 @ 21:50) (17 - 18)  SpO2: 95% (02-06-19 @ 21:50) (95% - 97%)  Wt(kg): --    PHYSICAL EXAM:  GENERAL: NAD, well nourished (+) DEMENTIA  HEAD:  Atraumatic  EYES: EOM, PERRLA, conjunctiva pink and sclera white  ENT: No tonsillar erythema, exudates, or enlargement, moist mucous membranes, good dentition, no lesions  NECK: Supple, No JVD, normal thyroid, carotids with normal upstrokes and no bruits  CHEST/LUNG: Clear to auscultation bilaterally, No rales, rhonchi, wheezing, or rubs  HEART: Regular rate and rhythm, No murmurs, rubs, or gallops  ABDOMEN: Soft, nondistended, no masses, guarding, tenderness or rebound, bowel sounds present  EXTREMITIES:  2+ Peripheral Pulses, No clubbing, cyanosis, or edema. LEFT HIP FRACTURE=IM NAIL  LYMPH: No lymphadenopathy noted  SKIN: No rashes or lesions  NERVOUS SYSTEM:  Alert & Oriented X3, normal cognitive function. Motor Strength 5/5 right upper and right lower.  5/5 left upper and left lower extremities, DTRs 2+ intact and symmetric    LABS:                      CAPILLARY BLOOD GLUCOSE          RADIOLOGY & ADDITIONAL TESTS:

## 2019-02-06 NOTE — DIETITIAN INITIAL EVALUATION ADULT. - PT NOT SOURCE
confused/pt with dementia/Alzheimer's per chart, unable to participate appropriately in RD interview

## 2019-02-06 NOTE — DIETITIAN INITIAL EVALUATION ADULT. - OTHER INFO
Pt seen for BMI <19 per chart, highly question accuracy of BMI in chart. Pt eating well in-patient per aide x 6 days. Pt has no c/o nausea, vomiting, diarrhea, or constipation, no swallowing difficulties. Aide notes some difficulty chewing tougher meats/chicken, but does not feel a texture modified diet is warranted. Aide has been selecting softer items from the menu and agrees to chopped proteins. Food preferences to be updated. No noticeable recent wt changes reported. Aide unable to provide pt UBW. Per chart pt weight 105 lbs, question accuracy. Aide has no nutrition related questions/concerns at this time.

## 2019-02-07 VITALS
DIASTOLIC BLOOD PRESSURE: 79 MMHG | SYSTOLIC BLOOD PRESSURE: 121 MMHG | HEART RATE: 88 BPM | RESPIRATION RATE: 18 BRPM | TEMPERATURE: 98 F | OXYGEN SATURATION: 96 %

## 2019-02-07 PROCEDURE — 76377 3D RENDER W/INTRP POSTPROCES: CPT

## 2019-02-07 PROCEDURE — C1713: CPT

## 2019-02-07 PROCEDURE — 76000 FLUOROSCOPY <1 HR PHYS/QHP: CPT

## 2019-02-07 PROCEDURE — 82803 BLOOD GASES ANY COMBINATION: CPT

## 2019-02-07 PROCEDURE — 97161 PT EVAL LOW COMPLEX 20 MIN: CPT

## 2019-02-07 PROCEDURE — 97530 THERAPEUTIC ACTIVITIES: CPT

## 2019-02-07 PROCEDURE — 96374 THER/PROPH/DIAG INJ IV PUSH: CPT

## 2019-02-07 PROCEDURE — C1769: CPT

## 2019-02-07 PROCEDURE — 82330 ASSAY OF CALCIUM: CPT

## 2019-02-07 PROCEDURE — 71045 X-RAY EXAM CHEST 1 VIEW: CPT

## 2019-02-07 PROCEDURE — 86900 BLOOD TYPING SEROLOGIC ABO: CPT

## 2019-02-07 PROCEDURE — 83605 ASSAY OF LACTIC ACID: CPT

## 2019-02-07 PROCEDURE — 86850 RBC ANTIBODY SCREEN: CPT

## 2019-02-07 PROCEDURE — 86901 BLOOD TYPING SEROLOGIC RH(D): CPT

## 2019-02-07 PROCEDURE — 72170 X-RAY EXAM OF PELVIS: CPT

## 2019-02-07 PROCEDURE — 84295 ASSAY OF SERUM SODIUM: CPT

## 2019-02-07 PROCEDURE — 99285 EMERGENCY DEPT VISIT HI MDM: CPT | Mod: 25

## 2019-02-07 PROCEDURE — 85014 HEMATOCRIT: CPT

## 2019-02-07 PROCEDURE — 97116 GAIT TRAINING THERAPY: CPT

## 2019-02-07 PROCEDURE — 82435 ASSAY OF BLOOD CHLORIDE: CPT

## 2019-02-07 PROCEDURE — 85610 PROTHROMBIN TIME: CPT

## 2019-02-07 PROCEDURE — 93005 ELECTROCARDIOGRAM TRACING: CPT

## 2019-02-07 PROCEDURE — 84132 ASSAY OF SERUM POTASSIUM: CPT

## 2019-02-07 PROCEDURE — 80048 BASIC METABOLIC PNL TOTAL CA: CPT

## 2019-02-07 PROCEDURE — 85730 THROMBOPLASTIN TIME PARTIAL: CPT

## 2019-02-07 PROCEDURE — 97166 OT EVAL MOD COMPLEX 45 MIN: CPT

## 2019-02-07 PROCEDURE — 73502 X-RAY EXAM HIP UNI 2-3 VIEWS: CPT

## 2019-02-07 PROCEDURE — 80053 COMPREHEN METABOLIC PANEL: CPT

## 2019-02-07 PROCEDURE — 73552 X-RAY EXAM OF FEMUR 2/>: CPT

## 2019-02-07 PROCEDURE — 82947 ASSAY GLUCOSE BLOOD QUANT: CPT

## 2019-02-07 PROCEDURE — 85027 COMPLETE CBC AUTOMATED: CPT

## 2019-02-07 PROCEDURE — 72192 CT PELVIS W/O DYE: CPT

## 2019-02-07 RX ADMIN — Medication 325 MILLIGRAM(S): at 11:02

## 2019-02-07 RX ADMIN — PANTOPRAZOLE SODIUM 40 MILLIGRAM(S): 20 TABLET, DELAYED RELEASE ORAL at 09:01

## 2019-02-07 RX ADMIN — DONEPEZIL HYDROCHLORIDE 5 MILLIGRAM(S): 10 TABLET, FILM COATED ORAL at 11:03

## 2019-02-07 RX ADMIN — Medication 1 TABLET(S): at 12:36

## 2019-02-07 NOTE — PROGRESS NOTE ADULT - PROBLEM SELECTOR PROBLEM 1
Alzheimer disease

## 2019-02-07 NOTE — PROGRESS NOTE ADULT - ASSESSMENT
83y Female presents to [facility] ED s/p [facility] fall c/o severe left hip pain and inability to ambulate. awake and alert, pain seems well controlled  \

## 2019-02-07 NOTE — PROGRESS NOTE ADULT - PROBLEM SELECTOR PROBLEM 2
Closed fracture of left hip, initial encounter

## 2019-02-07 NOTE — PROGRESS NOTE ADULT - PROBLEM SELECTOR PLAN 1
If agitated use Seroquel bid prn
If agitated use Seroquel bid prn  Patient has been calm
If agitated use Seroquel bid prn  Patient has been calm

## 2019-02-07 NOTE — PROGRESS NOTE ADULT - SUBJECTIVE AND OBJECTIVE BOX
83y Female presents to [facility] ED s/p [facility] fall c/o severe left hip pain and inability to ambulate.  Patient denies headstrike or LOC. Pt aide witnessed the fall, she states pt tripped over her feet and landed onto her L hip. Localizes pain to Left hip/femur. Patient denies radiation of pain. Patient denies numbness/tingling/burning in the LLE. No other bone/joint complaints. Patient is a community ambulator at baseline without assistive devices. Patient has no issues w/ADL's 2/2 dementia. The patient underwent left hip IM nail placement for ORIF on 02/02/19.  She is now out of bed to chair and feeling well. She is beginning to ambulate with decreasing pain  each day.    MEDICATIONS  (STANDING):  aspirin enteric coated 325 milliGRAM(s) Oral two times a day  donepezil 5 milliGRAM(s) Oral daily  lactated ringers. 1000 milliLiter(s) (75 mL/Hr) IV Continuous <Continuous>  multivitamin 1 Tablet(s) Oral daily  pantoprazole    Tablet 40 milliGRAM(s) Oral before breakfast  polyethylene glycol 3350 17 Gram(s) Oral at bedtime    MEDICATIONS  (PRN):  haloperidol     Tablet 2 milliGRAM(s) Oral every 6 hours PRN agitation  haloperidol    Injectable 0.5 milliGRAM(s) IV Push every 6 hours PRN severe agitation  HYDROmorphone  Injectable 0.25 milliGRAM(s) IV Push every 4 hours PRN breakthrough  melatonin 3 milliGRAM(s) Oral at bedtime PRN Insomnia  ondansetron Injectable 4 milliGRAM(s) IV Push every 6 hours PRN Nausea and/or Vomiting  oxyCODONE    IR 2.5 milliGRAM(s) Oral every 4 hours PRN Severe Pain (7 - 10)  traMADol 25 milliGRAM(s) Oral every 4 hours PRN Moderate Pain (4 - 6)          VITALS:   T(C): 37 (02-06-19 @ 21:50), Max: 37.3 (02-06-19 @ 00:02)  HR: 106 (02-06-19 @ 21:50) (101 - 115)  BP: 129/81 (02-06-19 @ 21:50) (116/80 - 140/82)  RR: 18 (02-06-19 @ 21:50) (17 - 18)  SpO2: 95% (02-06-19 @ 21:50) (95% - 97%)  Wt(kg): --    PHYSICAL EXAM:  GENERAL: NAD, well nourished (+) DEMENTIA  HEAD:  Atraumatic  EYES: EOM, PERRLA, conjunctiva pink and sclera white  ENT: No tonsillar erythema, exudates, or enlargement, moist mucous membranes, good dentition, no lesions  NECK: Supple, No JVD, normal thyroid, carotids with normal upstrokes and no bruits  CHEST/LUNG: Clear to auscultation bilaterally, No rales, rhonchi, wheezing, or rubs  HEART: Regular rate and rhythm, No murmurs, rubs, or gallops  ABDOMEN: Soft, nondistended, no masses, guarding, tenderness or rebound, bowel sounds present  EXTREMITIES:  2+ Peripheral Pulses, No clubbing, cyanosis, or edema. LEFT HIP FRACTURE=IM NAIL  LYMPH: No lymphadenopathy noted  SKIN: No rashes or lesions  NERVOUS SYSTEM:  Alert & Oriented X3, normal cognitive function. Motor Strength 5/5 right upper and right lower.  5/5 left upper and left lower extremities, DTRs 2+ intact and symmetric    LABS:                      CAPILLARY BLOOD GLUCOSE          RADIOLOGY & ADDITIONAL TESTS:

## 2019-02-07 NOTE — PROGRESS NOTE ADULT - PROVIDER SPECIALTY LIST ADULT
Internal Medicine
Orthopedics
Internal Medicine

## 2019-02-07 NOTE — PROGRESS NOTE ADULT - REASON FOR ADMISSION
Left hip fracture

## 2019-02-07 NOTE — PROGRESS NOTE ADULT - NSHPATTENDINGPLANDISCUSS_GEN_ALL_CORE
patient  and  family
patient  and  ortho staff
patient and staff

## 2019-02-13 ENCOUNTER — APPOINTMENT (OUTPATIENT)
Dept: NEUROLOGY | Facility: CLINIC | Age: 84
End: 2019-02-13

## 2020-02-13 NOTE — DISCHARGE NOTE ADULT - CONDITIONS AT DISCHARGE
"      SICU PLAN OF CARE NOTE    Dx: Presence of left ventricular assist device (LVAD)    Shift Events: Patient updated on plan of care. No acute events during shift. All VSS. HM 3 speed increased to 5300 by Dr. Joshi. Nitric weaned to 1ppm. OOB to chair for 3 1/2 hours. All questions and concerns acknowledged and answered. See flow sheets for full assessment details. Will continue to monitor patient closely.    Goals of Care: Continue to wean Epi and nitric oxide per Dr Joshi's orders. PT/OT; LVAD education    Neuro: Follows Commands, Moves All Extremities and Confused    Vital Signs: BP (!) 84/0 (BP Location: Left arm, Patient Position: Lying)   Pulse (!) 112   Temp 98.2 °F (36.8 °C) (Oral)   Resp (!) 22   Ht 5' 7" (1.702 m)   Wt 78.6 kg (173 lb 4.5 oz)   SpO2 100%   BMI 27.14 kg/m²  CVP 22    Respiratory: Nasal Cannula 3L     Diet: Dental Soft; 1200ml FR    Gtts: Epinephrine, Dobutamine, Lasix and Heparin    Urine Output: Urinary Catheter 1.7L/shift    LVAD:  HM3  Speed 5300  LSL 4900  Flows: 4.6-4.9  PI: 2.9-3.2  Power: 3.8-4.0     Labs/Accuchecks: q6 BMP, Mag, pTT    Skin: LVAD driveline site oozing. Dr. Joshi aware. LVAD dressing completed using sterile technique. Midline incision with preveena in place. No skin breakdown to elbows, heels, or sacrum.           " stable

## 2021-11-14 ENCOUNTER — INPATIENT (INPATIENT)
Facility: HOSPITAL | Age: 86
LOS: 10 days | Discharge: NURSING, REHAB OR LONG-TERM CARE | DRG: 760 | End: 2021-11-25
Attending: INTERNAL MEDICINE | Admitting: INTERNAL MEDICINE
Payer: MEDICARE

## 2021-11-14 VITALS
RESPIRATION RATE: 16 BRPM | HEIGHT: 66 IN | DIASTOLIC BLOOD PRESSURE: 65 MMHG | WEIGHT: 128.97 LBS | SYSTOLIC BLOOD PRESSURE: 108 MMHG | HEART RATE: 66 BPM | OXYGEN SATURATION: 97 % | TEMPERATURE: 98 F

## 2021-11-14 DIAGNOSIS — Z96.641 PRESENCE OF RIGHT ARTIFICIAL HIP JOINT: Chronic | ICD-10-CM

## 2021-11-14 DIAGNOSIS — R31.9 HEMATURIA, UNSPECIFIED: ICD-10-CM

## 2021-11-14 DIAGNOSIS — Z96.652 PRESENCE OF LEFT ARTIFICIAL KNEE JOINT: Chronic | ICD-10-CM

## 2021-11-14 PROBLEM — G30.9 ALZHEIMER'S DISEASE, UNSPECIFIED: Chronic | Status: ACTIVE | Noted: 2019-02-01

## 2021-11-14 LAB
ALBUMIN SERPL ELPH-MCNC: 3.7 G/DL — SIGNIFICANT CHANGE UP (ref 3.3–5)
ALP SERPL-CCNC: 82 U/L — SIGNIFICANT CHANGE UP (ref 40–120)
ALT FLD-CCNC: 20 U/L — SIGNIFICANT CHANGE UP (ref 10–45)
ANION GAP SERPL CALC-SCNC: 10 MMOL/L — SIGNIFICANT CHANGE UP (ref 5–17)
APPEARANCE UR: CLEAR — SIGNIFICANT CHANGE UP
AST SERPL-CCNC: 13 U/L — SIGNIFICANT CHANGE UP (ref 10–40)
BACTERIA # UR AUTO: ABNORMAL
BILIRUB SERPL-MCNC: 0.4 MG/DL — SIGNIFICANT CHANGE UP (ref 0.2–1.2)
BILIRUB UR-MCNC: NEGATIVE — SIGNIFICANT CHANGE UP
BLD GP AB SCN SERPL QL: NEGATIVE — SIGNIFICANT CHANGE UP
BUN SERPL-MCNC: 12 MG/DL — SIGNIFICANT CHANGE UP (ref 7–23)
CALCIUM SERPL-MCNC: 9 MG/DL — SIGNIFICANT CHANGE UP (ref 8.4–10.5)
CHLORIDE SERPL-SCNC: 106 MMOL/L — SIGNIFICANT CHANGE UP (ref 96–108)
CO2 SERPL-SCNC: 26 MMOL/L — SIGNIFICANT CHANGE UP (ref 22–31)
COLOR SPEC: YELLOW — SIGNIFICANT CHANGE UP
CREAT SERPL-MCNC: 0.72 MG/DL — SIGNIFICANT CHANGE UP (ref 0.5–1.3)
DIFF PNL FLD: ABNORMAL
EPI CELLS # UR: 1 /HPF — SIGNIFICANT CHANGE UP
GLUCOSE SERPL-MCNC: 95 MG/DL — SIGNIFICANT CHANGE UP (ref 70–99)
GLUCOSE UR QL: NEGATIVE — SIGNIFICANT CHANGE UP
HCT VFR BLD CALC: 37.2 % — SIGNIFICANT CHANGE UP (ref 34.5–45)
HGB BLD-MCNC: 12 G/DL — SIGNIFICANT CHANGE UP (ref 11.5–15.5)
HYALINE CASTS # UR AUTO: 0 /LPF — SIGNIFICANT CHANGE UP (ref 0–2)
KETONES UR-MCNC: NEGATIVE — SIGNIFICANT CHANGE UP
LEUKOCYTE ESTERASE UR-ACNC: NEGATIVE — SIGNIFICANT CHANGE UP
MCHC RBC-ENTMCNC: 31 PG — SIGNIFICANT CHANGE UP (ref 27–34)
MCHC RBC-ENTMCNC: 32.3 GM/DL — SIGNIFICANT CHANGE UP (ref 32–36)
MCV RBC AUTO: 96.1 FL — SIGNIFICANT CHANGE UP (ref 80–100)
NITRITE UR-MCNC: POSITIVE
NRBC # BLD: 0 /100 WBCS — SIGNIFICANT CHANGE UP (ref 0–0)
PH UR: 7.5 — SIGNIFICANT CHANGE UP (ref 5–8)
PLATELET # BLD AUTO: 157 K/UL — SIGNIFICANT CHANGE UP (ref 150–400)
POTASSIUM SERPL-MCNC: 4.1 MMOL/L — SIGNIFICANT CHANGE UP (ref 3.5–5.3)
POTASSIUM SERPL-SCNC: 4.1 MMOL/L — SIGNIFICANT CHANGE UP (ref 3.5–5.3)
PROT SERPL-MCNC: 6.2 G/DL — SIGNIFICANT CHANGE UP (ref 6–8.3)
PROT UR-MCNC: ABNORMAL
RBC # BLD: 3.87 M/UL — SIGNIFICANT CHANGE UP (ref 3.8–5.2)
RBC # FLD: 13.1 % — SIGNIFICANT CHANGE UP (ref 10.3–14.5)
RBC CASTS # UR COMP ASSIST: 419 /HPF — HIGH (ref 0–4)
RH IG SCN BLD-IMP: POSITIVE — SIGNIFICANT CHANGE UP
SARS-COV-2 RNA SPEC QL NAA+PROBE: SIGNIFICANT CHANGE UP
SODIUM SERPL-SCNC: 142 MMOL/L — SIGNIFICANT CHANGE UP (ref 135–145)
SP GR SPEC: 1.02 — SIGNIFICANT CHANGE UP (ref 1.01–1.02)
UROBILINOGEN FLD QL: NEGATIVE — SIGNIFICANT CHANGE UP
WBC # BLD: 3.97 K/UL — SIGNIFICANT CHANGE UP (ref 3.8–10.5)
WBC # FLD AUTO: 3.97 K/UL — SIGNIFICANT CHANGE UP (ref 3.8–10.5)
WBC UR QL: 3 /HPF — SIGNIFICANT CHANGE UP (ref 0–5)

## 2021-11-14 PROCEDURE — 99285 EMERGENCY DEPT VISIT HI MDM: CPT | Mod: GC

## 2021-11-14 PROCEDURE — 99223 1ST HOSP IP/OBS HIGH 75: CPT

## 2021-11-14 PROCEDURE — 93010 ELECTROCARDIOGRAM REPORT: CPT | Mod: GC

## 2021-11-14 RX ORDER — CITALOPRAM 10 MG/1
10 TABLET, FILM COATED ORAL DAILY
Refills: 0 | Status: DISCONTINUED | OUTPATIENT
Start: 2021-11-14 | End: 2021-11-25

## 2021-11-14 RX ORDER — METOPROLOL TARTRATE 50 MG
25 TABLET ORAL DAILY
Refills: 0 | Status: DISCONTINUED | OUTPATIENT
Start: 2021-11-14 | End: 2021-11-25

## 2021-11-14 RX ORDER — ASCORBIC ACID 60 MG
500 TABLET,CHEWABLE ORAL DAILY
Refills: 0 | Status: DISCONTINUED | OUTPATIENT
Start: 2021-11-14 | End: 2021-11-25

## 2021-11-14 RX ORDER — ACETAMINOPHEN 500 MG
650 TABLET ORAL EVERY 6 HOURS
Refills: 0 | Status: DISCONTINUED | OUTPATIENT
Start: 2021-11-14 | End: 2021-11-25

## 2021-11-14 RX ORDER — HALOPERIDOL DECANOATE 100 MG/ML
2 INJECTION INTRAMUSCULAR ONCE
Refills: 0 | Status: COMPLETED | OUTPATIENT
Start: 2021-11-14 | End: 2021-11-14

## 2021-11-14 RX ORDER — SENNA PLUS 8.6 MG/1
1 TABLET ORAL AT BEDTIME
Refills: 0 | Status: DISCONTINUED | OUTPATIENT
Start: 2021-11-14 | End: 2021-11-25

## 2021-11-14 RX ORDER — PREGABALIN 225 MG/1
1000 CAPSULE ORAL DAILY
Refills: 0 | Status: DISCONTINUED | OUTPATIENT
Start: 2021-11-14 | End: 2021-11-25

## 2021-11-14 RX ORDER — TRAZODONE HCL 50 MG
50 TABLET ORAL AT BEDTIME
Refills: 0 | Status: DISCONTINUED | OUTPATIENT
Start: 2021-11-14 | End: 2021-11-25

## 2021-11-14 RX ORDER — HALOPERIDOL DECANOATE 100 MG/ML
1 INJECTION INTRAMUSCULAR
Qty: 0 | Refills: 0 | DISCHARGE

## 2021-11-14 RX ORDER — DONEPEZIL HYDROCHLORIDE 10 MG/1
5 TABLET, FILM COATED ORAL AT BEDTIME
Refills: 0 | Status: DISCONTINUED | OUTPATIENT
Start: 2021-11-14 | End: 2021-11-25

## 2021-11-14 RX ORDER — FUROSEMIDE 40 MG
20 TABLET ORAL DAILY
Refills: 0 | Status: DISCONTINUED | OUTPATIENT
Start: 2021-11-14 | End: 2021-11-25

## 2021-11-14 RX ORDER — RISPERIDONE 4 MG/1
0.25 TABLET ORAL THREE TIMES A DAY
Refills: 0 | Status: DISCONTINUED | OUTPATIENT
Start: 2021-11-14 | End: 2021-11-25

## 2021-11-14 RX ADMIN — HALOPERIDOL DECANOATE 2 MILLIGRAM(S): 100 INJECTION INTRAMUSCULAR at 12:52

## 2021-11-14 RX ADMIN — HALOPERIDOL DECANOATE 2 MILLIGRAM(S): 100 INJECTION INTRAMUSCULAR at 12:23

## 2021-11-14 NOTE — ED PROVIDER NOTE - OBJECTIVE STATEMENT
Per EMS, pt coming from [place] with concern of vaginal bleeding. Pt has a history of dementia. Pt ambulates independently. Pt unclear as to why she's here, denies any complaints. Per EMS, pt coming from [place] with concern of vaginal bleeding. Pt has a history of dementia. Pt ambulates independently. Pt unclear as to why she's here, denies any complaints.    Obtained collateral from Pt's Son and Daughter in Law: Pt has had vaginal bleeding on and off for 1 mo. Was seen by MD at [facility] who thought she had a UTI, has been treated for multiple UTI's without resolution of bleeding.

## 2021-11-14 NOTE — ED PROVIDER NOTE - ATTENDING CONTRIBUTION TO CARE
attending Rosalind: pt with dementia, presents from assisted living facility for concern for vaginal bleeding. As per family, pt with intermittent vaginal bleeding x several weeks. Has been treated recently for UTI. Will obtain labs, UA/Ucx, attempt vaginal US, reassess

## 2021-11-14 NOTE — ED PROVIDER NOTE - NSICDXPASTSURGICALHX_GEN_ALL_CORE_FT
PAST SURGICAL HISTORY:  H/O total hip arthroplasty, right     History of total knee arthroplasty, left

## 2021-11-14 NOTE — ED PROVIDER NOTE - PHYSICAL EXAMINATION
General: WN/WD NAD  Head: Atraumatic, normocephalic  Eyes: EOM grossly in tact, no scleral icterus  ENT: moist mucous membranes  Neurology: A&Ox1, HERNANDES x 4, confused   Respiratory: CTAB, no wheezing, normal respiratory effort  CV: RRR, good s1/s2, no S3, Extremities warm and well perfused  Abdominal: Soft, non-distended, non-tender, no masses  Extremities: No edema, no deformities  Skin: warm and dry. No rashes  : General: WN/WD NAD  Head: Atraumatic, normocephalic  Eyes: EOM grossly in tact, no scleral icterus  ENT: moist mucous membranes  Neurology: A&Ox1, HERNANDES x 4, confused   Respiratory: CTAB, no wheezing, normal respiratory effort  CV: RRR, good s1/s2, no S3, Extremities warm and well perfused  Abdominal: Soft, non-distended, non-tender, no masses  Extremities: No edema, no deformities  Skin: warm and dry. No rashes  : rectal exam performed with ed tech General: WN/WD NAD  Head: Atraumatic, normocephalic  Eyes: EOM grossly in tact, no scleral icterus  ENT: moist mucous membranes  Neurology: A&Ox1, HERNANDES x 4, confused   Respiratory: CTAB, no wheezing, normal respiratory effort  CV: RRR, good s1/s2, no S3, Extremities warm and well perfused  Abdominal: Soft, non-distended, non-tender, no masses  Extremities: No edema, no deformities  Skin: warm and dry. No rashes  : rectal exam performed with ed tech, light brown stool in rectal vault, no hiwot blood  external vaginal exam: no lesions or lacerations, blood between labia, no blood seen near vaginal introitus

## 2021-11-14 NOTE — ED PROVIDER NOTE - CLINICAL SUMMARY MEDICAL DECISION MAKING FREE TEXT BOX
85 yo female presents from the [facility] with concern of vaginal bleeding. On exam pt's labia covered in blood, no active bleeding from the introitus. 85 yo female presents from the [facility] with concern of vaginal bleeding. On exam pt's labia covered in blood, no active bleeding from the introitus. This is concerning for possible gynecologic malignancy such as endometrial cancer, or possible cervical lesion. sent stool occult blood, labs, will order TVUS to evaluate endometrial lining. Pt currently hemodynamically stable with bp 120 systolic.

## 2021-11-14 NOTE — H&P ADULT - NSHPPHYSICALEXAM_GEN_ALL_CORE
T(C): 37 (11-14-21 @ 15:11), Max: 37 (11-14-21 @ 15:11)  HR: 68 (11-14-21 @ 15:11) (66 - 77)  BP: 143/74 (11-14-21 @ 15:11) (108/65 - 145/72)  RR: 16 (11-14-21 @ 15:11) (16 - 16)  SpO2: 97% (11-14-21 @ 15:11) (97% - 99%)    GEN: female in NAD, appears comfortable, no diaphoresis  EYES: No scleral injection, PERRL, EOMI  ENTM: neck supple & symmetric without tracheal deviation, moist membranes, no gross hearing impairment, thyroid gland not enlarged  CV: +S1/S2, no m/r/g, no abdominal bruit, no LE edema  RESP: breathing comfortably, no respiratory accessory muscle use, CTAB, no w/r/r  GI: normoactive BS, soft, NTND, no rebounding/guarding, no palpable masses  LYMPHATICS: no LAD or tenderness to palpation  NEURO: AOx1, no focal deficits, CNII-XII grossly intact  PSYCH: No SI/HI/AVH, flat affect, poor insight/judgment   SKIN: no petechiae, ecchymosis or maculopapular rash noted

## 2021-11-14 NOTE — ED ADULT NURSE NOTE - OBJECTIVE STATEMENT
85 y/o female PMHX Dementia, Alzheimer's, HTN, UTI, on ASA, BIBA from [facility] for vaginal bleeding. pt is A+Ox1 to person. follows commands. pt denies any CP or SOB, denies any abdominal pain, denies any urinary symptoms. MD at bedside, pt cleaned skin intact on back, slight blanchable skin on buttocks, no blood noticed when wiping back, dried blood noted in front and cleaned. pt placed on cardiac monitor, safety precautions in place call bell in reach.

## 2021-11-14 NOTE — ED ADULT NURSE REASSESSMENT NOTE - NS ED NURSE REASSESS COMMENT FT1
Report received from [facility]. Pt a & o x 1, struggles to follow commands. Breathing spontaneous & nonlabored. Pt resting comfortably in bed but become agitated when taking vital signs or attempting to give oral medications. IV site patent, no signs of phlebitis, flushing without difficulty. 1:1 at bedside

## 2021-11-14 NOTE — ED PROVIDER NOTE - PROGRESS NOTE DETAILS
Angeli Machuca, PGY-1  Emergency Medicine  attempted to contact daughter to discuss their preferences for care. Angeli Machuca, PGY-1  Emergency Medicine  s/w family regarding pt, they consent to a pelvic exam and transvaginal ultrasound. They explain that this problem has been going on for a month. Angeli Machuca, PGY-1  Emergency Medicine  pt refusing ultrasound, will call pmd. Angeli Machuca, PGY-1  Emergency Medicine    called answering service for pt's pmd. Angeli Machuca, PGY-1  Emergency Medicine  pt agitated, not willing to stay in her bed. ecg shows QTC of 480 ms, will order 2 mg of IV haldol. Angeli Machuca, PGY-1  Emergency Medicine  attempted to reach family 3x. per dr. allen, pt may not get full workup at [facility], will admit to [facility] for full workup.    paged [facility] Angeli Machuca, PGY-1  Emergency Medicine  pt admitted to [facility], he wants gynecology to be called. Angeli Machuca, PGY-1  Emergency Medicine  s/w OB/gyn regarding, per resident without TVUS or ability to do exam, they Angeli Machuca, PGY-1  Emergency Medicine  s/w OB/gyn regarding, per resident without TVUS or ability to do exam, they cannot further evaluate the pt. family will need to be contacted for consent for sedated TVUS, if results show concern for malignancy or other gynecologic emergency, they can be re-consulted.

## 2021-11-14 NOTE — H&P ADULT - HISTORY OF PRESENT ILLNESS
86F with PMHx of HTN and Alzheimer's Dementia sent in from [facility] for vaginal bleeding. It apparently has been going on for several weeks. Patient with severe dementia and not able to endorse why she is here. The emergency room doctors attempted to perform vaginal scan, but patient agitated and refused. They also attempted to perform transvaginal US, but again patient was agitated and refused. She received Haldol 2 mg twice and is now more calm. Gynecology was called and stated if desired she would need to be sedated because these exams are required for working up post-menopausal vaginal US. Patient herself has no complaints.

## 2021-11-14 NOTE — H&P ADULT - ASSESSMENT
86F with PMHx of HTN and Alzheimer's Dementia sent in from [facility] for post-menopausal vaginal bleeding.    Post-menopausal Vaginal Bleeding  - Need to have GOC with family as work up would include pelvic exam and transvaginal US. Patient cannot tolerate these exams given cognitive impairment and would probably need to be heavily sedated. In addition, patient is hemodynamically stable and with stable hemoglobin thus far and does not necessarily need this worked up. Would have to broach what family would want if for example a thickened endometrial stripe was seen (i.e., would they want a biopsy). Can consider transabdominal US or CT A&P, but would not be ideal. Overall, favor outpatient work up and further discussion with family  - Monitor Hg  - Holding baby aspirin     Alzheimer's Dementia  - Continue with Donepezil  - Continue with Risperdal PRN & Celexa  - Trazodone    HTN  - Lasix & Metoprolol

## 2021-11-14 NOTE — H&P ADULT - NSHPADDITIONALINFOADULT_GEN_ALL_CORE
Admitted on behalf of Dr. Baljinder Prince  Pager: [phone]  Off-Hours: [phone]  Available on MS Teams

## 2021-11-14 NOTE — ED PROVIDER NOTE - CARE PLAN
Principal Discharge DX:	Vaginal bleeding   1 Principal Discharge DX:	Hematuria   Principal Discharge DX:	Acute UTI  Secondary Diagnosis:	Vaginal bleeding

## 2021-11-15 LAB
ANION GAP SERPL CALC-SCNC: 14 MMOL/L — SIGNIFICANT CHANGE UP (ref 5–17)
BUN SERPL-MCNC: 12 MG/DL — SIGNIFICANT CHANGE UP (ref 7–23)
CALCIUM SERPL-MCNC: 9.3 MG/DL — SIGNIFICANT CHANGE UP (ref 8.4–10.5)
CHLORIDE SERPL-SCNC: 105 MMOL/L — SIGNIFICANT CHANGE UP (ref 96–108)
CO2 SERPL-SCNC: 22 MMOL/L — SIGNIFICANT CHANGE UP (ref 22–31)
COVID-19 SPIKE DOMAIN AB INTERP: POSITIVE
COVID-19 SPIKE DOMAIN ANTIBODY RESULT: >250 U/ML — HIGH
CREAT SERPL-MCNC: 0.57 MG/DL — SIGNIFICANT CHANGE UP (ref 0.5–1.3)
GLUCOSE SERPL-MCNC: 85 MG/DL — SIGNIFICANT CHANGE UP (ref 70–99)
HCT VFR BLD CALC: 39 % — SIGNIFICANT CHANGE UP (ref 34.5–45)
HGB BLD-MCNC: 12.6 G/DL — SIGNIFICANT CHANGE UP (ref 11.5–15.5)
MCHC RBC-ENTMCNC: 30.9 PG — SIGNIFICANT CHANGE UP (ref 27–34)
MCHC RBC-ENTMCNC: 32.3 GM/DL — SIGNIFICANT CHANGE UP (ref 32–36)
MCV RBC AUTO: 95.6 FL — SIGNIFICANT CHANGE UP (ref 80–100)
NRBC # BLD: 0 /100 WBCS — SIGNIFICANT CHANGE UP (ref 0–0)
PLATELET # BLD AUTO: 176 K/UL — SIGNIFICANT CHANGE UP (ref 150–400)
POTASSIUM SERPL-MCNC: 4 MMOL/L — SIGNIFICANT CHANGE UP (ref 3.5–5.3)
POTASSIUM SERPL-SCNC: 4 MMOL/L — SIGNIFICANT CHANGE UP (ref 3.5–5.3)
RBC # BLD: 4.08 M/UL — SIGNIFICANT CHANGE UP (ref 3.8–5.2)
RBC # FLD: 13.1 % — SIGNIFICANT CHANGE UP (ref 10.3–14.5)
SARS-COV-2 IGG+IGM SERPL QL IA: >250 U/ML — HIGH
SARS-COV-2 IGG+IGM SERPL QL IA: POSITIVE
SODIUM SERPL-SCNC: 141 MMOL/L — SIGNIFICANT CHANGE UP (ref 135–145)
WBC # BLD: 4.84 K/UL — SIGNIFICANT CHANGE UP (ref 3.8–10.5)
WBC # FLD AUTO: 4.84 K/UL — SIGNIFICANT CHANGE UP (ref 3.8–10.5)

## 2021-11-15 RX ORDER — CEFTRIAXONE 500 MG/1
1000 INJECTION, POWDER, FOR SOLUTION INTRAMUSCULAR; INTRAVENOUS EVERY 24 HOURS
Refills: 0 | Status: COMPLETED | OUTPATIENT
Start: 2021-11-15 | End: 2021-11-18

## 2021-11-15 RX ADMIN — Medication 25 MILLIGRAM(S): at 05:41

## 2021-11-15 RX ADMIN — Medication 20 MILLIGRAM(S): at 05:41

## 2021-11-15 NOTE — PATIENT PROFILE ADULT - FUNCTIONAL SCREEN CURRENT LEVEL: SWALLOWING (IF SCORE 2 OR MORE FOR ANY ITEM, CONSULT REHAB SERVICES), MLM)
H/O arthroscopy of left knee    H/O arthroscopy of right knee    H/O arthroscopy of shoulder 0 = swallows foods/liquids without difficulty

## 2021-11-16 LAB
COVID-19 NUCLEOCAPSID GAM AB INTERP: POSITIVE
COVID-19 NUCLEOCAPSID TOTAL GAM ANTIBODY RESULT: 46 INDEX — HIGH
SARS-COV-2 IGG+IGM SERPL QL IA: 46 INDEX — HIGH
SARS-COV-2 IGG+IGM SERPL QL IA: POSITIVE

## 2021-11-16 PROCEDURE — 76830 TRANSVAGINAL US NON-OB: CPT | Mod: 26

## 2021-11-16 RX ADMIN — CITALOPRAM 10 MILLIGRAM(S): 10 TABLET, FILM COATED ORAL at 12:53

## 2021-11-16 RX ADMIN — CEFTRIAXONE 100 MILLIGRAM(S): 500 INJECTION, POWDER, FOR SOLUTION INTRAMUSCULAR; INTRAVENOUS at 01:08

## 2021-11-16 RX ADMIN — PREGABALIN 1000 MICROGRAM(S): 225 CAPSULE ORAL at 12:53

## 2021-11-16 RX ADMIN — Medication 500 MILLIGRAM(S): at 12:53

## 2021-11-16 RX ADMIN — RISPERIDONE 0.25 MILLIGRAM(S): 4 TABLET ORAL at 10:33

## 2021-11-16 RX ADMIN — RISPERIDONE 0.25 MILLIGRAM(S): 4 TABLET ORAL at 17:02

## 2021-11-16 RX ADMIN — Medication 20 MILLIGRAM(S): at 05:19

## 2021-11-16 RX ADMIN — DONEPEZIL HYDROCHLORIDE 5 MILLIGRAM(S): 10 TABLET, FILM COATED ORAL at 21:55

## 2021-11-16 RX ADMIN — Medication 50 MILLIGRAM(S): at 21:56

## 2021-11-16 RX ADMIN — Medication 1 TABLET(S): at 12:53

## 2021-11-16 RX ADMIN — Medication 25 MILLIGRAM(S): at 05:18

## 2021-11-16 NOTE — DIETITIAN INITIAL EVALUATION ADULT. - REASON INDICATOR FOR ASSESSMENT
Nutrition consult received for pressure ulcer >2.   Information obtained from: medical record, previous RD note, pt, and RN.   Pt confused/disoriented as per documentation. Unable to reach reference contact -?accuracy of information obtained.

## 2021-11-16 NOTE — CONSULT NOTE ADULT - ASSESSMENT
86y female with a PMH significant for advanced Alzheimer's dementia & arthritis s/p left knee & right hip arthroplasty, sent in from [facility] for vaginal bleeding. Vaginal scan & transvaginal US attempted in ER, but patient became agitated and refused, requiring Haldol to calm down.  Has no fevers or leukocytosis.   UA reveals large blood, 419 RBCs, only 3 WBCs, + nitrites & no LE.   Urine cx with Klebsiella.     PLAN:  Continue Ceftriaxone for possible UTI - pt is confused & hx unreliable  Follow sensitivity data on Klebsiella   Thank you will follow

## 2021-11-16 NOTE — DIETITIAN INITIAL EVALUATION ADULT. - REASON FOR ADMISSION
Pt 85 y/o F with PMH as per chart: HTN, Alzheimer's Dementia, sent in from [facility] for vaginal bleeding, CT abdomen/ pelvis pending.

## 2021-11-16 NOTE — DIETITIAN INITIAL EVALUATION ADULT. - ADD RECOMMEND
1. Will continue to monitor PO intake, weight, labs, skin, GI status, diet. 2. Encourage PO intake and provide food preferences. 3. Continue Multivitamin and Vitamin C as medically feasible to optimize nutrient intake and further aid with pressure ulcer healing. 4. Encouraged to continue adequate kcal and protein intake to help with skin healing - made aware RD remains available.

## 2021-11-16 NOTE — DIETITIAN INITIAL EVALUATION ADULT. - OTHER INFO
Confirmed information with RN. Pt reports good appetite and PO intake in house. States "I eat everything". Noted 100% PO intake as per flow sheets and breakfast tray at bedside. Refused nutritional supplements. Denies difficulty chewing/swallowing. Pt denies nausea, vomiting, diarrhea, or constipation, last BM today (11/16).     Pt denies weight changes PTA, unable to recall UBW. Weight as per previous RD note (02/06/2019) 105 pounds with highly questionable accuracy. Weight as per flow sheet (11/14) 129 pounds.     Encouraged to continue adequate kcal and protein intake to help with skin healing. Pt denies having questions/concerns about diet and nutrition - made aware RD remains available.

## 2021-11-16 NOTE — DIETITIAN INITIAL EVALUATION ADULT. - ORAL INTAKE PTA/DIET HISTORY
Pt reports good appetite and PO intake PTA. Confirms NKFA. Pt reports not following any type of diet or restriction PTA. Reports taking Multivitamin PTA; denies drinking any nutritional supplement due to good PO intake. As per H&P, pt taking Vitamin B12 and Vitamin C as well.

## 2021-11-16 NOTE — CONSULT NOTE ADULT - SUBJECTIVE AND OBJECTIVE BOX
HPI:   Patient is a 86y female with a PMH significant for advanced Alzheimer's dementia & arthritis s/p left knee & right hip arthroplasty, sent in from [facility] for vaginal bleeding. The emergency room doctors attempted to perform vaginal scan & transvaginal US, but patient became agitated and refused. She received Haldol twice. Was started on Ceftriaxone - given abnormal UA & ID called.    Patient has no  complaints, unaware of where she is - "[place] am [place]". Patient was told that she was in the hospital, reports that someone looks for trouble, she just feels sleepy but had no complaints. Exam non focal. Has no fevers or leukocytosis. UA reveals large blood, > 400 RBCs, only 3 WBCs, + nitrites & no LE. Urine cx with Klebsiella. ID called.     REVIEW OF SYSTEMS:  All other review of systems negative except that she feels sleepy     PAST MEDICAL & SURGICAL HISTORY:  Alzheimer disease  History of total knee arthroplasty, left  H/O total hip arthroplasty, right      Allergies  No Known Allergies    Antimicrobials Day #  : 2  cefTRIAXone   IVPB 1000 milliGRAM(s) IV Intermittent every 24 hours    Other Medications:  acetaminophen     Tablet .. 650 milliGRAM(s) Oral every 6 hours PRN  ascorbic acid 500 milliGRAM(s) Oral daily  citalopram 10 milliGRAM(s) Oral daily  cyanocobalamin 1000 MICROGram(s) Oral daily  donepezil 5 milliGRAM(s) Oral at bedtime  furosemide    Tablet 20 milliGRAM(s) Oral daily  metoprolol succinate ER 25 milliGRAM(s) Oral daily  multivitamin 1 Tablet(s) Oral daily  risperiDONE   Tablet 0.25 milliGRAM(s) Oral three times a day PRN  senna 1 Tablet(s) Oral at bedtime  traZODone 50 milliGRAM(s) Oral at bedtime      FAMILY HISTORY:  No pertinent family history in first degree relatives        SOCIAL HISTORY:  Smoking: quit smoking    ETOH:  unknown   Drug Use: No    T(F): 98.8 (11-16-21 @ 10:23), Max: 98.8 (11-16-21 @ 10:23)  HR: 81 (11-16-21 @ 10:23)  BP: 138/63 (11-16-21 @ 10:23)  RR: 18 (11-16-21 @ 10:23)  SpO2: 95% (11-16-21 @ 10:23)  Wt(kg): --    PHYSICAL EXAM:  General: alert, no acute distress  Eyes:  anicteric, no conjunctival injection, no discharge  Neck: supple  Lungs: clear to auscultation  Heart: regular rate and rhythm; no murmurs  Abdomen: soft, nondistended, nontender, bowel sounds +  Skin: no lesions  Extremities: no clubbing, cyanosis, or edema  Neurologic: alert, oriented, moves all extremities    LAB RESULTS:                        12.6   4.84  )-----------( 176      ( 15 Nov 2021 07:53 )             39.0     11-15    141  |  105  |  12  ----------------------------<  85  4.0   |  22  |  0.57    Ca    9.3      15 Nov 2021 07:53        MICROBIOLOGY:  RECENT CULTURES:  11-14 @ 12:35 Catheterized    >100,000 CFU/ml Klebsiella pneumoniae      RADIOLOGY REVIEWED:

## 2021-11-16 NOTE — DIETITIAN INITIAL EVALUATION ADULT. - PHYSCIAL ASSESSMENT
well nourished Skin: pressure ulcer in sacrum suspected deep tissue injury as per documentation.  No visual signs of muscle/fat loss noted.

## 2021-11-17 LAB
-  AMIKACIN: SIGNIFICANT CHANGE UP
-  AMOXICILLIN/CLAVULANIC ACID: SIGNIFICANT CHANGE UP
-  AMPICILLIN/SULBACTAM: SIGNIFICANT CHANGE UP
-  AMPICILLIN: SIGNIFICANT CHANGE UP
-  AZTREONAM: SIGNIFICANT CHANGE UP
-  CEFAZOLIN: SIGNIFICANT CHANGE UP
-  CEFEPIME: SIGNIFICANT CHANGE UP
-  CEFOXITIN: SIGNIFICANT CHANGE UP
-  CEFTRIAXONE: SIGNIFICANT CHANGE UP
-  CIPROFLOXACIN: SIGNIFICANT CHANGE UP
-  ERTAPENEM: SIGNIFICANT CHANGE UP
-  GENTAMICIN: SIGNIFICANT CHANGE UP
-  IMIPENEM: SIGNIFICANT CHANGE UP
-  LEVOFLOXACIN: SIGNIFICANT CHANGE UP
-  MEROPENEM: SIGNIFICANT CHANGE UP
-  NITROFURANTOIN: SIGNIFICANT CHANGE UP
-  PIPERACILLIN/TAZOBACTAM: SIGNIFICANT CHANGE UP
-  TIGECYCLINE: SIGNIFICANT CHANGE UP
-  TOBRAMYCIN: SIGNIFICANT CHANGE UP
-  TRIMETHOPRIM/SULFAMETHOXAZOLE: SIGNIFICANT CHANGE UP
ANION GAP SERPL CALC-SCNC: 14 MMOL/L — SIGNIFICANT CHANGE UP (ref 5–17)
BUN SERPL-MCNC: 27 MG/DL — HIGH (ref 7–23)
CALCIUM SERPL-MCNC: 9.2 MG/DL — SIGNIFICANT CHANGE UP (ref 8.4–10.5)
CHLORIDE SERPL-SCNC: 106 MMOL/L — SIGNIFICANT CHANGE UP (ref 96–108)
CO2 SERPL-SCNC: 22 MMOL/L — SIGNIFICANT CHANGE UP (ref 22–31)
CREAT SERPL-MCNC: 0.79 MG/DL — SIGNIFICANT CHANGE UP (ref 0.5–1.3)
CULTURE RESULTS: SIGNIFICANT CHANGE UP
GLUCOSE SERPL-MCNC: 112 MG/DL — HIGH (ref 70–99)
HCT VFR BLD CALC: 41.4 % — SIGNIFICANT CHANGE UP (ref 34.5–45)
HGB BLD-MCNC: 13.7 G/DL — SIGNIFICANT CHANGE UP (ref 11.5–15.5)
MCHC RBC-ENTMCNC: 30.6 PG — SIGNIFICANT CHANGE UP (ref 27–34)
MCHC RBC-ENTMCNC: 33.1 GM/DL — SIGNIFICANT CHANGE UP (ref 32–36)
MCV RBC AUTO: 92.4 FL — SIGNIFICANT CHANGE UP (ref 80–100)
METHOD TYPE: SIGNIFICANT CHANGE UP
NRBC # BLD: 0 /100 WBCS — SIGNIFICANT CHANGE UP (ref 0–0)
ORGANISM # SPEC MICROSCOPIC CNT: SIGNIFICANT CHANGE UP
ORGANISM # SPEC MICROSCOPIC CNT: SIGNIFICANT CHANGE UP
PLATELET # BLD AUTO: 213 K/UL — SIGNIFICANT CHANGE UP (ref 150–400)
POTASSIUM SERPL-MCNC: 3.7 MMOL/L — SIGNIFICANT CHANGE UP (ref 3.5–5.3)
POTASSIUM SERPL-SCNC: 3.7 MMOL/L — SIGNIFICANT CHANGE UP (ref 3.5–5.3)
RBC # BLD: 4.48 M/UL — SIGNIFICANT CHANGE UP (ref 3.8–5.2)
RBC # FLD: 13.1 % — SIGNIFICANT CHANGE UP (ref 10.3–14.5)
SODIUM SERPL-SCNC: 142 MMOL/L — SIGNIFICANT CHANGE UP (ref 135–145)
SPECIMEN SOURCE: SIGNIFICANT CHANGE UP
WBC # BLD: 7.74 K/UL — SIGNIFICANT CHANGE UP (ref 3.8–10.5)
WBC # FLD AUTO: 7.74 K/UL — SIGNIFICANT CHANGE UP (ref 3.8–10.5)

## 2021-11-17 PROCEDURE — 93010 ELECTROCARDIOGRAM REPORT: CPT

## 2021-11-17 RX ADMIN — DONEPEZIL HYDROCHLORIDE 5 MILLIGRAM(S): 10 TABLET, FILM COATED ORAL at 21:35

## 2021-11-17 RX ADMIN — CEFTRIAXONE 100 MILLIGRAM(S): 500 INJECTION, POWDER, FOR SOLUTION INTRAMUSCULAR; INTRAVENOUS at 01:06

## 2021-11-17 RX ADMIN — Medication 1 TABLET(S): at 11:52

## 2021-11-17 RX ADMIN — CITALOPRAM 10 MILLIGRAM(S): 10 TABLET, FILM COATED ORAL at 11:51

## 2021-11-17 RX ADMIN — RISPERIDONE 0.25 MILLIGRAM(S): 4 TABLET ORAL at 10:01

## 2021-11-17 RX ADMIN — SENNA PLUS 1 TABLET(S): 8.6 TABLET ORAL at 21:35

## 2021-11-17 RX ADMIN — Medication 650 MILLIGRAM(S): at 10:01

## 2021-11-17 RX ADMIN — Medication 50 MILLIGRAM(S): at 21:35

## 2021-11-17 RX ADMIN — Medication 25 MILLIGRAM(S): at 06:06

## 2021-11-17 RX ADMIN — Medication 650 MILLIGRAM(S): at 11:00

## 2021-11-17 RX ADMIN — Medication 500 MILLIGRAM(S): at 11:52

## 2021-11-17 RX ADMIN — Medication 20 MILLIGRAM(S): at 06:07

## 2021-11-17 RX ADMIN — PREGABALIN 1000 MICROGRAM(S): 225 CAPSULE ORAL at 11:52

## 2021-11-17 NOTE — CONSULT NOTE ADULT - ASSESSMENT
85 yo F w/ Alzheimer's disease and HTN presented to hospital from assisted living facility for vaginal bleeding for prolonged period.   TVUS shows trace fluid in the endometrial cavity and an intracavitary lesion w/o definitive vascularity likely polyp.  Patient empiricially started on Ceftriaxone for dirty urinalysis with final urine culture resulting as Klebsiella. 87 yo F w/ Alzheimer's disease and HTN presented to hospital from assisted living facility for vaginal bleeding for prolonged period.   TVUS shows trace fluid in the endometrial cavity and an intracavitary lesion w/o definitive vascularity likely polyp.  Patient empiricially started on Ceftriaxone for dirty urinalysis with final urine culture resulting as Klebsiella.      -Please have goals of care conversation w/ patient's healthcare proxy to determine if they are consenting to pelvic examination and if they desire further intervention for endometrial biopsy  -Gyn exam and biopsy deferred at this time as patient is not consentable, gyn available to perform exam and biopsy if in alignment with health care goals    d/w Dr. Cesia Coronado PGY3

## 2021-11-17 NOTE — PHYSICAL THERAPY INITIAL EVALUATION ADULT - PRECAUTIONS/LIMITATIONS, REHAB EVAL
Dx:	Vaginal bleed- US transvaginal , H/H stable, 11/16: Vaginal US shows polyp, calcified fibroid./fall precautions

## 2021-11-17 NOTE — PHYSICAL THERAPY INITIAL EVALUATION ADULT - PERTINENT HX OF CURRENT PROBLEM, REHAB EVAL
86F with PMHx of HTN and Alzheimer's Dementia sent in from [facility] for vaginal bleeding. apparently has been going on for several weeks. Latex:  Patient denies allergy to latex.  Medications reviewed with patient.  Tobacco use verified.  Allergies verified.    CHIEF COMPLAINT:   Chief Complaint   Patient presents with   • Follow-up     ACDF C5-6 DOS 5/13/16  room 18       Pt reports that since last visit she is still having trouble sleeping in her bed.  She still gets headaches off and on.  She has noticed that when she is holding her phone or tablet in her hands in front of her, her arms and hands gets a numb feeling and she has to shake them to get it to go away.    Physical Therapy?  no  Current pain medication regiment:  no  Refills needed?:   no  Schedule given?:  no  Do you have any forms/documentation for MD to complete?: no    If yes, is consent form completed? n/a      PMD - Amparo Miranda MD    Current Outpatient Prescriptions   Medication Sig Dispense Refill   • carvedilol (COREG) 25 MG tablet Take 1 tablet by mouth 2 times daily (with meals). 60 tablet 11   • metformin (GLUCOPHAGE) 1000 MG tablet Take 1 tablet by mouth daily (with breakfast). 180 tablet 1   • simvastatin (ZOCOR) 20 MG tablet Take 1 tablet by mouth nightly. 90 tablet 1   • glimepiride (AMARYL) 1 MG tablet Take 1 tablet by mouth daily (before breakfast). 90 tablet 1   • amLODIPine (NORVASC) 10 MG tablet Take 1 tablet by mouth daily. 30 tablet 3   • lisinopril (PRINIVIL,ZESTRIL) 40 MG tablet Take 1 tablet by mouth daily. 90 tablet 3   • cyclobenzaprine (FLEXERIL) 10 MG tablet Take 1 tablet by mouth nightly as needed for Muscle spasms. 90 tablet 1   • aspirin 81 MG tablet Take 81 mg by mouth daily.     • gabapentin (NEURONTIN) 300 MG capsule Take at hs (Patient taking differently: Take 300 mg by mouth nightly. ) 30 capsule 1   • HYDROcodone-acetaminophen (NORCO) 5-325 MG per tablet Take 1-2 tablets every 6 hours as needed for pain. 60 tablet 0   • sertraline (ZOLOFT) 100 MG tablet TAKE ONE-HALF (1/2) TABLET DAILY 45 tablet 3   • oxyCODONE, IMM REL, (ROXICODONE) 5 MG  immediate release tablet Take 1 tablet by mouth every 4 hours as needed for Pain. 90 tablet 0   • silver sulfADIAZINE (SILVADENE) 1 % cream Apply to burns tid (Patient taking differently: Apply 1 application topically daily as needed. ) 120 g 1   • meclizine HCl (DRAMAMINE) 25 MG tablet Take 1 tablet by mouth 3 times daily as needed for Dizziness. 30 tablet 1   • Cholecalciferol (VITAMIN D) 1000 UNITS capsule Take 5,000 Units by mouth daily.     • augmented betamethasone dipropionate (DIPROLENE) 0.05 % ointment Apply very thin layer to hands twice daily only when needed for flares 30 g 0   • albuterol 108 (90 BASE) MCG/ACT inhaler Inhale 2 puffs into the lungs every 4 hours as needed for Shortness of Breath or Wheezing. 1 Inhaler 0   • vitamin E 1000 UNIT capsule Take 1 capsule by mouth daily.     • budesonide/formoterol (SYMBICORT) 80-4.5 MCG/ACT inhaler Inhale 2 puffs into the lungs 2 times daily. Be sure to rinse/gargle your mouth and throat thoroughly after using the inhaler 1 Inhaler 12     No current facility-administered medications for this visit.

## 2021-11-17 NOTE — CONSULT NOTE ADULT - ASSESSMENT
Impression:    Sacral/bilateral Buttocks deep tissue injury present on admission  Incontinence of bowel and bladder  Incontinence Dermatitis    Recommend:  1.) topical therapy: Sacral/bilateral buttocks injury - cleanse with incontinence cleanser, pat dry, apply cavilon advanced to bilateral buttocks daily  2.) Incontinence Management - incontinence cleanser, pads, pericare BID  3.) Maintain on an alternating air with low air loss surface  4.) Turn and reposition Q 2 hours  5.) Nutrition optimization  6.) Offload heels/feet with complete cair air fluidized boots; ensure that the soles of the feet are not resting on the foot board of the bed.    Care as per medicine. Will not actively follow but will remain available. Please recall for new issues or deterioration.  Upon [facility] [address] [place] [address] [phone]  Seen and discussed with clinical nurse  Thank you for this consult  Anamika Manning, NP-C, CWOCN [id number]

## 2021-11-17 NOTE — CONSULT NOTE ADULT - SUBJECTIVE AND OBJECTIVE BOX
CARL MAR  86y  Female [id number]    HPI:  85yo F w/ PMHx of HTN, Alzheimer's dementia sent to ED from assisted living facility for vaginal bleeding that has been ongoing for several weeks.  In ED attempt was made to perform vaginal exam but patient was agitated and refusing.          Name of GYN Physician:     POB:      Pgyn:     Home meds:     Hospital Meds:   MEDICATIONS  (STANDING):  ascorbic acid 500 milliGRAM(s) Oral daily  cefTRIAXone   IVPB 1000 milliGRAM(s) IV Intermittent every 24 hours  citalopram 10 milliGRAM(s) Oral daily  cyanocobalamin 1000 MICROGram(s) Oral daily  donepezil 5 milliGRAM(s) Oral at bedtime  furosemide    Tablet 20 milliGRAM(s) Oral daily  metoprolol succinate ER 25 milliGRAM(s) Oral daily  multivitamin 1 Tablet(s) Oral daily  senna 1 Tablet(s) Oral at bedtime  traZODone 50 milliGRAM(s) Oral at bedtime    MEDICATIONS  (PRN):  acetaminophen     Tablet .. 650 milliGRAM(s) Oral every 6 hours PRN Temp greater or equal to 38C (100.4F), Mild Pain (1 - 3), Moderate Pain (4 - 6), Severe Pain (7 - 10)  risperiDONE   Tablet 0.25 milliGRAM(s) Oral three times a day PRN Agitation    Allergies    No Known Allergies    Intolerances    PAST MEDICAL & SURGICAL HISTORY:  Alzheimer disease    History of total knee arthroplasty, left    H/O total hip arthroplasty, right    FAMILY HISTORY:  No pertinent family history in first degree relatives    Social History:      Vital Signs Last 24 Hrs  T(C): 37.3 (17 Nov 2021 06:01), Max: 37.4 (16 Nov 2021 21:10)  T(F): 99.2 (17 Nov 2021 06:01), Max: 99.3 (16 Nov 2021 21:10)  HR: 97 (17 Nov 2021 06:01) (69 - 97)  BP: 116/78 (17 Nov 2021 06:01) (116/78 - 150/68)  BP(mean): --  RR: 18 (17 Nov 2021 06:01) (18 - 18)  SpO2: 93% (17 Nov 2021 06:01) (93% - 97%)    Physical Exam:   General: sitting comftorably in bed, NAD   HEENT: neck supple, full ROM  CV: RR S1S2 no m/r/g  Lungs: CTA b/l, good air flow b/l   Abd: Soft, non-tender, non-distended.  Bowel sounds present.    :  Speculum Exam:   Ext: non-tender b/l, no edema     LABS:                        13.7   7.74  )-----------( 213      ( 17 Nov 2021 06:33 )             41.4     11-15    141  |  105  |  12  ----------------------------<  85  4.0   |  22  |  0.57    Ca    9.3      15 Nov 2021 07:53      I&O's Detail    16 Nov 2021 07:01  -  17 Nov 2021 07:00  --------------------------------------------------------  IN:    Oral Fluid: 240 mL  Total IN: 240 mL    OUT:    Voided (mL): 350 mL  Total OUT: 350 mL    Total NET: -110 mL    RADIOLOGY & ADDITIONAL STUDIES:    EXAM:  US TRANSVAGINAL                          PROCEDURE DATE:  11/16/2021      INTERPRETATION:  CLINICAL INFORMATION: Vaginal bleeding. Evaluate endometrium. Concern for malignancy.    COMPARISON: CT pelvis 2/1/2019.    TECHNIQUE:  Endovaginal pelvic sonogram as per order. Transabdominal pelvic sonogram was also performed as part of our standard protocol.    FINDINGS:    Uterus: 6.3 cm x 3.7 cm x 4.6 cm. Retroverted. A calcified fundal fibroid measures 2.7 x 2.6 x 3.0 cm.  Endometrium: Trace fluid in the lower uterine segment with an 1.7 x 0.5 x 0.7 cm echogenic intracavitary lesion without definite vascularity. The midchest right measures 3 mm at the level of the body/fundus.    The ovaries are not visualized.    Fluid: None.    IMPRESSION:  Endometrium mildly distended with fluid and an 1.7 cm avascular echogenic focus at the lower uterine segment, possibly a polyp or blood products given history of vaginal bleeding. Correlate with tissue sampling or sonohysterogram.    Calcified uterine fibroid.    THAO GARCIA MD; Fellow Radiology  This document has been electronically signed.  PRABHA GTZ MD; Attending Radiologist  This document has been electronically signed. Nov 16 2021 12:39PM CARL MAR  86y  Female [id number]    HPI:  87yo F w/ PMHx of HTN, Alzheimer's dementia sent to ED from assisted living facility for vaginal bleeding that has been ongoing for several weeks.  In ED attempt was made to perform vaginal exam but patient was agitated and refusing.      On bedside evaluation, patient A&O x0.  Patient is unaware of reason for hospitalization.  When asked about vaginal bleeding states that she is unaware that was occurring.  Full past medical and surgical history could not be elicited due to patient's impairments.    Name of GYN Physician: N/A    POB:      Pgyn:     Home meds:     Hospital Meds:   MEDICATIONS  (STANDING):  ascorbic acid 500 milliGRAM(s) Oral daily  cefTRIAXone   IVPB 1000 milliGRAM(s) IV Intermittent every 24 hours  citalopram 10 milliGRAM(s) Oral daily  cyanocobalamin 1000 MICROGram(s) Oral daily  donepezil 5 milliGRAM(s) Oral at bedtime  furosemide    Tablet 20 milliGRAM(s) Oral daily  metoprolol succinate ER 25 milliGRAM(s) Oral daily  multivitamin 1 Tablet(s) Oral daily  senna 1 Tablet(s) Oral at bedtime  traZODone 50 milliGRAM(s) Oral at bedtime    MEDICATIONS  (PRN):  acetaminophen     Tablet .. 650 milliGRAM(s) Oral every 6 hours PRN Temp greater or equal to 38C (100.4F), Mild Pain (1 - 3), Moderate Pain (4 - 6), Severe Pain (7 - 10)  risperiDONE   Tablet 0.25 milliGRAM(s) Oral three times a day PRN Agitation    Allergies    No Known Allergies    Intolerances    PAST MEDICAL & SURGICAL HISTORY:  Alzheimer disease    History of total knee arthroplasty, left    H/O total hip arthroplasty, right    FAMILY HISTORY:  No pertinent family history in first degree relatives    Social History:      Vital Signs Last 24 Hrs  T(C): 37.3 (17 Nov 2021 06:01), Max: 37.4 (16 Nov 2021 21:10)  T(F): 99.2 (17 Nov 2021 06:01), Max: 99.3 (16 Nov 2021 21:10)  HR: 97 (17 Nov 2021 06:01) (69 - 97)  BP: 116/78 (17 Nov 2021 06:01) (116/78 - 150/68)  BP(mean): --  RR: 18 (17 Nov 2021 06:01) (18 - 18)  SpO2: 93% (17 Nov 2021 06:01) (93% - 97%)    Physical Exam:   General: asleep in bed, NAD   HEENT: neck supple, full ROM  CV: RR S1S2 no m/r/g  Lungs: nonlabored breathing on room air  Abd: Soft, non-tender, non-distended.    :  Speculum Exam:   Ext: non-tender b/l, no edema     LABS:                        13.7   7.74  )-----------( 213      ( 17 Nov 2021 06:33 )             41.4     11-15    141  |  105  |  12  ----------------------------<  85  4.0   |  22  |  0.57    Ca    9.3      15 Nov 2021 07:53      I&O's Detail    16 Nov 2021 07:01  -  17 Nov 2021 07:00  --------------------------------------------------------  IN:    Oral Fluid: 240 mL  Total IN: 240 mL    OUT:    Voided (mL): 350 mL  Total OUT: 350 mL    Total NET: -110 mL    RADIOLOGY & ADDITIONAL STUDIES:    EXAM:  US TRANSVAGINAL                          PROCEDURE DATE:  11/16/2021      INTERPRETATION:  CLINICAL INFORMATION: Vaginal bleeding. Evaluate endometrium. Concern for malignancy.    COMPARISON: CT pelvis 2/1/2019.    TECHNIQUE:  Endovaginal pelvic sonogram as per order. Transabdominal pelvic sonogram was also performed as part of our standard protocol.    FINDINGS:    Uterus: 6.3 cm x 3.7 cm x 4.6 cm. Retroverted. A calcified fundal fibroid measures 2.7 x 2.6 x 3.0 cm.  Endometrium: Trace fluid in the lower uterine segment with an 1.7 x 0.5 x 0.7 cm echogenic intracavitary lesion without definite vascularity. The midchest right measures 3 mm at the level of the body/fundus.    The ovaries are not visualized.    Fluid: None.    IMPRESSION:  Endometrium mildly distended with fluid and an 1.7 cm avascular echogenic focus at the lower uterine segment, possibly a polyp or blood products given history of vaginal bleeding. Correlate with tissue sampling or sonohysterogram.    Calcified uterine fibroid.    THAO GARCIA MD; Fellow Radiology  This document has been electronically signed.  PRABHA GTZ MD; Attending Radiologist  This document has been electronically signed. Nov 16 2021 12:39PM CARL MAR  86y  Female [id number]    HPI:  87yo F w/ PMHx of HTN, Alzheimer's dementia sent to ED from assisted living facility for vaginal bleeding that has been ongoing for several weeks.  In ED attempt was made to perform vaginal exam but patient was agitated and refusing.      On bedside evaluation, patient A&O x0.  Patient is unaware of reason for hospitalization.  When asked about vaginal bleeding states that she is unaware that was occurring.  Full past medical and surgical history could not be elicited due to patient's impairments.    Name of GYN Physician: N/A    Hospital Meds:   MEDICATIONS  (STANDING):  ascorbic acid 500 milliGRAM(s) Oral daily  cefTRIAXone   IVPB 1000 milliGRAM(s) IV Intermittent every 24 hours  citalopram 10 milliGRAM(s) Oral daily  cyanocobalamin 1000 MICROGram(s) Oral daily  donepezil 5 milliGRAM(s) Oral at bedtime  furosemide    Tablet 20 milliGRAM(s) Oral daily  metoprolol succinate ER 25 milliGRAM(s) Oral daily  multivitamin 1 Tablet(s) Oral daily  senna 1 Tablet(s) Oral at bedtime  traZODone 50 milliGRAM(s) Oral at bedtime    MEDICATIONS  (PRN):  acetaminophen     Tablet .. 650 milliGRAM(s) Oral every 6 hours PRN Temp greater or equal to 38C (100.4F), Mild Pain (1 - 3), Moderate Pain (4 - 6), Severe Pain (7 - 10)  risperiDONE   Tablet 0.25 milliGRAM(s) Oral three times a day PRN Agitation    Allergies    No Known Allergies    Intolerances    PAST MEDICAL & SURGICAL HISTORY:  Alzheimer disease    History of total knee arthroplasty, left    H/O total hip arthroplasty, right    FAMILY HISTORY:  No pertinent family history in first degree relatives    Social History:      Vital Signs Last 24 Hrs  T(C): 37.3 (17 Nov 2021 06:01), Max: 37.4 (16 Nov 2021 21:10)  T(F): 99.2 (17 Nov 2021 06:01), Max: 99.3 (16 Nov 2021 21:10)  HR: 97 (17 Nov 2021 06:01) (69 - 97)  BP: 116/78 (17 Nov 2021 06:01) (116/78 - 150/68)  BP(mean): --  RR: 18 (17 Nov 2021 06:01) (18 - 18)  SpO2: 93% (17 Nov 2021 06:01) (93% - 97%)    Physical Exam:   General: asleep in bed, NAD   HEENT: neck supple, full ROM  CV: RR S1S2 no m/r/g  Lungs: nonlabored breathing on room air  Abd: Soft, non-tender, non-distended.    Ext: non-tender b/l, no edema     LABS:                        13.7   7.74  )-----------( 213      ( 17 Nov 2021 06:33 )             41.4     11-15    141  |  105  |  12  ----------------------------<  85  4.0   |  22  |  0.57    Ca    9.3      15 Nov 2021 07:53      I&O's Detail    16 Nov 2021 07:01  -  17 Nov 2021 07:00  --------------------------------------------------------  IN:    Oral Fluid: 240 mL  Total IN: 240 mL    OUT:    Voided (mL): 350 mL  Total OUT: 350 mL    Total NET: -110 mL    RADIOLOGY & ADDITIONAL STUDIES:    EXAM:  US TRANSVAGINAL                          PROCEDURE DATE:  11/16/2021      INTERPRETATION:  CLINICAL INFORMATION: Vaginal bleeding. Evaluate endometrium. Concern for malignancy.    COMPARISON: CT pelvis 2/1/2019.    TECHNIQUE:  Endovaginal pelvic sonogram as per order. Transabdominal pelvic sonogram was also performed as part of our standard protocol.    FINDINGS:    Uterus: 6.3 cm x 3.7 cm x 4.6 cm. Retroverted. A calcified fundal fibroid measures 2.7 x 2.6 x 3.0 cm.  Endometrium: Trace fluid in the lower uterine segment with an 1.7 x 0.5 x 0.7 cm echogenic intracavitary lesion without definite vascularity. The midchest right measures 3 mm at the level of the body/fundus.    The ovaries are not visualized.    Fluid: None.    IMPRESSION:  Endometrium mildly distended with fluid and an 1.7 cm avascular echogenic focus at the lower uterine segment, possibly a polyp or blood products given history of vaginal bleeding. Correlate with tissue sampling or sonohysterogram.    Calcified uterine fibroid.    THAO GARCIA MD; Fellow Radiology  This document has been electronically signed.  PRABHA GTZ MD; Attending Radiologist  This document has been electronically signed. Nov 16 2021 12:39PM

## 2021-11-17 NOTE — CONSULT NOTE ADULT - SUBJECTIVE AND OBJECTIVE BOX
Wound Surgery Consult Note:    HPI:  86F with PMHx of HTN and Alzheimer's Dementia sent in from [facility] for vaginal bleeding. It apparently has been going on for several weeks. Patient with severe dementia and not able to endorse why she is here. The emergency room doctors attempted to perform vaginal scan, but patient agitated and refused. They also attempted to perform transvaginal US, but again patient was agitated and refused. She received Haldol 2 mg twice and is now more calm. Gynecology was called and stated if desired she would need to be sedated because these exams are required for working up post-menopausal vaginal US. Patient herself has no complaints.  (14 Nov 2021 15:31)    Request for wound care consult for sacral/bilateral buttocks skin breakdown received from nursing. Ms. Smith was encountered on an alternating air with low air loss surface. She is grossly incontinent of stool and urine. She was unable turn and reposition self in bed. Her extreme immobility, inactivity, gross incontinence of urine and stool as well as poor nutritional status all contribute to her high risk for pressure injury development and hinder healing. Identification of the initial signs of deep tissue damage at the level of the skin within 72 hours of admission make this an injury that occurred prior to admission.    PAST MEDICAL & SURGICAL HISTORY:  Alzheimer disease  History of total knee arthroplasty, left  H/O total hip arthroplasty, right    MEDICATIONS  (STANDING):  ascorbic acid 500 milliGRAM(s) Oral daily  cefTRIAXone   IVPB 1000 milliGRAM(s) IV Intermittent every 24 hours  citalopram 10 milliGRAM(s) Oral daily  cyanocobalamin 1000 MICROGram(s) Oral daily  donepezil 5 milliGRAM(s) Oral at bedtime  furosemide    Tablet 20 milliGRAM(s) Oral daily  metoprolol succinate ER 25 milliGRAM(s) Oral daily  multivitamin 1 Tablet(s) Oral daily  senna 1 Tablet(s) Oral at bedtime  traZODone 50 milliGRAM(s) Oral at bedtime    MEDICATIONS  (PRN):  acetaminophen     Tablet .. 650 milliGRAM(s) Oral every 6 hours PRN Temp greater or equal to 38C (100.4F), Mild Pain (1 - 3), Moderate Pain (4 - 6), Severe Pain (7 - 10)  risperiDONE   Tablet 0.25 milliGRAM(s) Oral three times a day PRN Agitation    Allergies    No Known Allergies    Intolerances    Vital Signs Last 24 Hrs  T(C): 37.4 (17 Nov 2021 10:10), Max: 37.4 (16 Nov 2021 21:10)  T(F): 99.4 (17 Nov 2021 10:10), Max: 99.4 (17 Nov 2021 10:10)  HR: 115 (17 Nov 2021 10:10) (69 - 115)  BP: 128/78 (17 Nov 2021 10:10) (116/78 - 150/68)  BP(mean): --  RR: 18 (17 Nov 2021 10:10) (18 - 18)  SpO2: 93% (17 Nov 2021 10:10) (93% - 97%)    Physical Exam:  General: Confused, thin, frail  Respiratory: no SOB on room air  Gastrointestinal: soft NT/ND  Neurology: non verbal, not following commands  Musculoskeletal: no contractures  Vascular: no BLE edema  Skin:  Sacral/bilateral buttocks with central area of dusky, deep maroon discolored intact skin L 6cm X W 4cm xD none, no drainage  No erythema, odor, increased warmth, tenderness, induration, fluctuance    LABS:  11-17    142  |  106  |  27<H>  ----------------------------<  112<H>  3.7   |  22  |  0.79    Ca    9.2      17 Nov 2021 06:33                            13.7   7.74  )-----------( 213      ( 17 Nov 2021 06:33 )             41.4

## 2021-11-17 NOTE — PHYSICAL THERAPY INITIAL EVALUATION ADULT - ADDITIONAL COMMENTS
Pt confused, unable to give detailed Prior level of function: as per care coordination notes:. Pt was living at [facility]. and was ambulatory with walker. Pt required assist with ADL's provided by facility.

## 2021-11-18 PROCEDURE — 74177 CT ABD & PELVIS W/CONTRAST: CPT | Mod: 26

## 2021-11-18 RX ADMIN — CEFTRIAXONE 100 MILLIGRAM(S): 500 INJECTION, POWDER, FOR SOLUTION INTRAMUSCULAR; INTRAVENOUS at 00:22

## 2021-11-18 RX ADMIN — Medication 1 TABLET(S): at 12:09

## 2021-11-18 RX ADMIN — Medication 25 MILLIGRAM(S): at 04:58

## 2021-11-18 RX ADMIN — Medication 20 MILLIGRAM(S): at 04:58

## 2021-11-18 RX ADMIN — PREGABALIN 1000 MICROGRAM(S): 225 CAPSULE ORAL at 12:09

## 2021-11-18 RX ADMIN — Medication 500 MILLIGRAM(S): at 12:09

## 2021-11-18 RX ADMIN — CITALOPRAM 10 MILLIGRAM(S): 10 TABLET, FILM COATED ORAL at 12:09

## 2021-11-18 RX ADMIN — SENNA PLUS 1 TABLET(S): 8.6 TABLET ORAL at 22:16

## 2021-11-18 RX ADMIN — DONEPEZIL HYDROCHLORIDE 5 MILLIGRAM(S): 10 TABLET, FILM COATED ORAL at 22:16

## 2021-11-18 RX ADMIN — Medication 50 MILLIGRAM(S): at 22:15

## 2021-11-18 NOTE — PROGRESS NOTE ADULT - CONVERSATION DETAILS
d/w son HCP Wants mother to be comfortable, no invasive procedures, surgery  or chemoRx. Will sign MOLST and DNR

## 2021-11-19 LAB
ANION GAP SERPL CALC-SCNC: 12 MMOL/L — SIGNIFICANT CHANGE UP (ref 5–17)
APPEARANCE UR: ABNORMAL
BILIRUB UR-MCNC: NEGATIVE — SIGNIFICANT CHANGE UP
BUN SERPL-MCNC: 22 MG/DL — SIGNIFICANT CHANGE UP (ref 7–23)
CALCIUM SERPL-MCNC: 8.9 MG/DL — SIGNIFICANT CHANGE UP (ref 8.4–10.5)
CHLORIDE SERPL-SCNC: 104 MMOL/L — SIGNIFICANT CHANGE UP (ref 96–108)
CO2 SERPL-SCNC: 23 MMOL/L — SIGNIFICANT CHANGE UP (ref 22–31)
COLOR SPEC: YELLOW — SIGNIFICANT CHANGE UP
CREAT SERPL-MCNC: 0.66 MG/DL — SIGNIFICANT CHANGE UP (ref 0.5–1.3)
DIFF PNL FLD: NEGATIVE — SIGNIFICANT CHANGE UP
GLUCOSE SERPL-MCNC: 108 MG/DL — HIGH (ref 70–99)
GLUCOSE UR QL: NEGATIVE — SIGNIFICANT CHANGE UP
HCT VFR BLD CALC: 42.1 % — SIGNIFICANT CHANGE UP (ref 34.5–45)
HGB BLD-MCNC: 13.6 G/DL — SIGNIFICANT CHANGE UP (ref 11.5–15.5)
KETONES UR-MCNC: NEGATIVE — SIGNIFICANT CHANGE UP
LEUKOCYTE ESTERASE UR-ACNC: NEGATIVE — SIGNIFICANT CHANGE UP
MAGNESIUM SERPL-MCNC: 2.2 MG/DL — SIGNIFICANT CHANGE UP (ref 1.6–2.6)
MCHC RBC-ENTMCNC: 30.3 PG — SIGNIFICANT CHANGE UP (ref 27–34)
MCHC RBC-ENTMCNC: 32.3 GM/DL — SIGNIFICANT CHANGE UP (ref 32–36)
MCV RBC AUTO: 93.8 FL — SIGNIFICANT CHANGE UP (ref 80–100)
NITRITE UR-MCNC: NEGATIVE — SIGNIFICANT CHANGE UP
NRBC # BLD: 0 /100 WBCS — SIGNIFICANT CHANGE UP (ref 0–0)
PH UR: 6 — SIGNIFICANT CHANGE UP (ref 5–8)
PLATELET # BLD AUTO: 215 K/UL — SIGNIFICANT CHANGE UP (ref 150–400)
POTASSIUM SERPL-MCNC: 3.6 MMOL/L — SIGNIFICANT CHANGE UP (ref 3.5–5.3)
POTASSIUM SERPL-SCNC: 3.6 MMOL/L — SIGNIFICANT CHANGE UP (ref 3.5–5.3)
PROT UR-MCNC: ABNORMAL
RBC # BLD: 4.49 M/UL — SIGNIFICANT CHANGE UP (ref 3.8–5.2)
RBC # FLD: 13.2 % — SIGNIFICANT CHANGE UP (ref 10.3–14.5)
SODIUM SERPL-SCNC: 139 MMOL/L — SIGNIFICANT CHANGE UP (ref 135–145)
SP GR SPEC: 1.04 — HIGH (ref 1.01–1.02)
UROBILINOGEN FLD QL: NEGATIVE — SIGNIFICANT CHANGE UP
WBC # BLD: 8.4 K/UL — SIGNIFICANT CHANGE UP (ref 3.8–10.5)
WBC # FLD AUTO: 8.4 K/UL — SIGNIFICANT CHANGE UP (ref 3.8–10.5)

## 2021-11-19 PROCEDURE — 99231 SBSQ HOSP IP/OBS SF/LOW 25: CPT

## 2021-11-19 RX ORDER — CEFTRIAXONE 500 MG/1
1000 INJECTION, POWDER, FOR SOLUTION INTRAMUSCULAR; INTRAVENOUS EVERY 24 HOURS
Refills: 0 | Status: DISCONTINUED | OUTPATIENT
Start: 2021-11-19 | End: 2021-11-22

## 2021-11-19 RX ADMIN — Medication 50 MILLIGRAM(S): at 21:03

## 2021-11-19 RX ADMIN — DONEPEZIL HYDROCHLORIDE 5 MILLIGRAM(S): 10 TABLET, FILM COATED ORAL at 21:03

## 2021-11-19 RX ADMIN — CITALOPRAM 10 MILLIGRAM(S): 10 TABLET, FILM COATED ORAL at 12:59

## 2021-11-19 RX ADMIN — Medication 25 MILLIGRAM(S): at 05:07

## 2021-11-19 RX ADMIN — Medication 20 MILLIGRAM(S): at 05:07

## 2021-11-19 RX ADMIN — Medication 650 MILLIGRAM(S): at 05:25

## 2021-11-19 RX ADMIN — SENNA PLUS 1 TABLET(S): 8.6 TABLET ORAL at 21:03

## 2021-11-19 RX ADMIN — Medication 1 TABLET(S): at 12:58

## 2021-11-19 RX ADMIN — Medication 500 MILLIGRAM(S): at 12:58

## 2021-11-19 RX ADMIN — PREGABALIN 1000 MICROGRAM(S): 225 CAPSULE ORAL at 12:58

## 2021-11-19 RX ADMIN — CEFTRIAXONE 100 MILLIGRAM(S): 500 INJECTION, POWDER, FOR SOLUTION INTRAMUSCULAR; INTRAVENOUS at 07:39

## 2021-11-19 NOTE — CHART NOTE - NSCHARTNOTEFT_GEN_A_CORE
Palliative consult requested for GOC. Discussed with primary team ACP who stated that son at bedside requesting conservative management. ACP to address MOLST with son. If family interested in hospice care recommend sending referral to hospice agency. If further assistance with goals of care needed please see below:    A Geriatrics and Palliative Medicine Consult was placed for goals of care (GOC). However, after an extensive review of the chart was conducted, no documentation was found for an attempt to address goals of care by the primary medical team or team requesting consultation. No intractable symptoms were identifed from chart review or upon discussion with the treating team. We will advise the primary consulting team to try and address GOC independently at this time.    If the patient has medical decision making capacity, then conversation should be held with the patient first. If capacity is lacking, then conversation should occur with patients Healthcare Proxy "HCP" (documentation confirmed and available in chart) or the patients surrogate decision maker (as per the Family Healthcare Decisions Act [FHCDA]). Hierarchy based on FHCDA [[facility] Article 81: Guardian --> Spouse  or Domestic Partner --> Adult Child --> Parent --> Sibling --> Close Friend].    Recommendations:  Document a GOC discussion by using the document: "Goals of Care Conversation" or documenting "Advance Care Planning" within a note or by selecting "Modify Template" (from within a note) and adding "Goals of Care" section.    When documenting goals of care, please include the following:  Who participated in the discussion?  What was discussed?  What was the outcome of the conversation?    If barriers are still present after following these steps, plese notify our team and we will try to assist.    Resources for FHCDA: https://nysba.org/fhcda-resource-center/
Medicine NP Episodic Note    CC: Fever    Notified by RN at 04:47, pt's oral temp. 100.1F.  At this time, chart reviewed and noted documented fever overnight of 100.8F at 22:13.  Pt. seen and examined at bedside, A+Ox1, in no acute distress.  Denies c/o SOB, CP, diaphoresis, chills, or any other specific complaints.  Rectal temp obtained at 05:00 and noted to be 100.4F.       Vital Signs Last 24 Hrs  T(C): 37.6 (19 Nov 2021 07:05), Max: 38.2 (18 Nov 2021 22:13)  T(F): 99.6 (19 Nov 2021 07:05), Max: 100.8 (18 Nov 2021 22:13)  HR: 102 (19 Nov 2021 04:40) (91 - 113)  BP: 122/74 (19 Nov 2021 04:40) (119/80 - 126/81)  BP(mean): --  RR: 18 (19 Nov 2021 04:40) (18 - 18)  SpO2: 94% (19 Nov 2021 04:40) (93% - 94%)      Labs:                          13.6   8.40  )-----------( 215      ( 19 Nov 2021 07:01 )             42.1     11-19    139  |  104  |  22  ----------------------------<  108<H>  3.6   |  23  |  0.66    Ca    8.9      19 Nov 2021 07:05  Mg     2.2     11-19          MEDICATIONS  (STANDING):  ascorbic acid 500 milliGRAM(s) Oral daily  cefTRIAXone   IVPB 1000 milliGRAM(s) IV Intermittent every 24 hours  citalopram 10 milliGRAM(s) Oral daily  cyanocobalamin 1000 MICROGram(s) Oral daily  donepezil 5 milliGRAM(s) Oral at bedtime  furosemide    Tablet 20 milliGRAM(s) Oral daily  metoprolol succinate ER 25 milliGRAM(s) Oral daily  multivitamin 1 Tablet(s) Oral daily  senna 1 Tablet(s) Oral at bedtime  traZODone 50 milliGRAM(s) Oral at bedtime    MEDICATIONS  (PRN):  acetaminophen     Tablet .. 650 milliGRAM(s) Oral every 6 hours PRN Temp greater or equal to 38C (100.4F), Mild Pain (1 - 3), Moderate Pain (4 - 6), Severe Pain (7 - 10)  risperiDONE   Tablet 0.25 milliGRAM(s) Oral three times a day PRN Agitation      Physical Exam:  Gen: WN/WD NAD  Neuro: A&Ox1  Resp: CTAB, resp. even and non-labored bilat   CV: RRR, S1S2  Abd: Soft, NT, ND, + BS x 4 quads  MSK: No edema, + peripheral pulses    Assessment & Plan:  HPI:  86F with PMHx of HTN and Alzheimer's Dementia sent in from [facility] for vaginal bleeding.  TVUS showed trace fluid in the endometrial cavity and an intracavitary lesion w/o definitive vascularity likely polyp.  Pt. was empirically started on Ceftriaxone for positive UA on 11/14 (large blood, 419 RBCs, only 3 WBCs, + nitrites & no LE) w/ final urine culture resulting as Klebsiella.  Abx treatment course was from 11/15 - 11/18.  Pt. now p/w fever.     # Fever  > Tylenol and cooling measures PRN for pyrexia   > Blood cultures x 2  > UA  > Case discussed w/ Dr. Gale, w/ recs to:    - Re-start pt. on Ceftriaxone    - Re-consult ID  > Will monitor vital signs  > F/u am labs     Will endorse to primary team in am.  Attending to follow.    Mikayla Davies, P-BC  [phone]

## 2021-11-20 LAB
ANION GAP SERPL CALC-SCNC: 12 MMOL/L — SIGNIFICANT CHANGE UP (ref 5–17)
BUN SERPL-MCNC: 29 MG/DL — HIGH (ref 7–23)
CALCIUM SERPL-MCNC: 8.9 MG/DL — SIGNIFICANT CHANGE UP (ref 8.4–10.5)
CHLORIDE SERPL-SCNC: 106 MMOL/L — SIGNIFICANT CHANGE UP (ref 96–108)
CO2 SERPL-SCNC: 23 MMOL/L — SIGNIFICANT CHANGE UP (ref 22–31)
CREAT SERPL-MCNC: 0.7 MG/DL — SIGNIFICANT CHANGE UP (ref 0.5–1.3)
GLUCOSE SERPL-MCNC: 111 MG/DL — HIGH (ref 70–99)
HCT VFR BLD CALC: 40.8 % — SIGNIFICANT CHANGE UP (ref 34.5–45)
HGB BLD-MCNC: 13.3 G/DL — SIGNIFICANT CHANGE UP (ref 11.5–15.5)
MAGNESIUM SERPL-MCNC: 2.1 MG/DL — SIGNIFICANT CHANGE UP (ref 1.6–2.6)
MCHC RBC-ENTMCNC: 30.6 PG — SIGNIFICANT CHANGE UP (ref 27–34)
MCHC RBC-ENTMCNC: 32.6 GM/DL — SIGNIFICANT CHANGE UP (ref 32–36)
MCV RBC AUTO: 94 FL — SIGNIFICANT CHANGE UP (ref 80–100)
NRBC # BLD: 0 /100 WBCS — SIGNIFICANT CHANGE UP (ref 0–0)
PLATELET # BLD AUTO: 194 K/UL — SIGNIFICANT CHANGE UP (ref 150–400)
POTASSIUM SERPL-MCNC: 3.7 MMOL/L — SIGNIFICANT CHANGE UP (ref 3.5–5.3)
POTASSIUM SERPL-SCNC: 3.7 MMOL/L — SIGNIFICANT CHANGE UP (ref 3.5–5.3)
RBC # BLD: 4.34 M/UL — SIGNIFICANT CHANGE UP (ref 3.8–5.2)
RBC # FLD: 13.1 % — SIGNIFICANT CHANGE UP (ref 10.3–14.5)
SODIUM SERPL-SCNC: 141 MMOL/L — SIGNIFICANT CHANGE UP (ref 135–145)
WBC # BLD: 8.25 K/UL — SIGNIFICANT CHANGE UP (ref 3.8–10.5)
WBC # FLD AUTO: 8.25 K/UL — SIGNIFICANT CHANGE UP (ref 3.8–10.5)

## 2021-11-20 RX ORDER — NYSTATIN CREAM 100000 [USP'U]/G
1 CREAM TOPICAL ONCE
Refills: 0 | Status: COMPLETED | OUTPATIENT
Start: 2021-11-20 | End: 2021-11-20

## 2021-11-20 RX ADMIN — RISPERIDONE 0.25 MILLIGRAM(S): 4 TABLET ORAL at 15:19

## 2021-11-20 RX ADMIN — CEFTRIAXONE 100 MILLIGRAM(S): 500 INJECTION, POWDER, FOR SOLUTION INTRAMUSCULAR; INTRAVENOUS at 06:13

## 2021-11-20 RX ADMIN — DONEPEZIL HYDROCHLORIDE 5 MILLIGRAM(S): 10 TABLET, FILM COATED ORAL at 21:50

## 2021-11-20 RX ADMIN — Medication 50 MILLIGRAM(S): at 21:50

## 2021-11-20 RX ADMIN — CITALOPRAM 10 MILLIGRAM(S): 10 TABLET, FILM COATED ORAL at 11:03

## 2021-11-20 RX ADMIN — Medication 1 TABLET(S): at 11:03

## 2021-11-20 RX ADMIN — Medication 500 MILLIGRAM(S): at 11:03

## 2021-11-20 RX ADMIN — NYSTATIN CREAM 1 APPLICATION(S): 100000 CREAM TOPICAL at 06:12

## 2021-11-20 RX ADMIN — PREGABALIN 1000 MICROGRAM(S): 225 CAPSULE ORAL at 11:03

## 2021-11-20 RX ADMIN — SENNA PLUS 1 TABLET(S): 8.6 TABLET ORAL at 21:50

## 2021-11-20 RX ADMIN — Medication 20 MILLIGRAM(S): at 06:12

## 2021-11-20 RX ADMIN — RISPERIDONE 0.25 MILLIGRAM(S): 4 TABLET ORAL at 23:59

## 2021-11-20 RX ADMIN — Medication 25 MILLIGRAM(S): at 06:12

## 2021-11-21 LAB — SARS-COV-2 RNA SPEC QL NAA+PROBE: SIGNIFICANT CHANGE UP

## 2021-11-21 RX ADMIN — Medication 500 MILLIGRAM(S): at 12:29

## 2021-11-21 RX ADMIN — CITALOPRAM 10 MILLIGRAM(S): 10 TABLET, FILM COATED ORAL at 12:29

## 2021-11-21 RX ADMIN — PREGABALIN 1000 MICROGRAM(S): 225 CAPSULE ORAL at 12:29

## 2021-11-21 RX ADMIN — DONEPEZIL HYDROCHLORIDE 5 MILLIGRAM(S): 10 TABLET, FILM COATED ORAL at 22:48

## 2021-11-21 RX ADMIN — RISPERIDONE 0.25 MILLIGRAM(S): 4 TABLET ORAL at 10:03

## 2021-11-21 RX ADMIN — Medication 50 MILLIGRAM(S): at 22:48

## 2021-11-21 RX ADMIN — Medication 25 MILLIGRAM(S): at 05:58

## 2021-11-21 RX ADMIN — SENNA PLUS 1 TABLET(S): 8.6 TABLET ORAL at 23:49

## 2021-11-21 RX ADMIN — CEFTRIAXONE 100 MILLIGRAM(S): 500 INJECTION, POWDER, FOR SOLUTION INTRAMUSCULAR; INTRAVENOUS at 06:31

## 2021-11-21 RX ADMIN — Medication 20 MILLIGRAM(S): at 05:58

## 2021-11-21 RX ADMIN — Medication 1 TABLET(S): at 12:29

## 2021-11-22 RX ADMIN — DONEPEZIL HYDROCHLORIDE 5 MILLIGRAM(S): 10 TABLET, FILM COATED ORAL at 22:38

## 2021-11-22 RX ADMIN — Medication 1 TABLET(S): at 11:31

## 2021-11-22 RX ADMIN — Medication 50 MILLIGRAM(S): at 22:38

## 2021-11-22 RX ADMIN — CITALOPRAM 10 MILLIGRAM(S): 10 TABLET, FILM COATED ORAL at 11:31

## 2021-11-22 RX ADMIN — Medication 500 MILLIGRAM(S): at 11:31

## 2021-11-22 RX ADMIN — RISPERIDONE 0.25 MILLIGRAM(S): 4 TABLET ORAL at 08:17

## 2021-11-22 RX ADMIN — PREGABALIN 1000 MICROGRAM(S): 225 CAPSULE ORAL at 11:31

## 2021-11-22 RX ADMIN — SENNA PLUS 1 TABLET(S): 8.6 TABLET ORAL at 22:38

## 2021-11-22 RX ADMIN — CEFTRIAXONE 100 MILLIGRAM(S): 500 INJECTION, POWDER, FOR SOLUTION INTRAMUSCULAR; INTRAVENOUS at 06:10

## 2021-11-22 RX ADMIN — Medication 25 MILLIGRAM(S): at 06:09

## 2021-11-22 RX ADMIN — Medication 20 MILLIGRAM(S): at 06:09

## 2021-11-22 RX ADMIN — RISPERIDONE 0.25 MILLIGRAM(S): 4 TABLET ORAL at 14:46

## 2021-11-23 LAB — SARS-COV-2 RNA SPEC QL NAA+PROBE: SIGNIFICANT CHANGE UP

## 2021-11-23 RX ORDER — PREGABALIN 225 MG/1
1 CAPSULE ORAL
Qty: 30 | Refills: 0
Start: 2021-11-23 | End: 2021-12-22

## 2021-11-23 RX ORDER — ASCORBIC ACID 60 MG
1 TABLET,CHEWABLE ORAL
Qty: 0 | Refills: 0 | DISCHARGE

## 2021-11-23 RX ORDER — CITALOPRAM 10 MG/1
1 TABLET, FILM COATED ORAL
Qty: 0 | Refills: 0 | DISCHARGE

## 2021-11-23 RX ORDER — DONEPEZIL HYDROCHLORIDE 10 MG/1
1 TABLET, FILM COATED ORAL
Qty: 30 | Refills: 0
Start: 2021-11-23 | End: 2021-12-22

## 2021-11-23 RX ORDER — ACETAMINOPHEN 500 MG
2 TABLET ORAL
Qty: 240 | Refills: 0
Start: 2021-11-23 | End: 2021-12-22

## 2021-11-23 RX ORDER — CITALOPRAM 10 MG/1
1 TABLET, FILM COATED ORAL
Qty: 30 | Refills: 0
Start: 2021-11-23 | End: 2021-12-22

## 2021-11-23 RX ORDER — TRAZODONE HCL 50 MG
1 TABLET ORAL
Qty: 30 | Refills: 0
Start: 2021-11-23 | End: 2021-12-22

## 2021-11-23 RX ORDER — METOPROLOL TARTRATE 50 MG
1 TABLET ORAL
Qty: 30 | Refills: 0
Start: 2021-11-23 | End: 2021-12-22

## 2021-11-23 RX ORDER — ASPIRIN/CALCIUM CARB/MAGNESIUM 324 MG
1 TABLET ORAL
Qty: 0 | Refills: 0 | DISCHARGE

## 2021-11-23 RX ORDER — SENNA PLUS 8.6 MG/1
1 TABLET ORAL
Qty: 30 | Refills: 0
Start: 2021-11-23 | End: 2021-12-22

## 2021-11-23 RX ORDER — ASCORBIC ACID 60 MG
1 TABLET,CHEWABLE ORAL
Qty: 30 | Refills: 0
Start: 2021-11-23 | End: 2021-12-22

## 2021-11-23 RX ORDER — TRAZODONE HCL 50 MG
1 TABLET ORAL
Qty: 0 | Refills: 0 | DISCHARGE

## 2021-11-23 RX ORDER — FUROSEMIDE 40 MG
1 TABLET ORAL
Qty: 30 | Refills: 0
Start: 2021-11-23 | End: 2021-12-22

## 2021-11-23 RX ORDER — FUROSEMIDE 40 MG
1 TABLET ORAL
Qty: 0 | Refills: 0 | DISCHARGE

## 2021-11-23 RX ORDER — SENNA PLUS 8.6 MG/1
2 TABLET ORAL
Qty: 0 | Refills: 0 | DISCHARGE

## 2021-11-23 RX ORDER — RISPERIDONE 4 MG/1
1 TABLET ORAL
Qty: 0 | Refills: 0 | DISCHARGE

## 2021-11-23 RX ORDER — PREGABALIN 225 MG/1
1 CAPSULE ORAL
Qty: 0 | Refills: 0 | DISCHARGE

## 2021-11-23 RX ORDER — METOPROLOL TARTRATE 50 MG
1 TABLET ORAL
Qty: 0 | Refills: 0 | DISCHARGE

## 2021-11-23 RX ORDER — RISPERIDONE 4 MG/1
1 TABLET ORAL
Qty: 90 | Refills: 0
Start: 2021-11-23 | End: 2021-12-22

## 2021-11-23 RX ADMIN — Medication 50 MILLIGRAM(S): at 22:31

## 2021-11-23 RX ADMIN — Medication 500 MILLIGRAM(S): at 11:52

## 2021-11-23 RX ADMIN — DONEPEZIL HYDROCHLORIDE 5 MILLIGRAM(S): 10 TABLET, FILM COATED ORAL at 22:31

## 2021-11-23 RX ADMIN — PREGABALIN 1000 MICROGRAM(S): 225 CAPSULE ORAL at 11:52

## 2021-11-23 RX ADMIN — Medication 1 TABLET(S): at 11:52

## 2021-11-23 RX ADMIN — Medication 20 MILLIGRAM(S): at 06:03

## 2021-11-23 RX ADMIN — SENNA PLUS 1 TABLET(S): 8.6 TABLET ORAL at 22:31

## 2021-11-23 RX ADMIN — RISPERIDONE 0.25 MILLIGRAM(S): 4 TABLET ORAL at 18:03

## 2021-11-23 RX ADMIN — Medication 25 MILLIGRAM(S): at 06:03

## 2021-11-23 RX ADMIN — CITALOPRAM 10 MILLIGRAM(S): 10 TABLET, FILM COATED ORAL at 11:53

## 2021-11-24 ENCOUNTER — TRANSCRIPTION ENCOUNTER (OUTPATIENT)
Age: 86
End: 2021-11-24

## 2021-11-24 VITALS
OXYGEN SATURATION: 96 % | DIASTOLIC BLOOD PRESSURE: 79 MMHG | HEART RATE: 91 BPM | SYSTOLIC BLOOD PRESSURE: 115 MMHG | RESPIRATION RATE: 18 BRPM | TEMPERATURE: 99 F

## 2021-11-24 LAB
ANION GAP SERPL CALC-SCNC: 12 MMOL/L — SIGNIFICANT CHANGE UP (ref 5–17)
BUN SERPL-MCNC: 20 MG/DL — SIGNIFICANT CHANGE UP (ref 7–23)
CALCIUM SERPL-MCNC: 9.2 MG/DL — SIGNIFICANT CHANGE UP (ref 8.4–10.5)
CHLORIDE SERPL-SCNC: 103 MMOL/L — SIGNIFICANT CHANGE UP (ref 96–108)
CO2 SERPL-SCNC: 24 MMOL/L — SIGNIFICANT CHANGE UP (ref 22–31)
CREAT SERPL-MCNC: 0.59 MG/DL — SIGNIFICANT CHANGE UP (ref 0.5–1.3)
CULTURE RESULTS: SIGNIFICANT CHANGE UP
CULTURE RESULTS: SIGNIFICANT CHANGE UP
GLUCOSE SERPL-MCNC: 111 MG/DL — HIGH (ref 70–99)
POTASSIUM SERPL-MCNC: 3.7 MMOL/L — SIGNIFICANT CHANGE UP (ref 3.5–5.3)
POTASSIUM SERPL-SCNC: 3.7 MMOL/L — SIGNIFICANT CHANGE UP (ref 3.5–5.3)
SODIUM SERPL-SCNC: 139 MMOL/L — SIGNIFICANT CHANGE UP (ref 135–145)
SPECIMEN SOURCE: SIGNIFICANT CHANGE UP
SPECIMEN SOURCE: SIGNIFICANT CHANGE UP

## 2021-11-24 PROCEDURE — 86901 BLOOD TYPING SEROLOGIC RH(D): CPT

## 2021-11-24 PROCEDURE — 97116 GAIT TRAINING THERAPY: CPT

## 2021-11-24 PROCEDURE — 97162 PT EVAL MOD COMPLEX 30 MIN: CPT

## 2021-11-24 PROCEDURE — 80048 BASIC METABOLIC PNL TOTAL CA: CPT

## 2021-11-24 PROCEDURE — 86900 BLOOD TYPING SEROLOGIC ABO: CPT

## 2021-11-24 PROCEDURE — 87186 SC STD MICRODIL/AGAR DIL: CPT

## 2021-11-24 PROCEDURE — 87077 CULTURE AEROBIC IDENTIFY: CPT

## 2021-11-24 PROCEDURE — 97530 THERAPEUTIC ACTIVITIES: CPT

## 2021-11-24 PROCEDURE — U0005: CPT

## 2021-11-24 PROCEDURE — 87040 BLOOD CULTURE FOR BACTERIA: CPT

## 2021-11-24 PROCEDURE — 99285 EMERGENCY DEPT VISIT HI MDM: CPT | Mod: 25

## 2021-11-24 PROCEDURE — 86769 SARS-COV-2 COVID-19 ANTIBODY: CPT

## 2021-11-24 PROCEDURE — 96374 THER/PROPH/DIAG INJ IV PUSH: CPT

## 2021-11-24 PROCEDURE — 96372 THER/PROPH/DIAG INJ SC/IM: CPT | Mod: XU

## 2021-11-24 PROCEDURE — 93005 ELECTROCARDIOGRAM TRACING: CPT

## 2021-11-24 PROCEDURE — 83735 ASSAY OF MAGNESIUM: CPT

## 2021-11-24 PROCEDURE — 74177 CT ABD & PELVIS W/CONTRAST: CPT

## 2021-11-24 PROCEDURE — 76830 TRANSVAGINAL US NON-OB: CPT

## 2021-11-24 PROCEDURE — 85027 COMPLETE CBC AUTOMATED: CPT

## 2021-11-24 PROCEDURE — 81001 URINALYSIS AUTO W/SCOPE: CPT

## 2021-11-24 PROCEDURE — 86850 RBC ANTIBODY SCREEN: CPT

## 2021-11-24 PROCEDURE — 36415 COLL VENOUS BLD VENIPUNCTURE: CPT

## 2021-11-24 PROCEDURE — U0003: CPT

## 2021-11-24 PROCEDURE — 87086 URINE CULTURE/COLONY COUNT: CPT

## 2021-11-24 PROCEDURE — 80053 COMPREHEN METABOLIC PANEL: CPT

## 2021-11-24 RX ADMIN — CITALOPRAM 10 MILLIGRAM(S): 10 TABLET, FILM COATED ORAL at 12:50

## 2021-11-24 RX ADMIN — Medication 50 MILLIGRAM(S): at 21:18

## 2021-11-24 RX ADMIN — Medication 25 MILLIGRAM(S): at 05:24

## 2021-11-24 RX ADMIN — Medication 500 MILLIGRAM(S): at 12:50

## 2021-11-24 RX ADMIN — Medication 20 MILLIGRAM(S): at 05:24

## 2021-11-24 RX ADMIN — DONEPEZIL HYDROCHLORIDE 5 MILLIGRAM(S): 10 TABLET, FILM COATED ORAL at 21:18

## 2021-11-24 RX ADMIN — Medication 1 TABLET(S): at 12:50

## 2021-11-24 RX ADMIN — PREGABALIN 1000 MICROGRAM(S): 225 CAPSULE ORAL at 12:50

## 2021-11-24 NOTE — PROGRESS NOTE ADULT - SUBJECTIVE AND OBJECTIVE BOX
CC: f/u for fever    Patient reports she is ok    REVIEW OF SYSTEMS:  All other review of systems negative (Comprehensive ROS)    Antimicrobials Day #  :7/7  cefTRIAXone   IVPB 1000 milliGRAM(s) IV Intermittent every 24 hours    Other Medications Reviewed    T(F): 99 (11-22-21 @ 12:51), Max: 99.9 (11-21-21 @ 21:15)  HR: 84 (11-22-21 @ 12:51)  BP: 133/85 (11-22-21 @ 12:51)  RR: 20 (11-22-21 @ 12:51)  SpO2: 95% (11-22-21 @ 12:51)  Wt(kg): --    PHYSICAL EXAM:  General: alert, no acute distress  Eyes:  anicteric, no conjunctival injection, no discharge  Oropharynx: no lesions or injection 	  Neck: supple, without adenopathy  Lungs: clear to auscultation  Heart: regular rate and rhythm; no murmur, rubs or gallops  Abdomen: soft, nondistended, nontender, without mass or organomegaly  Skin: no lesions  Extremities: no clubbing, cyanosis, or edema  Neurologic: alert, , moves all extremities    LAB RESULTS:              MICROBIOLOGY:  RECENT CULTURES:  11-19 @ 08:59 .Blood Blood-Peripheral     No growth to date.          RADIOLOGY REVIEWED:    EXAM:  CT ABDOMEN AND PELVIS IC                            PROCEDURE DATE:  11/18/2021            INTERPRETATION:  CLINICAL INFORMATION: Vaginal bleeding. Evaluate for mass.    COMPARISON: Pelvic ultrasound 11/16/2021.  image from CT pelvis 2/1/2019.    CONTRAST/COMPLICATIONS:  IV Contrast: Omnipaque 350  90 cc administered   10 cc discarded  Oral Contrast: NONE  Complications: None reported at time of study completion    PROCEDURE:  CT of the Abdomen and Pelvis was performed.  Sagittal and coronal reformats were performed.    FINDINGS:  LOWER CHEST: Large hiatal hernia containing stomach and portion of the transverse colon and pancreas.    LIVER: Within normal limits.  BILE DUCTS: Normal caliber.  GALLBLADDER: Within normal limits.  SPLEEN: Within normal limits.  PANCREAS: Within normal limits.  ADRENALS: Within normal limits.  KIDNEYS/URETERS: No hydronephrosis. Bilateral renal cysts and hypodensities too small to characterize.    BLADDER: Evaluation is limited due to streak artifact from hip prostheses  REPRODUCTIVE ORGANS: Calcified uterine fibroid. Evaluation is limited due to streak artifact from bilateral hip prostheses.    BOWEL: No bowel obstruction. Appendix is not visualized. Colonic diverticulosis.  PERITONEUM: No ascites.  VESSELS: Atherosclerotic changes.  RETROPERITONEUM/LYMPH NODES: Subcentimeter retroperitoneal lymph nodes.  ABDOMINAL WALL: Small fat-containing umbilical hernia.  BONES: Status post right hip arthroplasty and left femoral jerry and screw fixation. Mild vertebral body height loss of the lower thoracic and L3 vertebral bodies.    IMPRESSION:  Streak artifact from patient's bilateral hip prostheses limits evaluation of the pelvis. A previously noted endometrial lesion/polyp on ultrasound is difficult to evaluate on CT.    Large hiatal hernia containing stomach and portion of transverse colon and pancreas.    --- End of Report ---              < from: US Transvaginal (11.16.21 @ 11:51) >  IMPRESSION:  Endometrium mildly distended with fluid and an 1.7 cm avascular echogenic focus at the lower uterine segment, possibly a polyp or blood products given history of vaginal bleeding. Correlate with tissue sampling or sonohysterogram.    Calcified uterine fibroid.    < end of copied text >  Assessment:  Elderly woman admitted for vaginal bleeding, some episodes of fever now finishing a course of ceftriaxone. Unclear source but afebrile of late and stable. Hospice referral noted  Plan:  stop ceftriaxone today as planned
Patient is a 86y old  Female who presents with a chief complaint of Vaginal Bleeding (2021 15:31)      SUBJECTIVE / OVERNIGHT EVENTS: Comfortable Daughter in law at bedside.  Review of Systems  chest pain no  palpitations no  sob no  nausea no  headache no    MEDICATIONS  (STANDING):  ascorbic acid 500 milliGRAM(s) Oral daily  citalopram 10 milliGRAM(s) Oral daily  cyanocobalamin 1000 MICROGram(s) Oral daily  donepezil 5 milliGRAM(s) Oral at bedtime  furosemide    Tablet 20 milliGRAM(s) Oral daily  metoprolol succinate ER 25 milliGRAM(s) Oral daily  multivitamin 1 Tablet(s) Oral daily  senna 1 Tablet(s) Oral at bedtime  traZODone 50 milliGRAM(s) Oral at bedtime    MEDICATIONS  (PRN):  acetaminophen     Tablet .. 650 milliGRAM(s) Oral every 6 hours PRN Temp greater or equal to 38C (100.4F), Mild Pain (1 - 3), Moderate Pain (4 - 6), Severe Pain (7 - 10)  risperiDONE   Tablet 0.25 milliGRAM(s) Oral three times a day PRN Agitation      Vital Signs Last 24 Hrs  T(C): 36.7 (15 Nov 2021 16:29), Max: 37.1 (15 Nov 2021 10:53)  T(F): 98.1 (15 Nov 2021 16:29), Max: 98.7 (15 Nov 2021 10:53)  HR: 82 (15 Nov 2021 16:29) (72 - 90)  BP: 159/83 (15 Nov 2021 16:29) (123/72 - 159/83)  BP(mean): 88 (2021 23:39) (88 - 88)  RR: 18 (15 Nov 2021 16:29) (16 - 18)  SpO2: 97% (15 Nov 2021 16:29) (96% - 100%)    PHYSICAL EXAM:  GENERAL: NAD  HEAD:  Atraumatic, Normocephalic  EYES: EOMI, PERRLA, conjunctiva and sclera clear  NECK: Supple, No JVD  CHEST/LUNG: Clear to auscultation bilaterally; No wheeze  HEART: Regular rate and rhythm; No murmurs, rubs, or gallops  ABDOMEN: Soft, Nontender, Nondistended; Bowel sounds present  EXTREMITIES:  2+ Peripheral Pulses, No clubbing, cyanosis, or edema  PSYCH: confused  NEUROLOGY: non-focal  SKIN: No rashes or lesions    LABS:                        12.6   4.84  )-----------( 176      ( 15 Nov 2021 07:53 )             39.0     11-15    141  |  105  |  12  ----------------------------<  85  4.0   |  22  |  0.57    Ca    9.3      15 Nov 2021 07:53    TPro  6.2  /  Alb  3.7  /  TBili  0.4  /  DBili  x   /  AST  13  /  ALT  20  /  AlkPhos  82  11-14          Urinalysis Basic - ( 2021 09:27 )    Color: Yellow / Appearance: Clear / S.019 / pH: x  Gluc: x / Ketone: Negative  / Bili: Negative / Urobili: Negative   Blood: x / Protein: Trace / Nitrite: Positive   Leuk Esterase: Negative / RBC: 419 /hpf / WBC 3 /HPF   Sq Epi: x / Non Sq Epi: 1 /hpf / Bacteria: Many          RADIOLOGY & ADDITIONAL TESTS:    Imaging Personally Reviewed:    Consultant(s) Notes Reviewed:      Care Discussed with Consultants/Other Providers:  
Patient is a 86y old  Female who presents with a chief complaint of Vaginal Bleeding (22 Nov 2021 20:45)      SUBJECTIVE / OVERNIGHT EVENTS: Comfortable   Review of Systems  chest pain no  palpitations no  sob no  nausea no  headache no    MEDICATIONS  (STANDING):  ascorbic acid 500 milliGRAM(s) Oral daily  citalopram 10 milliGRAM(s) Oral daily  cyanocobalamin 1000 MICROGram(s) Oral daily  donepezil 5 milliGRAM(s) Oral at bedtime  furosemide    Tablet 20 milliGRAM(s) Oral daily  metoprolol succinate ER 25 milliGRAM(s) Oral daily  multivitamin 1 Tablet(s) Oral daily  senna 1 Tablet(s) Oral at bedtime  traZODone 50 milliGRAM(s) Oral at bedtime    MEDICATIONS  (PRN):  acetaminophen     Tablet .. 650 milliGRAM(s) Oral every 6 hours PRN Temp greater or equal to 38C (100.4F), Mild Pain (1 - 3), Moderate Pain (4 - 6), Severe Pain (7 - 10)  risperiDONE   Tablet 0.25 milliGRAM(s) Oral three times a day PRN Agitation      Vital Signs Last 24 Hrs  T(C): 37.3 (23 Nov 2021 14:53), Max: 37.3 (23 Nov 2021 14:53)  T(F): 99.2 (23 Nov 2021 14:53), Max: 99.2 (23 Nov 2021 14:53)  HR: 95 (23 Nov 2021 14:53) (81 - 95)  BP: 115/75 (23 Nov 2021 14:53) (113/77 - 115/75)  BP(mean): --  RR: 20 (23 Nov 2021 14:53) (18 - 20)  SpO2: 95% (23 Nov 2021 14:53) (95% - 95%)    PHYSICAL EXAM:  GENERAL: NAD, well-developed  HEAD:  Atraumatic, Normocephalic  EYES: EOMI, PERRLA, conjunctiva and sclera clear  NECK: Supple, No JVD  CHEST/LUNG: Clear to auscultation bilaterally; No wheeze  HEART: Regular rate and rhythm; No murmurs, rubs, or gallops  ABDOMEN: Soft, Nontender, Nondistended; Bowel sounds present  EXTREMITIES:  2+ Peripheral Pulses, No clubbing, cyanosis, or edema  PSYCH: confused  NEUROLOGY: non-focal  SKIN: No rashes or lesions    LABS:                      RADIOLOGY & ADDITIONAL TESTS:    Imaging Personally Reviewed:    Consultant(s) Notes Reviewed:      Care Discussed with Consultants/Other Providers:  
Patient is a 86y old  Female who presents with a chief complaint of Vaginal Bleeding (18 Nov 2021 10:57)      SUBJECTIVE / OVERNIGHT EVENTS: Comfortable. Son at bedside  Review of Systems  chest pain no  palpitations no  sob no  nausea no  headache no    MEDICATIONS  (STANDING):  ascorbic acid 500 milliGRAM(s) Oral daily  citalopram 10 milliGRAM(s) Oral daily  cyanocobalamin 1000 MICROGram(s) Oral daily  donepezil 5 milliGRAM(s) Oral at bedtime  furosemide    Tablet 20 milliGRAM(s) Oral daily  metoprolol succinate ER 25 milliGRAM(s) Oral daily  multivitamin 1 Tablet(s) Oral daily  senna 1 Tablet(s) Oral at bedtime  traZODone 50 milliGRAM(s) Oral at bedtime    MEDICATIONS  (PRN):  acetaminophen     Tablet .. 650 milliGRAM(s) Oral every 6 hours PRN Temp greater or equal to 38C (100.4F), Mild Pain (1 - 3), Moderate Pain (4 - 6), Severe Pain (7 - 10)  risperiDONE   Tablet 0.25 milliGRAM(s) Oral three times a day PRN Agitation      Vital Signs Last 24 Hrs  T(C): 36.6 (18 Nov 2021 13:00), Max: 37.5 (18 Nov 2021 04:57)  T(F): 97.9 (18 Nov 2021 13:00), Max: 99.5 (18 Nov 2021 04:57)  HR: 99 (18 Nov 2021 13:00) (92 - 99)  BP: 119/80 (18 Nov 2021 13:00) (119/80 - 124/80)  BP(mean): --  RR: 18 (18 Nov 2021 13:00) (18 - 18)  SpO2: 93% (18 Nov 2021 13:00) (92% - 95%)    PHYSICAL EXAM:  GENERAL: NAD, well-developed  HEAD:  Atraumatic, Normocephalic  EYES: EOMI, PERRLA, conjunctiva and sclera clear  NECK: Supple, No JVD  CHEST/LUNG: Clear to auscultation bilaterally; No wheeze  HEART: Regular rate and rhythm; No murmurs, rubs, or gallops  ABDOMEN: Soft, Nontender, Nondistended; Bowel sounds present  EXTREMITIES:  2+ Peripheral Pulses, No clubbing, cyanosis, or edema  PSYCH: confused  NEUROLOGY: non-focal  SKIN: No rashes or lesions    LABS:                        13.7   7.74  )-----------( 213      ( 17 Nov 2021 06:33 )             41.4     11-17    142  |  106  |  27<H>  ----------------------------<  112<H>  3.7   |  22  |  0.79    Ca    9.2      17 Nov 2021 06:33                  RADIOLOGY & ADDITIONAL TESTS:    Imaging Personally Reviewed:  < from: CT Abdomen and Pelvis w/ IV Cont (11.18.21 @ 16:36) >  IMPRESSION:  Streak artifact from patient's bilateral hip prostheses limits evaluation of the pelvis. A previously noted endometrial lesion/polyp on ultrasound is difficult to evaluate on CT.    Large hiatal hernia containing stomach and portion of transverse colon and pancreas.    < end of copied text >    Consultant(s) Notes Reviewed:      Care Discussed with Consultants/Other Providers:  
Patient is a 86y old  Female who presents with a chief complaint of Vaginal Bleeding (21 Nov 2021 11:46)      SUBJECTIVE / OVERNIGHT EVENTS: Comfortable   Review of Systems  chest pain no  palpitations no  sob no  nausea no  headache no    MEDICATIONS  (STANDING):  ascorbic acid 500 milliGRAM(s) Oral daily  cefTRIAXone   IVPB 1000 milliGRAM(s) IV Intermittent every 24 hours  citalopram 10 milliGRAM(s) Oral daily  cyanocobalamin 1000 MICROGram(s) Oral daily  donepezil 5 milliGRAM(s) Oral at bedtime  furosemide    Tablet 20 milliGRAM(s) Oral daily  metoprolol succinate ER 25 milliGRAM(s) Oral daily  multivitamin 1 Tablet(s) Oral daily  senna 1 Tablet(s) Oral at bedtime  traZODone 50 milliGRAM(s) Oral at bedtime    MEDICATIONS  (PRN):  acetaminophen     Tablet .. 650 milliGRAM(s) Oral every 6 hours PRN Temp greater or equal to 38C (100.4F), Mild Pain (1 - 3), Moderate Pain (4 - 6), Severe Pain (7 - 10)  risperiDONE   Tablet 0.25 milliGRAM(s) Oral three times a day PRN Agitation      Vital Signs Last 24 Hrs  T(C): 37.2 (22 Nov 2021 12:51), Max: 37.7 (21 Nov 2021 21:15)  T(F): 99 (22 Nov 2021 12:51), Max: 99.9 (21 Nov 2021 21:15)  HR: 84 (22 Nov 2021 12:51) (79 - 87)  BP: 133/85 (22 Nov 2021 12:51) (132/85 - 133/85)  BP(mean): --  RR: 20 (22 Nov 2021 12:51) (18 - 20)  SpO2: 95% (22 Nov 2021 12:51) (93% - 95%)    PHYSICAL EXAM:  GENERAL: NAD, well-developed  HEAD:  Atraumatic, Normocephalic  EYES: EOMI, PERRLA, conjunctiva and sclera clear  NECK: Supple, No JVD  CHEST/LUNG: Clear to auscultation bilaterally; No wheeze  HEART: Regular rate and rhythm; No murmurs, rubs, or gallops  ABDOMEN: Soft, Nontender, Nondistended; Bowel sounds present  EXTREMITIES:  2+ Peripheral Pulses, No clubbing, cyanosis, or edema  PSYCH: confused  NEUROLOGY: non-focal  SKIN: No rashes or lesions    LABS:                      RADIOLOGY & ADDITIONAL TESTS:    Imaging Personally Reviewed:    Consultant(s) Notes Reviewed:      Care Discussed with Consultants/Other Providers:  
CC: f/u for fevers     Patient reports nothing    REVIEW OF SYSTEMS:  Not interactive. Cooperative to exam, even takes deep breaths when asked to but answers no questions. When asked where she was - she just repeated my words    Antimicrobials Day #  : 4  cefTRIAXone   IVPB 1000 milliGRAM(s) IV Intermittent every 24 hours    Other Medications Reviewed    T(F): 97.3 (11-19-21 @ 09:00), Max: 100.8 (11-18-21 @ 22:13)  HR: 81 (11-19-21 @ 09:00)  BP: 126/81 (11-19-21 @ 09:00)  RR: 17 (11-19-21 @ 09:00)  SpO2: 96% (11-19-21 @ 09:00)  Wt(kg): --    PHYSICAL EXAM:  General: awake, alert, no acute distress  Eyes:  anicteric, no conjunctival injection, no discharge  Oropharynx: no lesions on tongue 	  Neck: supple  Lungs: clear to auscultation  Heart: regular rate and rhythm; no murmurs  Abdomen: soft, nondistended, nontender, bowel sounds +  Skin: no lesions  Extremities: no clubbing, cyanosis, or edema  Neurologic: awake but makes no attempt to communicate with the examiner. In NAD     LAB RESULTS:                        13.6   8.40  )-----------( 215      ( 19 Nov 2021 07:01 )             42.1     11-19    139  |  104  |  22  ----------------------------<  108<H>  3.6   |  23  |  0.66    Ca    8.9      19 Nov 2021 07:05  Mg     2.2     11-19          MICROBIOLOGY:  RECENT CULTURES:  11-14 @ 12:35 Catheterized Catheterized Klebsiella pneumoniae    >100,000 CFU/ml Klebsiella pneumoniae      RADIOLOGY REVIEWED:  < from: CT Abdomen and Pelvis w/ IV Cont (11.18.21 @ 16:36) >  IMPRESSION:  Streak artifact from patient's bilateral hip prostheses limits evaluation of the pelvis. A previously noted endometrial lesion/polyp on ultrasound is difficult to evaluate on CT.  Large hiatal hernia containing stomach and portion of transverse colon and pancreas.    < end of copied text >    < from: US Transvaginal (11.16.21 @ 11:51) >  IMPRESSION:  Endometrium mildly distended with fluid and an 1.7 cm avascular echogenic focus at the lower uterine segment, possibly a polyp or blood products given history of vaginal bleeding. Correlate with tissue sampling or sonohysterogram.  Calcified uterine fibroid.    < end of copied text >  
Patient is a 86y old  Female who presents with a chief complaint of Vaginal Bleeding (24 Nov 2021 11:25)      SUBJECTIVE / OVERNIGHT EVENTS: Comfortable   Review of Systems  chest pain no  palpitations no  sob no  nausea no  headache no    MEDICATIONS  (STANDING):  ascorbic acid 500 milliGRAM(s) Oral daily  citalopram 10 milliGRAM(s) Oral daily  cyanocobalamin 1000 MICROGram(s) Oral daily  donepezil 5 milliGRAM(s) Oral at bedtime  furosemide    Tablet 20 milliGRAM(s) Oral daily  metoprolol succinate ER 25 milliGRAM(s) Oral daily  multivitamin 1 Tablet(s) Oral daily  senna 1 Tablet(s) Oral at bedtime  traZODone 50 milliGRAM(s) Oral at bedtime    MEDICATIONS  (PRN):  acetaminophen     Tablet .. 650 milliGRAM(s) Oral every 6 hours PRN Temp greater or equal to 38C (100.4F), Mild Pain (1 - 3), Moderate Pain (4 - 6), Severe Pain (7 - 10)  risperiDONE   Tablet 0.25 milliGRAM(s) Oral three times a day PRN Agitation      Vital Signs Last 24 Hrs  T(C): 37.2 (24 Nov 2021 21:12), Max: 37.2 (24 Nov 2021 21:12)  T(F): 99 (24 Nov 2021 21:12), Max: 99 (24 Nov 2021 21:12)  HR: 91 (24 Nov 2021 21:12) (88 - 95)  BP: 115/79 (24 Nov 2021 21:12) (107/71 - 120/82)  BP(mean): --  RR: 18 (24 Nov 2021 21:12) (18 - 18)  SpO2: 96% (24 Nov 2021 21:12) (96% - 96%)    PHYSICAL EXAM:  GENERAL: NAD, well-developed  HEAD:  Atraumatic, Normocephalic  EYES: EOMI, PERRLA, conjunctiva and sclera clear  NECK: Supple, No JVD  CHEST/LUNG: Clear to auscultation bilaterally; No wheeze  HEART: Regular rate and rhythm; No murmurs, rubs, or gallops  ABDOMEN: Soft, Nontender, Nondistended; Bowel sounds present  EXTREMITIES:  2+ Peripheral Pulses, No clubbing, cyanosis, or edema  PSYCH: confused  NEUROLOGY: non-focal  SKIN: No rashes or lesions    LABS:    11-24    139  |  103  |  20  ----------------------------<  111<H>  3.7   |  24  |  0.59    Ca    9.2      24 Nov 2021 06:35                  RADIOLOGY & ADDITIONAL TESTS:    Imaging Personally Reviewed:    Consultant(s) Notes Reviewed:      Care Discussed with Consultants/Other Providers:  
  CC: f/u for Klebsiella UTI    Patient is resting in bed     REVIEW OF SYSTEMS:  All other review of systems negative (Comprehensive ROS)      T(F): 99.4 (11-17-21 @ 10:10), Max: 99.4 (11-17-21 @ 10:10)  HR: 115 (11-17-21 @ 10:10)  BP: 128/78 (11-17-21 @ 10:10)  RR: 18 (11-17-21 @ 10:10)  SpO2: 93% (11-17-21 @ 10:10)  Wt(kg): --    PHYSICAL EXAM:  General: no acute distress  Eyes:  anicteric, no conjunctival injection, no discharge  Oropharynx: no lesions or injection 	  Lungs: clear to auscultation  Heart: regular rate and rhythm; no murmur, rubs or gallops  Abdomen: soft, nondistended, nontender, without mass or organomegaly  Skin: no lesions  Extremities: no clubbing, cyanosis, or edema  Neurologic: pt confused she is lying in bed     LAB RESULTS:                        13.7   7.74  )-----------( 213      ( 17 Nov 2021 06:33 )             41.4     11-17    142  |  106  |  27<H>  ----------------------------<  112<H>  3.7   |  22  |  0.79    Ca    9.2      17 Nov 2021 06:33          MICROBIOLOGY:  RECENT CULTURES:  11-14 @ 12:35 Catheterized Catheterized Klebsiella pneumoniae    >100,000 CFU/ml Klebsiella pneumoniae          RADIOLOGY REVIEWED:        Antimicrobials Day #    cefTRIAXone   IVPB 1000 milliGRAM(s) IV Intermittent every 24 hours    Other Medications Reviewed    
CC: f/u for Klebsiella     Patient reports no complaints     REVIEW OF SYSTEMS:  Poorly interactive. Reports that she knows where she is, but does not talk, nods no to pain,  / GI complaints etc.     Antimicrobials Day #  : completed    Other Medications Reviewed    T(F): 99.5 (11-18-21 @ 04:57), Max: 99.5 (11-18-21 @ 04:57)  HR: 96 (11-18-21 @ 04:57)  BP: 124/80 (11-18-21 @ 04:57)  RR: 18 (11-18-21 @ 04:57)  SpO2: 92% (11-18-21 @ 04:57)  Wt(kg): --    PHYSICAL EXAM:  General: alert, no acute distress  Eyes:  anicteric, no conjunctival injection, no discharge  Neck: supple  Lungs: clear to auscultation  Heart: regular rate and rhythm; no murmurs  Abdomen: soft, nondistended, nontender, without mass or organomegaly  Skin: no lesions  Extremities: no clubbing, cyanosis, or edema  Neurologic: awake, in NAD but poorly interactive    LAB RESULTS:                        13.7   7.74  )-----------( 213      ( 17 Nov 2021 06:33 )             41.4     11-17    142  |  106  |  27<H>  ----------------------------<  112<H>  3.7   |  22  |  0.79    Ca    9.2      17 Nov 2021 06:33    MICROBIOLOGY:  RECENT CULTURES:  11-14 @ 12:35 Catheterized Catheterized Klebsiella pneumoniae    >100,000 CFU/ml Klebsiella pneumoniae                RADIOLOGY REVIEWED:    
CC: f/u for fevers    Patient reports no complaints     REVIEW OF SYSTEMS:  Limited - poorly interactive. Smiles to greeting & said hello. Unable to provide any other info. Nodded "no" to pain.     Antimicrobials Day #  :   cefTRIAXone   IVPB 1000 milliGRAM(s) IV Intermittent every 24 hours    Other Medications Reviewed    T(F): 98.6 (21 @ 05:16), Max: 99.6 (21 @ 20:58)  HR: 84 (21 @ 05:16)  BP: 123/80 (21 @ 05:16)  RR: 18 (21 @ 05:16)  SpO2: 95% (21 @ 05:16)  Wt(kg): --    PHYSICAL EXAM:  General: awake, alert, no acute distress  Eyes:  anicteric, no conjunctival injection, no discharge  Neck: supple  Lungs: clear to auscultation  Heart: regular rate and rhythm; no murmurs  Abdomen: soft, nondistended, nontender, bowel sounds +  Skin: no decubitus ulcer, faint erythema   Extremities: no clubbing, cyanosis, or edema  Neurologic: awake but does not attempt to communicate with the examiner. In NAD     LAB RESULTS:                        13.3   8.25  )-----------( 194      ( 2021 06:21 )             40.8         141  |  106  |  29<H>  ----------------------------<  111<H>  3.7   |  23  |  0.70    Ca    8.9      2021 06:21  Mg     2.1           Urinalysis Basic - ( 2021 16:03 )    Color: Yellow / Appearance: Slightly Turbid / S.040 / pH: x  Gluc: x / Ketone: Negative  / Bili: Negative / Urobili: Negative   Blood: x / Protein: 30 mg/dL / Nitrite: Negative   Leuk Esterase: Negative / RBC: 4 /hpf / WBC 4 /HPF   Sq Epi: x / Non Sq Epi: 1 /hpf / Bacteria: Negative      MICROBIOLOGY:  RECENT CULTURES:   @ 08:59 .Blood Blood-Peripheral     No growth to date.      RADIOLOGY REVIEWED:    
CC: f/u for fevers    Patient reports no complaints     REVIEW OF SYSTEMS:  limited - answered a couple of questions selectively, by nodding. Prefers not to speak. Does not look at the examiner. No pains - no fevers.     Antimicrobials Day #  : 5  cefTRIAXone   IVPB 1000 milliGRAM(s) IV Intermittent every 24 hours    Other Medications Reviewed    T(F): 98.4 (21 @ 05:54), Max: 99.8 (21 @ 20:07)  HR: 109 (21 @ 06:11)  BP: 132/81 (21 @ 06:11)  RR: 18 (21 @ 05:54)  SpO2: 96% (21 @ 05:54)  Wt(kg): --    PHYSICAL EXAM:  General: awake, alert, no acute distress  Eyes:  anicteric, no conjunctival injection, no discharge  Oropharynx: no lesions on tongue 	  Neck: supple  Lungs: clear to auscultation  Heart: regular rate and rhythm; no murmurs  Abdomen: soft, nondistended, nontender, bowel sounds +  Skin: no decubitus ulcer, faint erythema   Extremities: no clubbing, cyanosis, or edema  Neurologic: awake but makes minimal attempt to communicate with the examiner. In NAD     LAB RESULTS:                        13.3   8.25  )-----------( 194      ( 2021 06:21 )             40.8         141  |  106  |  29<H>  ----------------------------<  111<H>  3.7   |  23  |  0.70    Ca    8.9      2021 06:21  Mg     2.1             Urinalysis Basic - ( 2021 16:03 )    Color: Yellow / Appearance: Slightly Turbid / S.040 / pH: x  Gluc: x / Ketone: Negative  / Bili: Negative / Urobili: Negative   Blood: x / Protein: 30 mg/dL / Nitrite: Negative   Leuk Esterase: Negative / RBC: 4 /hpf / WBC 4 /HPF   Sq Epi: x / Non Sq Epi: 1 /hpf / Bacteria: Negative      MICROBIOLOGY:  RECENT CULTURES:   @ 08:59 .Blood Blood-Peripheral     No growth to date.        RADIOLOGY REVIEWED:    
Patient is a 86y old  Female who presents with a chief complaint of Vaginal Bleeding (16 Nov 2021 14:18)      SUBJECTIVE / OVERNIGHT EVENTS: Comfortable  Review of Systems  chest pain no  palpitations no  sob no  nausea no  headache no    MEDICATIONS  (STANDING):  ascorbic acid 500 milliGRAM(s) Oral daily  cefTRIAXone   IVPB 1000 milliGRAM(s) IV Intermittent every 24 hours  citalopram 10 milliGRAM(s) Oral daily  cyanocobalamin 1000 MICROGram(s) Oral daily  donepezil 5 milliGRAM(s) Oral at bedtime  furosemide    Tablet 20 milliGRAM(s) Oral daily  metoprolol succinate ER 25 milliGRAM(s) Oral daily  multivitamin 1 Tablet(s) Oral daily  senna 1 Tablet(s) Oral at bedtime  traZODone 50 milliGRAM(s) Oral at bedtime    MEDICATIONS  (PRN):  acetaminophen     Tablet .. 650 milliGRAM(s) Oral every 6 hours PRN Temp greater or equal to 38C (100.4F), Mild Pain (1 - 3), Moderate Pain (4 - 6), Severe Pain (7 - 10)  risperiDONE   Tablet 0.25 milliGRAM(s) Oral three times a day PRN Agitation      Vital Signs Last 24 Hrs  T(C): 36.7 (16 Nov 2021 14:30), Max: 37.1 (16 Nov 2021 10:23)  T(F): 98 (16 Nov 2021 14:30), Max: 98.8 (16 Nov 2021 10:23)  HR: 69 (16 Nov 2021 14:30) (65 - 89)  BP: 150/68 (16 Nov 2021 17:00) (125/81 - 150/68)  BP(mean): --  RR: 18 (16 Nov 2021 17:00) (18 - 18)  SpO2: 97% (16 Nov 2021 17:00) (95% - 98%)    PHYSICAL EXAM:  GENERAL: NAD, well-developed  HEAD:  Atraumatic, Normocephalic  EYES: EOMI, PERRLA, conjunctiva and sclera clear  NECK: Supple, No JVD  CHEST/LUNG: Clear to auscultation bilaterally; No wheeze  HEART: Regular rate and rhythm; No murmurs, rubs, or gallops  ABDOMEN: Soft, Nontender, Nondistended; Bowel sounds present  EXTREMITIES:  2+ Peripheral Pulses, No clubbing, cyanosis, or edema  PSYCH: confused  NEUROLOGY: non-focal  SKIN: No rashes or lesions    LABS:                        12.6   4.84  )-----------( 176      ( 15 Nov 2021 07:53 )             39.0     11-15    141  |  105  |  12  ----------------------------<  85  4.0   |  22  |  0.57    Ca    9.3      15 Nov 2021 07:53                Culture - Urine (collected 14 Nov 2021 12:35)  Source: Catheterized Catheterized  Preliminary Report (16 Nov 2021 12:39):    >100,000 CFU/ml Klebsiella pneumoniae        RADIOLOGY & ADDITIONAL TESTS:    Imaging Personally Reviewed:    Consultant(s) Notes Reviewed:      Care Discussed with Consultants/Other Providers:  
Patient is a 86y old  Female who presents with a chief complaint of Vaginal Bleeding (17 Nov 2021 11:47)      SUBJECTIVE / OVERNIGHT EVENTS: Comfortable   Review of Systems  unobtainable     MEDICATIONS  (STANDING):  ascorbic acid 500 milliGRAM(s) Oral daily  cefTRIAXone   IVPB 1000 milliGRAM(s) IV Intermittent every 24 hours  citalopram 10 milliGRAM(s) Oral daily  cyanocobalamin 1000 MICROGram(s) Oral daily  donepezil 5 milliGRAM(s) Oral at bedtime  furosemide    Tablet 20 milliGRAM(s) Oral daily  metoprolol succinate ER 25 milliGRAM(s) Oral daily  multivitamin 1 Tablet(s) Oral daily  senna 1 Tablet(s) Oral at bedtime  traZODone 50 milliGRAM(s) Oral at bedtime    MEDICATIONS  (PRN):  acetaminophen     Tablet .. 650 milliGRAM(s) Oral every 6 hours PRN Temp greater or equal to 38C (100.4F), Mild Pain (1 - 3), Moderate Pain (4 - 6), Severe Pain (7 - 10)  risperiDONE   Tablet 0.25 milliGRAM(s) Oral three times a day PRN Agitation      Vital Signs Last 24 Hrs  T(C): 36.6 (17 Nov 2021 13:01), Max: 37.4 (16 Nov 2021 21:10)  T(F): 97.9 (17 Nov 2021 13:01), Max: 99.4 (17 Nov 2021 10:10)  HR: 74 (17 Nov 2021 14:49) (72 - 115)  BP: 133/72 (17 Nov 2021 14:49) (116/78 - 144/76)  BP(mean): --  RR: 18 (17 Nov 2021 14:49) (18 - 20)  SpO2: 97% (17 Nov 2021 14:49) (93% - 97%)    PHYSICAL EXAM:  GENERAL: NAD  HEAD:  Atraumatic, Normocephalic  EYES: EOMI, PERRLA, conjunctiva and sclera clear  NECK: Supple, No JVD  CHEST/LUNG: Clear to auscultation bilaterally; No wheeze  HEART: Regular rate and rhythm; No murmurs, rubs, or gallops  ABDOMEN: Soft, Nontender, Nondistended; Bowel sounds present  EXTREMITIES:  2+ Peripheral Pulses, No clubbing, cyanosis, or edema  PSYCH: confused   NEUROLOGY: non-focal  SKIN: No rashes or lesions    LABS:                        13.7   7.74  )-----------( 213      ( 17 Nov 2021 06:33 )             41.4     11-17    142  |  106  |  27<H>  ----------------------------<  112<H>  3.7   |  22  |  0.79    Ca    9.2      17 Nov 2021 06:33                  RADIOLOGY & ADDITIONAL TESTS:    Imaging Personally Reviewed:    Consultant(s) Notes Reviewed:      Care Discussed with Consultants/Other Providers:  
Patient is a 86y old  Female who presents with a chief complaint of Vaginal Bleeding (18 Nov 2021 10:57)      SUBJECTIVE / OVERNIGHT EVENTS: had fever.   Review of Systems  chest pain no  palpitations no  sob no  nausea no  headache no    MEDICATIONS  (STANDING):  ascorbic acid 500 milliGRAM(s) Oral daily  cefTRIAXone   IVPB 1000 milliGRAM(s) IV Intermittent every 24 hours  citalopram 10 milliGRAM(s) Oral daily  cyanocobalamin 1000 MICROGram(s) Oral daily  donepezil 5 milliGRAM(s) Oral at bedtime  furosemide    Tablet 20 milliGRAM(s) Oral daily  metoprolol succinate ER 25 milliGRAM(s) Oral daily  multivitamin 1 Tablet(s) Oral daily  senna 1 Tablet(s) Oral at bedtime  traZODone 50 milliGRAM(s) Oral at bedtime    MEDICATIONS  (PRN):  acetaminophen     Tablet .. 650 milliGRAM(s) Oral every 6 hours PRN Temp greater or equal to 38C (100.4F), Mild Pain (1 - 3), Moderate Pain (4 - 6), Severe Pain (7 - 10)  risperiDONE   Tablet 0.25 milliGRAM(s) Oral three times a day PRN Agitation      Vital Signs Last 24 Hrs  T(C): 36.3 (19 Nov 2021 09:00), Max: 38.2 (18 Nov 2021 22:13)  T(F): 97.3 (19 Nov 2021 09:00), Max: 100.8 (18 Nov 2021 22:13)  HR: 81 (19 Nov 2021 09:00) (81 - 113)  BP: 126/81 (19 Nov 2021 09:00) (119/80 - 126/81)  BP(mean): --  RR: 17 (19 Nov 2021 09:00) (17 - 18)  SpO2: 96% (19 Nov 2021 09:00) (93% - 96%)    PHYSICAL EXAM:  GENERAL: NAD, well-developed  HEAD:  Atraumatic, Normocephalic  EYES: EOMI, PERRLA, conjunctiva and sclera clear  NECK: Supple, No JVD  CHEST/LUNG: Clear to auscultation bilaterally; No wheeze  HEART: Regular rate and rhythm; No murmurs, rubs, or gallops  ABDOMEN: Soft, Nontender, Nondistended; Bowel sounds present  EXTREMITIES:  2+ Peripheral Pulses, No clubbing, cyanosis, or edema  PSYCH: confused  NEUROLOGY: non-focal  SKIN: No rashes or lesions    LABS:                        13.6   8.40  )-----------( 215      ( 19 Nov 2021 07:01 )             42.1     11-19    139  |  104  |  22  ----------------------------<  108<H>  3.6   |  23  |  0.66    Ca    8.9      19 Nov 2021 07:05  Mg     2.2     11-19                  RADIOLOGY & ADDITIONAL TESTS:    Imaging Personally Reviewed:  < from: CT Abdomen and Pelvis w/ IV Cont (11.18.21 @ 16:36) >  IMPRESSION:  Streak artifact from patient's bilateral hip prostheses limits evaluation of the pelvis. A previously noted endometrial lesion/polyp on ultrasound is difficult to evaluate on CT.    Large hiatal hernia containing stomach and portion of transverse colon and pancreas.    --- End of Report ---    < end of copied text >    Consultant(s) Notes Reviewed:      Care Discussed with Consultants/Other Providers:  
Patient is a 86y old  Female who presents with a chief complaint of Vaginal Bleeding (2021 11:20)      SUBJECTIVE / OVERNIGHT EVENTS: Comfortable without new complaints.   Review of Systems  chest pain no  palpitations no  sob no  nausea no  headache no    MEDICATIONS  (STANDING):  ascorbic acid 500 milliGRAM(s) Oral daily  cefTRIAXone   IVPB 1000 milliGRAM(s) IV Intermittent every 24 hours  citalopram 10 milliGRAM(s) Oral daily  cyanocobalamin 1000 MICROGram(s) Oral daily  donepezil 5 milliGRAM(s) Oral at bedtime  furosemide    Tablet 20 milliGRAM(s) Oral daily  metoprolol succinate ER 25 milliGRAM(s) Oral daily  multivitamin 1 Tablet(s) Oral daily  senna 1 Tablet(s) Oral at bedtime  traZODone 50 milliGRAM(s) Oral at bedtime    MEDICATIONS  (PRN):  acetaminophen     Tablet .. 650 milliGRAM(s) Oral every 6 hours PRN Temp greater or equal to 38C (100.4F), Mild Pain (1 - 3), Moderate Pain (4 - 6), Severe Pain (7 - 10)  risperiDONE   Tablet 0.25 milliGRAM(s) Oral three times a day PRN Agitation      Vital Signs Last 24 Hrs  T(C): 37.1 (2021 12:47), Max: 37.1 (2021 12:47)  T(F): 98.8 (2021 12:47), Max: 98.8 (2021 12:47)  HR: 102 (2021 12:47) (100 - 109)  BP: 124/74 (2021 12:47) (103/66 - 132/81)  BP(mean): --  RR: 20 (2021 12:47) (18 - 20)  SpO2: 94% (2021 12:47) (94% - 96%)    PHYSICAL EXAM:  GENERAL: NAD, well-developed  HEAD:  Atraumatic, Normocephalic  EYES: EOMI, PERRLA, conjunctiva and sclera clear  NECK: Supple, No JVD  CHEST/LUNG: Clear to auscultation bilaterally; No wheeze  HEART: Regular rate and rhythm; No murmurs, rubs, or gallops  ABDOMEN: Soft, Nontender, Nondistended; Bowel sounds present  EXTREMITIES:  2+ Peripheral Pulses, No clubbing, cyanosis, or edema  PSYCH: confused  NEUROLOGY: non-focal  SKIN: No rashes or lesions    LABS:                        13.3   8.25  )-----------( 194      ( 2021 06:21 )             40.8     11-20    141  |  106  |  29<H>  ----------------------------<  111<H>  3.7   |  23  |  0.70    Ca    8.9      2021 06:21  Mg     2.1                 Urinalysis Basic - ( 2021 16:03 )    Color: Yellow / Appearance: Slightly Turbid / S.040 / pH: x  Gluc: x / Ketone: Negative  / Bili: Negative / Urobili: Negative   Blood: x / Protein: 30 mg/dL / Nitrite: Negative   Leuk Esterase: Negative / RBC: 4 /hpf / WBC 4 /HPF   Sq Epi: x / Non Sq Epi: 1 /hpf / Bacteria: Negative        Culture - Blood (collected 2021 08:59)  Source: .Blood Blood-Peripheral  Preliminary Report (2021 09:01):    No growth to date.    Culture - Blood (collected 2021 08:59)  Source: .Blood Blood-Peripheral  Preliminary Report (2021 09:01):    No growth to date.        RADIOLOGY & ADDITIONAL TESTS:    Imaging Personally Reviewed:    Consultant(s) Notes Reviewed:      Care Discussed with Consultants/Other Providers:  
Patient is a 86y old  Female who presents with a chief complaint of Vaginal Bleeding (21 Nov 2021 11:46)      SUBJECTIVE / OVERNIGHT EVENTS: No new complaints.   Review of Systems  chest pain no  palpitations no  sob no  nausea no  headache no    MEDICATIONS  (STANDING):  ascorbic acid 500 milliGRAM(s) Oral daily  cefTRIAXone   IVPB 1000 milliGRAM(s) IV Intermittent every 24 hours  citalopram 10 milliGRAM(s) Oral daily  cyanocobalamin 1000 MICROGram(s) Oral daily  donepezil 5 milliGRAM(s) Oral at bedtime  furosemide    Tablet 20 milliGRAM(s) Oral daily  metoprolol succinate ER 25 milliGRAM(s) Oral daily  multivitamin 1 Tablet(s) Oral daily  senna 1 Tablet(s) Oral at bedtime  traZODone 50 milliGRAM(s) Oral at bedtime    MEDICATIONS  (PRN):  acetaminophen     Tablet .. 650 milliGRAM(s) Oral every 6 hours PRN Temp greater or equal to 38C (100.4F), Mild Pain (1 - 3), Moderate Pain (4 - 6), Severe Pain (7 - 10)  risperiDONE   Tablet 0.25 milliGRAM(s) Oral three times a day PRN Agitation      Vital Signs Last 24 Hrs  T(C): 36.8 (21 Nov 2021 14:22), Max: 37.6 (20 Nov 2021 20:58)  T(F): 98.2 (21 Nov 2021 14:22), Max: 99.6 (20 Nov 2021 20:58)  HR: 68 (21 Nov 2021 14:22) (68 - 90)  BP: 128/73 (21 Nov 2021 14:22) (123/80 - 128/73)  BP(mean): --  RR: 20 (21 Nov 2021 14:22) (18 - 20)  SpO2: 99% (21 Nov 2021 14:22) (94% - 99%)    PHYSICAL EXAM:  GENERAL: NAD, well-developed  HEAD:  Atraumatic, Normocephalic  EYES: EOMI, PERRLA, conjunctiva and sclera clear  NECK: Supple, No JVD  CHEST/LUNG: Clear to auscultation bilaterally; No wheeze  HEART: Regular rate and rhythm; No murmurs, rubs, or gallops  ABDOMEN: Soft, Nontender, Nondistended; Bowel sounds present  EXTREMITIES:  2+ Peripheral Pulses, No clubbing, cyanosis, or edema  PSYCH: confused  NEUROLOGY: non-focal  SKIN: No rashes or lesions    LABS:                        13.3   8.25  )-----------( 194      ( 20 Nov 2021 06:21 )             40.8     11-20    141  |  106  |  29<H>  ----------------------------<  111<H>  3.7   |  23  |  0.70    Ca    8.9      20 Nov 2021 06:21  Mg     2.1     11-20                Culture - Blood (collected 19 Nov 2021 08:59)  Source: .Blood Blood-Peripheral  Preliminary Report (20 Nov 2021 09:01):    No growth to date.    Culture - Blood (collected 19 Nov 2021 08:59)  Source: .Blood Blood-Peripheral  Preliminary Report (20 Nov 2021 09:01):    No growth to date.        RADIOLOGY & ADDITIONAL TESTS:    Imaging Personally Reviewed:    Consultant(s) Notes Reviewed:      Care Discussed with Consultants/Other Providers:

## 2021-11-24 NOTE — DISCHARGE NOTE NURSING/CASE MANAGEMENT/SOCIAL WORK - PATIENT PORTAL LINK FT
You can access the FollowMyHealth Patient Portal offered by [facility] by registering at the following website: [email or web address]. By joining [facility]’s FollowMyHealth portal, you will also be able to view your health information using other applications (apps) compatible with our system.

## 2021-11-24 NOTE — DISCHARGE NOTE PROVIDER - PROVIDER TOKENS
FREE:[LAST:[Follow up],PHONE:[(   )    -],FAX:[(   )    -],ADDRESS:[with your Primary Care provider and Hospice for continued care]]

## 2021-11-24 NOTE — DISCHARGE NOTE PROVIDER - NSDCCPCAREPLAN_GEN_ALL_CORE_FT
PRINCIPAL DISCHARGE DIAGNOSIS  Diagnosis: Vaginal bleeding  Assessment and Plan of Treatment: Monitor for continued bleeding.  If bleeding starts to saturate a pad, seek medical attention if within the gooals of care.      SECONDARY DISCHARGE DIAGNOSES  Diagnosis: Vaginal bleeding  Assessment and Plan of Treatment:

## 2021-11-24 NOTE — PROGRESS NOTE ADULT - REASON FOR ADMISSION
Vaginal Bleeding

## 2021-11-24 NOTE — DISCHARGE NOTE PROVIDER - CARE PROVIDER_API CALL
Follow up,   with your Primary Care provider and Hospice for continued care  Phone: (   )    -  Fax: (   )    -  Follow Up Time:

## 2021-11-24 NOTE — DISCHARGE NOTE PROVIDER - HOSPITAL COURSE
86F with PMHx of HTN and Alzheimer's Dementia sent in from [facility] for vaginal bleeding. It had been going on for several weeks. Patient herself has no complaints.   She was evaluated for the Vaginal bleed and no intervention was recommended.  Conservative management and monitoring at this time.    She was found to have a Klebsiella UTI which was treated with CTX.   She has a Sacral wound which can be treated as an outpatient.   She has a history of Agitation and Dementia .  She was continued on her Trazadone, Risperdal, Donepezil and closely monitored.    Hospice care was decided and she will return back to [facility] with hospice care today.

## 2021-11-24 NOTE — DISCHARGE NOTE PROVIDER - NSDCMRMEDTOKEN_GEN_ALL_CORE_FT
acetaminophen 325 mg oral tablet: 2 tab(s) orally every 6 hours, As needed, Temp greater or equal to 38C (100.4F), Mild Pain (1 - 3), Moderate Pain (4 - 6), Severe Pain (7 - 10)  Bedside Cammode: One Bedside Cammode  citalopram 10 mg oral tablet: 1 tab(s) orally once a day  furosemide 20 mg oral tablet: 1 tab(s) orally once a day  Hospital  Bed: One Hospital  Bed  Oxygen: Oxygen 2L PRN  risperiDONE 0.25 mg oral tablet: 1 tab(s) orally 3 times a day, As needed, Agitation  senna oral tablet: 1 tab(s) orally once a day (at bedtime)  traZODone 50 mg oral tablet: 1 tab(s) orally once a day (at bedtime)

## 2021-11-24 NOTE — PROGRESS NOTE ADULT - PROVIDER SPECIALTY LIST ADULT
Infectious Disease
Internal Medicine
Internal Medicine
Infectious Disease
Internal Medicine
Infectious Disease
Internal Medicine
Infectious Disease
Internal Medicine

## 2021-11-24 NOTE — PROGRESS NOTE ADULT - ASSESSMENT
86F with PMHx of HTN and Alzheimer's Dementia sent in from [facility] for post-menopausal vaginal bleeding.    Post-menopausal Vaginal Bleeding  - Monitor Hg  - Holding baby aspirin   - no further work up    Alzheimer's Dementia  - Continue with Donepezil  - Continue with Risperdal PRN & Celexa  - Trazodone    HTN  - Lasix & Metoprolol    UTI  - UC Klebsiella  - Ceftriaxone  - ID follow    DVT prophylaxis  - PAS    DNR    Diomedes Gale MD pager [phone] 
86F with PMHx of HTN and Alzheimer's Dementia sent in from [facility] for post-menopausal vaginal bleeding.    Post-menopausal Vaginal Bleeding  - CT abdomen/ pelvis pending   - Monitor Hg  - Holding baby aspirin     Alzheimer's Dementia  - Continue with Donepezil  - Continue with Risperdal PRN & Celexa  - Trazodone    HTN  - Lasix & Metoprolol    UTI  - UC Klebsiella  - Ceftriaxone  - ID follow    DVT prophylaxis  - PAS    GOC to be addressed     Diomedes Gale MD pager [phone] 
86y female with a PMH significant for advanced Alzheimer's dementia & arthritis s/p left knee & right hip arthroplasty, sent in from [facility] for vaginal bleeding. Vaginal scan & transvaginal US attempted in ER, but patient became agitated and refused, requiring Haldol to calm down.  Has no fevers or leukocytosis.   UA reveals large blood, 419 RBCs, only 3 WBCs, + nitrites & no LE.   Urine cx with Klebsiella.   Remains afebrile and without support for sepsis  UTI treated   Transvaginal ultrasound revealed mildly distended endometrium with fluid and an 1.7 cm avascular echogenic focus at the lower uterine segment, possibly a polyp or blood product  Gyn input appreciated - recommended GOC discussion prior to a possible endometrial biopsy  CT A & P a large hiatal hernia  Antibiotics were stopped on 11/17/21 - then at night of 11/18/21 with recurrent fever with tachycardia  This morning afebrile, cx repeated & Ceftriaxone was resumed  Exam remains unchanged & pt not answering any questions but in NAD & follows simple commands of take deep breaths   ? if fevers are in response to DTI from incontinence dermatitis - seen by wound care NP input appreciated    PLAN:  Continue empiric Ceftriaxone  Follow repeated blood cx    Continue supportive care per primary team
86y female with a PMH significant for advanced Alzheimer's dementia & arthritis s/p left knee & right hip arthroplasty, sent in from [facility] for vaginal bleeding. Vaginal scan & transvaginal US attempted in ER, but patient became agitated and refused, requiring Haldol to calm down.  Has no fevers or leukocytosis.   UA reveals large blood, 419 RBCs, only 3 WBCs, + nitrites & no LE.   Urine cx with Klebsiella.   vitals reviewed she is afebrile and wbc wnl     PLAN:  Continue Ceftriaxone for Klebsiella UTI, SS are pending , follow up final report   continue supportive care per primary team
86y female with a PMH significant for advanced Alzheimer's dementia & arthritis s/p left knee & right hip arthroplasty, sent in from [facility] for vaginal bleeding. Vaginal scan & transvaginal US attempted in ER, but patient became agitated and refused, requiring Haldol to calm down.  Has no fevers or leukocytosis.   UA reveals large blood, 419 RBCs, only 3 WBCs, + nitrites & no LE.   Urine cx with Klebsiella.   Remains afebrile and without support for sepsis  UTI treated   Transvaginal ultrasound revealed mildly distended endometrium with fluid and an 1.7 cm avascular echogenic focus at the lower uterine segment, possibly a polyp or blood product  Gyn input appreciated - recommended GOC discussion prior to a possible endometrial biopsy  CT A & P a large hiatal hernia  Antibiotics were stopped on 11/17/21 - then at night of 11/18/21 with recurrent fever of 100.8 with tachycardia  Cx repeated & Ceftriaxone was resumed  Exam remains unchanged & pt minimally interactive  No source of low grade temp so far identified  Nurse spoken with - no vaginal bleed since admitted.     PLAN:  Continue empiric Ceftriaxone - D #6/7  Follow repeated blood cx    
86F with PMHx of HTN and Alzheimer's Dementia sent in from [facility] for post-menopausal vaginal bleeding.    Post-menopausal Vaginal Bleeding  - CT abdomen/ pelvis pending   - Monitor Hg  - Holding baby aspirin     Alzheimer's Dementia  - Continue with Donepezil  - Continue with Risperdal PRN & Celexa  - Trazodone    HTN  - Lasix & Metoprolol    UTI  - UC  - Ceftriaxone  - ID evaluation    DVT prophylaxis  - PAS    GOC to be addressed     Diomedes Gale MD pager [phone] 
86F with PMHx of HTN and Alzheimer's Dementia sent in from [facility] for post-menopausal vaginal bleeding.    Post-menopausal Vaginal Bleeding  - Monitor Hg  - Holding baby aspirin   - no further work up    Alzheimer's Dementia  - Continue with Donepezil  - Continue with Risperdal PRN & Celexa  - Trazodone    HTN  - Lasix & Metoprolol    UTI  - UC Klebsiella  - Ceftriaxone 6/7  - ID follow    DVT prophylaxis  - PAS    DNR    DCP in progress    Diomedes Gale MD pager [phone] 
86F with PMHx of HTN and Alzheimer's Dementia sent in from [facility] for post-menopausal vaginal bleeding.    Post-menopausal Vaginal Bleeding  - CT abdomen/ pelvis pending   - Monitor Hg  - Holding baby aspirin     Alzheimer's Dementia  - Continue with Donepezil  - Continue with Risperdal PRN & Celexa  - Trazodone    HTN  - Lasix & Metoprolol    UTI  - UC  - Ceftriaxone    DVT prophylaxis  - PAS    Further action as per clinical course     d/w family at bedside    Diomedes Gale MD pager [phone] 
86F with PMHx of HTN and Alzheimer's Dementia sent in from [facility] for post-menopausal vaginal bleeding.    Post-menopausal Vaginal Bleeding resolved  - Monitor Hg  - Holding baby aspirin   - no further work up    Alzheimer's Dementia  - Continue with Donepezil  - Continue with Risperdal PRN & Celexa  - Trazodone    HTN  - Lasix & Metoprolol    UTI  - UC Klebsiella  - s/p Ceftriaxone 7/7  - ID follow    DVT prophylaxis  - PAS    DNR    DCP in progress with Hospice care.       Diomedes Gale MD pager [phone] 
86F with PMHx of HTN and Alzheimer's Dementia sent in from [facility] for post-menopausal vaginal bleeding.    Post-menopausal Vaginal Bleeding  - Monitor Hg  - Holding baby aspirin   - no further work up    Alzheimer's Dementia  - Continue with Donepezil  - Continue with Risperdal PRN & Celexa  - Trazodone    HTN  - Lasix & Metoprolol    UTI  - UC Klebsiella  - Ceftriaxone  - ID follow    DVT prophylaxis  - PAS    DNR    Diomedes Gale MD pager [phone] 
86y female with a PMH significant for advanced Alzheimer's dementia & arthritis s/p left knee & right hip arthroplasty, sent in from [facility] for vaginal bleeding. Vaginal scan & transvaginal US attempted in ER, but patient became agitated and refused, requiring Haldol to calm down.  Has no fevers or leukocytosis.   UA reveals large blood, 419 RBCs, only 3 WBCs, + nitrites & no LE.   Urine cx with Klebsiella.   Remains afebrile and without support for sepsis  UTI treated   Transvaginal ultrasound revealed mildly distended endometrium with fluid and an 1.7 cm avascular echogenic focus at the lower uterine segment, possibly a polyp or blood product  Gyn input appreciated - recommended GOC discussion prior to a possible endometrial biopsy  CT A & P a large hiatal hernia  Antibiotics were stopped on 11/17/21 - then at night of 11/18/21 with recurrent fever of 100.8 with tachycardia  Cx repeated & Ceftriaxone was resumed  Exam remains unchanged & pt minimally interactive  No source of low grade temp so far identified  Nurse spoken with - no vaginal bleed since admitted.     PLAN:  Continue empiric Ceftriaxone - D # 5  Follow repeated blood cx    Follow clinically     
86F with PMHx of HTN and Alzheimer's Dementia sent in from [facility] for post-menopausal vaginal bleeding.    Post-menopausal Vaginal Bleeding  - Monitor Hg  - Holding baby aspirin   - no further work up    Alzheimer's Dementia  - Continue with Donepezil  - Continue with Risperdal PRN & Celexa  - Trazodone    HTN  - Lasix & Metoprolol    UTI  - UC Klebsiella  - Ceftriaxone 7/7  - ID follow    DVT prophylaxis  - PAS    DNR    DCP in progress. Hospice evaluation requested by family.     Diomedes Gale MD pager [phone] 
86F with PMHx of HTN and Alzheimer's Dementia sent in from [facility] for post-menopausal vaginal bleeding.    Post-menopausal Vaginal Bleeding  - Monitor Hg  - Holding baby aspirin     Alzheimer's Dementia  - Continue with Donepezil  - Continue with Risperdal PRN & Celexa  - Trazodone    HTN  - Lasix & Metoprolol    UTI  - UC Klebsiella  - Ceftriaxone  - ID follow    DVT prophylaxis  - PAS    DNR    Diomedes Gale MD pager [phone] 
86F with PMHx of HTN and Alzheimer's Dementia sent in from [facility] for post-menopausal vaginal bleeding.    Post-menopausal Vaginal Bleeding resolved  - Monitor Hg  - Holding baby aspirin   - no further work up    Alzheimer's Dementia  - Continue with Donepezil  - Continue with Risperdal PRN & Celexa  - Trazodone    HTN  - Lasix & Metoprolol    UTI  - UC Klebsiella  - s/p Ceftriaxone 7/7  - ID follow    DVT prophylaxis  - PAS    DNR    DCP in progress. Hospice evaluation requested by family.      Diomedes Gale MD pager [phone] 
86y female with a PMH significant for advanced Alzheimer's dementia & arthritis s/p left knee & right hip arthroplasty, sent in from [facility] for vaginal bleeding. Vaginal scan & transvaginal US attempted in ER, but patient became agitated and refused, requiring Haldol to calm down.  Has no fevers or leukocytosis.   UA reveals large blood, 419 RBCs, only 3 WBCs, + nitrites & no LE.   Urine cx with Klebsiella.   Remains afebrile and without support for sepsis  UTI treated   Gyn input appreciated    PLAN:  Completed Ceftriaxone for Klebsiella UTI   Continue supportive care per primary team  No active ID issues at this point, will not follow actively, please reconsult with questions or if the status changes

## 2022-01-19 ENCOUNTER — EMERGENCY (EMERGENCY)
Facility: HOSPITAL | Age: 87
LOS: 1 days | Discharge: HOME | End: 2022-01-19
Attending: EMERGENCY MEDICINE
Payer: MEDICARE

## 2022-01-19 VITALS
OXYGEN SATURATION: 96 % | DIASTOLIC BLOOD PRESSURE: 62 MMHG | SYSTOLIC BLOOD PRESSURE: 131 MMHG | RESPIRATION RATE: 17 BRPM | TEMPERATURE: 99 F | HEART RATE: 82 BPM

## 2022-01-19 VITALS
OXYGEN SATURATION: 95 % | HEART RATE: 88 BPM | DIASTOLIC BLOOD PRESSURE: 80 MMHG | SYSTOLIC BLOOD PRESSURE: 131 MMHG | HEIGHT: 66 IN | RESPIRATION RATE: 16 BRPM

## 2022-01-19 DIAGNOSIS — Z96.641 PRESENCE OF RIGHT ARTIFICIAL HIP JOINT: Chronic | ICD-10-CM

## 2022-01-19 DIAGNOSIS — Z96.652 PRESENCE OF LEFT ARTIFICIAL KNEE JOINT: Chronic | ICD-10-CM

## 2022-01-19 LAB
ALBUMIN SERPL ELPH-MCNC: 3.9 G/DL — SIGNIFICANT CHANGE UP (ref 3.3–5)
ALP SERPL-CCNC: 99 U/L — SIGNIFICANT CHANGE UP (ref 40–120)
ALT FLD-CCNC: 20 U/L — SIGNIFICANT CHANGE UP (ref 10–45)
ANION GAP SERPL CALC-SCNC: 11 MMOL/L — SIGNIFICANT CHANGE UP (ref 5–17)
APPEARANCE UR: CLEAR — SIGNIFICANT CHANGE UP
APTT BLD: 29.3 SEC — SIGNIFICANT CHANGE UP (ref 27.5–35.5)
AST SERPL-CCNC: 13 U/L — SIGNIFICANT CHANGE UP (ref 10–40)
BACTERIA # UR AUTO: NEGATIVE — SIGNIFICANT CHANGE UP
BASE EXCESS BLDV CALC-SCNC: 3 MMOL/L — HIGH (ref -2–2)
BASOPHILS # BLD AUTO: 0.01 K/UL — SIGNIFICANT CHANGE UP (ref 0–0.2)
BASOPHILS NFR BLD AUTO: 0.2 % — SIGNIFICANT CHANGE UP (ref 0–2)
BILIRUB SERPL-MCNC: 0.6 MG/DL — SIGNIFICANT CHANGE UP (ref 0.2–1.2)
BILIRUB UR-MCNC: NEGATIVE — SIGNIFICANT CHANGE UP
BUN SERPL-MCNC: 14 MG/DL — SIGNIFICANT CHANGE UP (ref 7–23)
CA-I SERPL-SCNC: 1.22 MMOL/L — SIGNIFICANT CHANGE UP (ref 1.15–1.33)
CALCIUM SERPL-MCNC: 9.2 MG/DL — SIGNIFICANT CHANGE UP (ref 8.4–10.5)
CHLORIDE BLDV-SCNC: 100 MMOL/L — SIGNIFICANT CHANGE UP (ref 96–108)
CHLORIDE SERPL-SCNC: 101 MMOL/L — SIGNIFICANT CHANGE UP (ref 96–108)
CK SERPL-CCNC: 280 U/L — HIGH (ref 25–170)
CO2 BLDV-SCNC: 29 MMOL/L — HIGH (ref 22–26)
CO2 SERPL-SCNC: 24 MMOL/L — SIGNIFICANT CHANGE UP (ref 22–31)
COLOR SPEC: YELLOW — SIGNIFICANT CHANGE UP
CREAT SERPL-MCNC: 0.64 MG/DL — SIGNIFICANT CHANGE UP (ref 0.5–1.3)
DIFF PNL FLD: NEGATIVE — SIGNIFICANT CHANGE UP
EOSINOPHIL # BLD AUTO: 0 K/UL — SIGNIFICANT CHANGE UP (ref 0–0.5)
EOSINOPHIL NFR BLD AUTO: 0 % — SIGNIFICANT CHANGE UP (ref 0–6)
EPI CELLS # UR: 1 /HPF — SIGNIFICANT CHANGE UP
GAS PNL BLDV: 134 MMOL/L — LOW (ref 136–145)
GAS PNL BLDV: SIGNIFICANT CHANGE UP
GLUCOSE BLDV-MCNC: 129 MG/DL — HIGH (ref 70–99)
GLUCOSE SERPL-MCNC: 136 MG/DL — HIGH (ref 70–99)
GLUCOSE UR QL: NEGATIVE — SIGNIFICANT CHANGE UP
HCO3 BLDV-SCNC: 28 MMOL/L — SIGNIFICANT CHANGE UP (ref 22–29)
HCT VFR BLD CALC: 38.3 % — SIGNIFICANT CHANGE UP (ref 34.5–45)
HCT VFR BLDA CALC: 38 % — SIGNIFICANT CHANGE UP (ref 34.5–46.5)
HGB BLD CALC-MCNC: 12.7 G/DL — SIGNIFICANT CHANGE UP (ref 11.7–16.1)
HGB BLD-MCNC: 12.3 G/DL — SIGNIFICANT CHANGE UP (ref 11.5–15.5)
HYALINE CASTS # UR AUTO: 1 /LPF — SIGNIFICANT CHANGE UP (ref 0–2)
IMM GRANULOCYTES NFR BLD AUTO: 0.5 % — SIGNIFICANT CHANGE UP (ref 0–1.5)
INR BLD: 1.14 RATIO — SIGNIFICANT CHANGE UP (ref 0.88–1.16)
KETONES UR-MCNC: ABNORMAL
LACTATE BLDV-MCNC: 1.7 MMOL/L — SIGNIFICANT CHANGE UP (ref 0.7–2)
LEUKOCYTE ESTERASE UR-ACNC: NEGATIVE — SIGNIFICANT CHANGE UP
LYMPHOCYTES # BLD AUTO: 0.39 K/UL — LOW (ref 1–3.3)
LYMPHOCYTES # BLD AUTO: 6 % — LOW (ref 13–44)
MCHC RBC-ENTMCNC: 30.5 PG — SIGNIFICANT CHANGE UP (ref 27–34)
MCHC RBC-ENTMCNC: 32.1 GM/DL — SIGNIFICANT CHANGE UP (ref 32–36)
MCV RBC AUTO: 95 FL — SIGNIFICANT CHANGE UP (ref 80–100)
MONOCYTES # BLD AUTO: 0.38 K/UL — SIGNIFICANT CHANGE UP (ref 0–0.9)
MONOCYTES NFR BLD AUTO: 5.8 % — SIGNIFICANT CHANGE UP (ref 2–14)
NEUTROPHILS # BLD AUTO: 5.74 K/UL — SIGNIFICANT CHANGE UP (ref 1.8–7.4)
NEUTROPHILS NFR BLD AUTO: 87.5 % — HIGH (ref 43–77)
NITRITE UR-MCNC: NEGATIVE — SIGNIFICANT CHANGE UP
NRBC # BLD: 0 /100 WBCS — SIGNIFICANT CHANGE UP (ref 0–0)
PCO2 BLDV: 43 MMHG — HIGH (ref 39–42)
PH BLDV: 7.42 — SIGNIFICANT CHANGE UP (ref 7.32–7.43)
PH UR: 7.5 — SIGNIFICANT CHANGE UP (ref 5–8)
PLATELET # BLD AUTO: 158 K/UL — SIGNIFICANT CHANGE UP (ref 150–400)
PO2 BLDV: 32 MMHG — SIGNIFICANT CHANGE UP (ref 25–45)
POTASSIUM BLDV-SCNC: 4.1 MMOL/L — SIGNIFICANT CHANGE UP (ref 3.5–5.1)
POTASSIUM SERPL-MCNC: 4.2 MMOL/L — SIGNIFICANT CHANGE UP (ref 3.5–5.3)
POTASSIUM SERPL-SCNC: 4.2 MMOL/L — SIGNIFICANT CHANGE UP (ref 3.5–5.3)
PROT SERPL-MCNC: 6.7 G/DL — SIGNIFICANT CHANGE UP (ref 6–8.3)
PROT UR-MCNC: ABNORMAL
PROTHROM AB SERPL-ACNC: 13.6 SEC — SIGNIFICANT CHANGE UP (ref 10.6–13.6)
RBC # BLD: 4.03 M/UL — SIGNIFICANT CHANGE UP (ref 3.8–5.2)
RBC # FLD: 14.1 % — SIGNIFICANT CHANGE UP (ref 10.3–14.5)
RBC CASTS # UR COMP ASSIST: 12 /HPF — HIGH (ref 0–4)
SAO2 % BLDV: 47.1 % — LOW (ref 67–88)
SODIUM SERPL-SCNC: 136 MMOL/L — SIGNIFICANT CHANGE UP (ref 135–145)
SP GR SPEC: 1.02 — SIGNIFICANT CHANGE UP (ref 1.01–1.02)
TROPONIN T, HIGH SENSITIVITY RESULT: 14 NG/L — SIGNIFICANT CHANGE UP (ref 0–51)
UROBILINOGEN FLD QL: NEGATIVE — SIGNIFICANT CHANGE UP
WBC # BLD: 6.55 K/UL — SIGNIFICANT CHANGE UP (ref 3.8–10.5)
WBC # FLD AUTO: 6.55 K/UL — SIGNIFICANT CHANGE UP (ref 3.8–10.5)
WBC UR QL: 0 /HPF — SIGNIFICANT CHANGE UP (ref 0–5)

## 2022-01-19 PROCEDURE — 82550 ASSAY OF CK (CPK): CPT

## 2022-01-19 PROCEDURE — 85014 HEMATOCRIT: CPT

## 2022-01-19 PROCEDURE — 93005 ELECTROCARDIOGRAM TRACING: CPT

## 2022-01-19 PROCEDURE — 87086 URINE CULTURE/COLONY COUNT: CPT

## 2022-01-19 PROCEDURE — 80053 COMPREHEN METABOLIC PANEL: CPT

## 2022-01-19 PROCEDURE — 70450 CT HEAD/BRAIN W/O DYE: CPT | Mod: 26,GW

## 2022-01-19 PROCEDURE — 83605 ASSAY OF LACTIC ACID: CPT

## 2022-01-19 PROCEDURE — 99284 EMERGENCY DEPT VISIT MOD MDM: CPT | Mod: 25

## 2022-01-19 PROCEDURE — 85018 HEMOGLOBIN: CPT

## 2022-01-19 PROCEDURE — 72125 CT NECK SPINE W/O DYE: CPT | Mod: 26,GW

## 2022-01-19 PROCEDURE — 99284 EMERGENCY DEPT VISIT MOD MDM: CPT | Mod: GC

## 2022-01-19 PROCEDURE — 72170 X-RAY EXAM OF PELVIS: CPT | Mod: 26

## 2022-01-19 PROCEDURE — 71045 X-RAY EXAM CHEST 1 VIEW: CPT

## 2022-01-19 PROCEDURE — 85025 COMPLETE CBC W/AUTO DIFF WBC: CPT

## 2022-01-19 PROCEDURE — 84295 ASSAY OF SERUM SODIUM: CPT

## 2022-01-19 PROCEDURE — 85730 THROMBOPLASTIN TIME PARTIAL: CPT

## 2022-01-19 PROCEDURE — 82947 ASSAY GLUCOSE BLOOD QUANT: CPT

## 2022-01-19 PROCEDURE — 72125 CT NECK SPINE W/O DYE: CPT | Mod: MA

## 2022-01-19 PROCEDURE — 84132 ASSAY OF SERUM POTASSIUM: CPT

## 2022-01-19 PROCEDURE — 84484 ASSAY OF TROPONIN QUANT: CPT

## 2022-01-19 PROCEDURE — 72170 X-RAY EXAM OF PELVIS: CPT

## 2022-01-19 PROCEDURE — 81001 URINALYSIS AUTO W/SCOPE: CPT

## 2022-01-19 PROCEDURE — 71045 X-RAY EXAM CHEST 1 VIEW: CPT | Mod: 26

## 2022-01-19 PROCEDURE — 82435 ASSAY OF BLOOD CHLORIDE: CPT

## 2022-01-19 PROCEDURE — 82803 BLOOD GASES ANY COMBINATION: CPT

## 2022-01-19 PROCEDURE — 70450 CT HEAD/BRAIN W/O DYE: CPT | Mod: MA

## 2022-01-19 PROCEDURE — 85610 PROTHROMBIN TIME: CPT

## 2022-01-19 PROCEDURE — 82330 ASSAY OF CALCIUM: CPT

## 2022-01-19 NOTE — ED PROVIDER NOTE - PHYSICAL EXAMINATION
Gen - NAD; comfortable appearing but +flat affect; A+Ox1  HEENT - NCAT, EOMI, moist mucous membranes  Neck - supple, no appreciable c-spine tenderness, FROM  Resp - CTAB, no increased WOB  CV -  RRR, no m/r/g  Abd - soft, NT, ND; no guarding or rebound  Back - no midline, paraspinous, or CVA tenderness  MSK - 5/5 strength and FROM b/l UE and LE, no point tenderness appreciated, no obvious deformity  Extrem - no LE edema/erythema/tenderness  Neuro - no focal motor or sensation deficits but limited exam

## 2022-01-19 NOTE — ED ADULT NURSE NOTE - NSIMPLEMENTINTERV_GEN_ALL_ED
Implemented All Fall Risk Interventions:  [place] to call system. Call bell, personal items and telephone within reach. Instruct patient to call for assistance. Room bathroom lighting operational. Non-slip footwear when patient is off stretcher. Physically safe environment: no spills, clutter or unnecessary equipment. Stretcher in lowest position, wheels locked, appropriate side rails in place. Provide visual cue, wrist band, yellow gown, etc. Monitor gait and stability. Monitor for mental status changes and reorient to person, place, and time. Review medications for side effects contributing to fall risk. Reinforce activity limits and safety measures with patient and family.

## 2022-01-19 NOTE — ED PROVIDER NOTE - NSFOLLOWUPINSTRUCTIONS_ED_ALL_ED_FT
Activities as tolerated. Please encourage good oral and fluid intake. For pain, please take Motrin 400mg every 4 hours as needed, or Tylenol 650mg every 6 hours as needed.    Please see your primary care doctor within 24-48 hours for further management of your symptoms.    Please seek emergent medical management if you have any worsening signs or symptoms.

## 2022-01-19 NOTE — ED PROVIDER NOTE - CLINICAL SUMMARY MEDICAL DECISION MAKING FREE TEXT BOX
86 year old female with history dementia (baseline AOx1?) and HTN, BIBEMS from [facility] for ?fall. Vitals wnl. Exam benign with no obvious evidence for traumatic injury but given unreliable history and exam will obtain CT h/cs imaging and chest/pelvix XR in addition to labs/UA to assess for infection vs metabolic derangement. Dispo pending per work up

## 2022-01-19 NOTE — ED ADULT NURSE REASSESSMENT NOTE - NS ED NURSE REASSESS COMMENT FT1
Pt straight cath'd using sterile technique. Second RN present to confirm sterility. 500cc drained. Pt tolerated procedure well.

## 2022-01-19 NOTE — ED PROVIDER NOTE - PATIENT PORTAL LINK FT
You can access the FollowMyHealth Patient Portal offered by [facility] by registering at the following website: [email or web address]. By joining [facility]’s FollowMyHealth portal, you will also be able to view your health information using other applications (apps) compatible with our system.

## 2022-01-19 NOTE — ED PROVIDER NOTE - OBJECTIVE STATEMENT
86 year old female with history dementia (baseline AOx1?) and HTN, BIBEMS from [facility] for ?fall. Per EMS, patient was found on ground this AM. Patient here is an unreliable historian but denies any falls, unable to recount events. Has no complaints here.

## 2022-01-19 NOTE — ED ADULT NURSE REASSESSMENT NOTE - NS ED NURSE REASSESS COMMENT FT1
0921 Pt back to the [place] via [facility] IV d/c and pt had no findings for tests Pt at baseline mental status and report given to [place] by Resident Tadeo

## 2022-01-19 NOTE — ED PROVIDER NOTE - PROGRESS NOTE DETAILS
MINGO Yuen MD  pt signed out to me, pending ct results s/p being found down. ct neg, pt clinically and hd stable, d/w son, son agrees with plan, will set up transportation.

## 2022-01-19 NOTE — ED PROVIDER NOTE - NSICDXPASTMEDICALHX_GEN_ALL_CORE_FT
Triage: Pt arrives ambulatory from home with CC of vomiting since yesterday and some near syncope. She reports she has been feeling lightheaded and lethargic. She reports she has been able to keep food and drink down since yesterday.
PAST MEDICAL HISTORY:  Alzheimer disease

## 2022-01-19 NOTE — ED PROVIDER NOTE - ATTENDING CONTRIBUTION TO CARE
elderly lady w hx dementia presents from assisted living after being found on floor by aid this morning - unclear if pt fell vs syncope vs voluntarily layed there and unclear for time frame. on exam, pt is well appearing, NAD, reportedly at her baseline, stable vitals, no obvious signs of trauma anywhere. due to unreliable history and exam, will do workup to assess for possible acute infectious, metabolic, cardiac or traumatic pathology. will require re-eval after workup to determine dispo.

## 2022-01-19 NOTE — ED ADULT NURSE NOTE - OBJECTIVE STATEMENT
Pt is an 85y/o female A&Ox1 baseline BIBEMS from [facility] s/p unwitnessed fall. As per EMS, staff was doing Pt is an 87y/o female A&Ox1 baseline BIBEMS from [facility] s/p unwitnessed fall. Hx Alzheimer's, baseline confused. Pt stating "I did not fall." As per EMS, staff was doing morning rounds and found pt lying supine on floor next to bed. Unknown how long pt was down for, was conscious when they found her. Pt not c/o any pain. No visible injuries/trauma. As per EMS, [place] staff says pt is acting her baseline. Not on any AC. Pt unable to provide any further history. Denies any chest pain/SOB, does not appear to be in any acute distress. EKG done at bedside, pt placed on cardiac monitor and . Proper fall precautions in place. All needs met. Safety and comfort measures provided. Bed locked and in lowest position, side rails up for safety. Call bell within reach. Pt is an 85y/o female A&Ox2 baseline BIBEMS from [place] s/p unwitnessed fall. Hx Alzheimer's, baseline confused. Pt stating "I did not fall." As per EMS, staff was doing morning rounds and found pt lying supine on floor next to bed. Unknown how long pt was down for, was conscious when they found her. Pt not c/o any pain. No visible injuries/trauma. As per EMS, [place] staff says pt is acting her baseline. Not on any AC. Pt unable to provide any further history. Denies any chest pain/SOB, does not appear to be in any acute distress. EKG done at bedside, pt placed on cardiac monitor and . Proper fall precautions in place. All needs met. Safety and comfort measures provided. Bed locked and in lowest position, side rails up for safety. Call bell within reach.

## 2022-01-20 LAB
CULTURE RESULTS: SIGNIFICANT CHANGE UP
SPECIMEN SOURCE: SIGNIFICANT CHANGE UP

## 2022-04-08 NOTE — ED PROVIDER NOTE - PROGRESS NOTE DETAILS
CT/xray showing evidence of intertrochanteric fracture. Spoke with orthopedic surgery. Will evaluate patient in the ED. no

## 2023-02-07 NOTE — ED PROVIDER NOTE - NS ED MD DISPO DIVISION
CHIEF COMPLAINT: OB visit  32 year old [id number] an intrauterine pregnancy at 31w1d by LMP c/w 8 wk u/s. Her due date is 4/10/2023. Planning TOLAC. +FM.  Back pain when walking, will try support belt.  Declines sterilization    Visit Vitals  /71   Pulse 73   Ht 5' 2\" (1.575 m)   Wt 72.5 kg (159 lb 12.8 oz)   LMP 2022 (Exact Date)   BMI 29.23 kg/m²     FH 32 cm      32 year old [id number] an intrauterine pregnancy at 31w1d by LMP c/w 8 wk u/s. Her due date is 4/10/2023   Prenatal care: blood type O pos. Antibody screen negative. H/H 11.35.1. rubella immune. HIV and RPR NR, HepBSAg and Hep C neg.   Varicella immune 19  Urine culture: 60,000-100,000CFU lactobacillus, GC, chlam, trich negative  Pap normal 11/10/20, plan postpartum  Cell free DNA normal, expecting a male infant   AFP showed no increased risk   expanded carrier testing negative  except for sickle cell trait.  Anatomy survey normal  Flu shot declined 10/4/22.  Tdap 23  28 wk: . H/H 33.2. RPR and HIV NR.   Thinking this will be her last pregnancy, declines tubal ligation*  Expecting a male infant      Hx of primary LTCS for fetal bradycardia:  previously counseled on route of delivery, planning TOLAC.                                    Hx of myomectomy: myometrium and endometrium were not entered. Ok for vaginal delivery if otherwise ok.     Fibroid uterus: growth scan planned at 32-34 wks. Ordered. she will call to schedule**     Sickle cell trait:  s/p Genetic counseling. Urine culture q trimester*--  Lactobacilli (stable and refractory to treatment last pregnancy) .  urine culture : lactobacilli     RTo 2 wks.  Will call to schedule growth us  Melisa Stephens MD    [facility]

## 2023-03-08 ENCOUNTER — INPATIENT (INPATIENT)
Facility: HOSPITAL | Age: 88
LOS: 5 days | Discharge: HOME | DRG: 603 | End: 2023-03-14
Attending: INTERNAL MEDICINE | Admitting: INTERNAL MEDICINE
Payer: MEDICARE

## 2023-03-08 VITALS
OXYGEN SATURATION: 95 % | RESPIRATION RATE: 18 BRPM | SYSTOLIC BLOOD PRESSURE: 104 MMHG | HEART RATE: 78 BPM | DIASTOLIC BLOOD PRESSURE: 70 MMHG | TEMPERATURE: 98 F

## 2023-03-08 DIAGNOSIS — Z96.652 PRESENCE OF LEFT ARTIFICIAL KNEE JOINT: Chronic | ICD-10-CM

## 2023-03-08 DIAGNOSIS — Z96.641 PRESENCE OF RIGHT ARTIFICIAL HIP JOINT: Chronic | ICD-10-CM

## 2023-03-08 DIAGNOSIS — L03.116 CELLULITIS OF LEFT LOWER LIMB: ICD-10-CM

## 2023-03-08 LAB
ALBUMIN SERPL ELPH-MCNC: 3.6 G/DL — SIGNIFICANT CHANGE UP (ref 3.3–5)
ALP SERPL-CCNC: 114 U/L — SIGNIFICANT CHANGE UP (ref 40–120)
ALT FLD-CCNC: 17 U/L — SIGNIFICANT CHANGE UP (ref 10–45)
ANION GAP SERPL CALC-SCNC: 10 MMOL/L — SIGNIFICANT CHANGE UP (ref 5–17)
AST SERPL-CCNC: 11 U/L — SIGNIFICANT CHANGE UP (ref 10–40)
BASE EXCESS BLDV CALC-SCNC: 3.9 MMOL/L — HIGH (ref -2–3)
BASOPHILS # BLD AUTO: 0.01 K/UL — SIGNIFICANT CHANGE UP (ref 0–0.2)
BASOPHILS NFR BLD AUTO: 0.2 % — SIGNIFICANT CHANGE UP (ref 0–2)
BILIRUB SERPL-MCNC: 0.4 MG/DL — SIGNIFICANT CHANGE UP (ref 0.2–1.2)
BUN SERPL-MCNC: 13 MG/DL — SIGNIFICANT CHANGE UP (ref 7–23)
CA-I SERPL-SCNC: 1.23 MMOL/L — SIGNIFICANT CHANGE UP (ref 1.15–1.33)
CALCIUM SERPL-MCNC: 9.2 MG/DL — SIGNIFICANT CHANGE UP (ref 8.4–10.5)
CHLORIDE BLDV-SCNC: 102 MMOL/L — SIGNIFICANT CHANGE UP (ref 96–108)
CHLORIDE SERPL-SCNC: 102 MMOL/L — SIGNIFICANT CHANGE UP (ref 96–108)
CO2 BLDV-SCNC: 31 MMOL/L — HIGH (ref 22–26)
CO2 SERPL-SCNC: 26 MMOL/L — SIGNIFICANT CHANGE UP (ref 22–31)
CREAT SERPL-MCNC: 0.63 MG/DL — SIGNIFICANT CHANGE UP (ref 0.5–1.3)
CRP SERPL-MCNC: 9 MG/L — HIGH (ref 0–4)
EGFR: 87 ML/MIN/1.73M2 — SIGNIFICANT CHANGE UP
EOSINOPHIL # BLD AUTO: 0.02 K/UL — SIGNIFICANT CHANGE UP (ref 0–0.5)
EOSINOPHIL NFR BLD AUTO: 0.4 % — SIGNIFICANT CHANGE UP (ref 0–6)
ERYTHROCYTE [SEDIMENTATION RATE] IN BLOOD: 20 MM/HR — SIGNIFICANT CHANGE UP (ref 0–20)
GAS PNL BLDV: 134 MMOL/L — LOW (ref 136–145)
GAS PNL BLDV: SIGNIFICANT CHANGE UP
GAS PNL BLDV: SIGNIFICANT CHANGE UP
GLUCOSE BLDV-MCNC: 121 MG/DL — HIGH (ref 70–99)
GLUCOSE SERPL-MCNC: 130 MG/DL — HIGH (ref 70–99)
HCO3 BLDV-SCNC: 30 MMOL/L — HIGH (ref 22–29)
HCT VFR BLD CALC: 36.3 % — SIGNIFICANT CHANGE UP (ref 34.5–45)
HCT VFR BLDA CALC: 36 % — SIGNIFICANT CHANGE UP (ref 34.5–46.5)
HGB BLD CALC-MCNC: 11.9 G/DL — SIGNIFICANT CHANGE UP (ref 11.7–16.1)
HGB BLD-MCNC: 11.4 G/DL — LOW (ref 11.5–15.5)
IMM GRANULOCYTES NFR BLD AUTO: 0.2 % — SIGNIFICANT CHANGE UP (ref 0–0.9)
LACTATE BLDV-MCNC: 1.6 MMOL/L — SIGNIFICANT CHANGE UP (ref 0.5–2)
LYMPHOCYTES # BLD AUTO: 0.86 K/UL — LOW (ref 1–3.3)
LYMPHOCYTES # BLD AUTO: 18.9 % — SIGNIFICANT CHANGE UP (ref 13–44)
MCHC RBC-ENTMCNC: 30.1 PG — SIGNIFICANT CHANGE UP (ref 27–34)
MCHC RBC-ENTMCNC: 31.4 GM/DL — LOW (ref 32–36)
MCV RBC AUTO: 95.8 FL — SIGNIFICANT CHANGE UP (ref 80–100)
MONOCYTES # BLD AUTO: 0.49 K/UL — SIGNIFICANT CHANGE UP (ref 0–0.9)
MONOCYTES NFR BLD AUTO: 10.7 % — SIGNIFICANT CHANGE UP (ref 2–14)
NEUTROPHILS # BLD AUTO: 3.17 K/UL — SIGNIFICANT CHANGE UP (ref 1.8–7.4)
NEUTROPHILS NFR BLD AUTO: 69.6 % — SIGNIFICANT CHANGE UP (ref 43–77)
NRBC # BLD: 0 /100 WBCS — SIGNIFICANT CHANGE UP (ref 0–0)
PCO2 BLDV: 49 MMHG — HIGH (ref 39–42)
PH BLDV: 7.39 — SIGNIFICANT CHANGE UP (ref 7.32–7.43)
PLATELET # BLD AUTO: 151 K/UL — SIGNIFICANT CHANGE UP (ref 150–400)
PO2 BLDV: 42 MMHG — SIGNIFICANT CHANGE UP (ref 25–45)
POTASSIUM BLDV-SCNC: 4.8 MMOL/L — SIGNIFICANT CHANGE UP (ref 3.5–5.1)
POTASSIUM SERPL-MCNC: 3.8 MMOL/L — SIGNIFICANT CHANGE UP (ref 3.5–5.3)
POTASSIUM SERPL-SCNC: 3.8 MMOL/L — SIGNIFICANT CHANGE UP (ref 3.5–5.3)
PROT SERPL-MCNC: 6.5 G/DL — SIGNIFICANT CHANGE UP (ref 6–8.3)
RBC # BLD: 3.79 M/UL — LOW (ref 3.8–5.2)
RBC # FLD: 13.6 % — SIGNIFICANT CHANGE UP (ref 10.3–14.5)
SAO2 % BLDV: 69.3 % — SIGNIFICANT CHANGE UP (ref 67–88)
SARS-COV-2 RNA SPEC QL NAA+PROBE: SIGNIFICANT CHANGE UP
SODIUM SERPL-SCNC: 138 MMOL/L — SIGNIFICANT CHANGE UP (ref 135–145)
WBC # BLD: 4.56 K/UL — SIGNIFICANT CHANGE UP (ref 3.8–10.5)
WBC # FLD AUTO: 4.56 K/UL — SIGNIFICANT CHANGE UP (ref 3.8–10.5)

## 2023-03-08 PROCEDURE — 73590 X-RAY EXAM OF LOWER LEG: CPT | Mod: 26,LT

## 2023-03-08 PROCEDURE — 99232 SBSQ HOSP IP/OBS MODERATE 35: CPT

## 2023-03-08 PROCEDURE — 99285 EMERGENCY DEPT VISIT HI MDM: CPT

## 2023-03-08 PROCEDURE — 73610 X-RAY EXAM OF ANKLE: CPT | Mod: 26,LT

## 2023-03-08 RX ORDER — CEFAZOLIN SODIUM 1 G
VIAL (EA) INJECTION
Refills: 0 | Status: DISCONTINUED | OUTPATIENT
Start: 2023-03-08 | End: 2023-03-14

## 2023-03-08 RX ORDER — VANCOMYCIN HCL 1 G
1000 VIAL (EA) INTRAVENOUS ONCE
Refills: 0 | Status: COMPLETED | OUTPATIENT
Start: 2023-03-08 | End: 2023-03-08

## 2023-03-08 RX ORDER — PIPERACILLIN AND TAZOBACTAM 4; .5 G/20ML; G/20ML
3.38 INJECTION, POWDER, LYOPHILIZED, FOR SOLUTION INTRAVENOUS ONCE
Refills: 0 | Status: COMPLETED | OUTPATIENT
Start: 2023-03-08 | End: 2023-03-08

## 2023-03-08 RX ORDER — DONEPEZIL HYDROCHLORIDE 10 MG/1
1 TABLET, FILM COATED ORAL
Qty: 0 | Refills: 0 | DISCHARGE

## 2023-03-08 RX ORDER — HEPARIN SODIUM 5000 [USP'U]/ML
5000 INJECTION INTRAVENOUS; SUBCUTANEOUS EVERY 8 HOURS
Refills: 0 | Status: DISCONTINUED | OUTPATIENT
Start: 2023-03-08 | End: 2023-03-14

## 2023-03-08 RX ORDER — CEFAZOLIN SODIUM 1 G
1000 VIAL (EA) INJECTION ONCE
Refills: 0 | Status: COMPLETED | OUTPATIENT
Start: 2023-03-08 | End: 2023-03-08

## 2023-03-08 RX ORDER — CEFAZOLIN SODIUM 1 G
1000 VIAL (EA) INJECTION EVERY 8 HOURS
Refills: 0 | Status: DISCONTINUED | OUTPATIENT
Start: 2023-03-08 | End: 2023-03-14

## 2023-03-08 RX ADMIN — Medication 1000 MILLIGRAM(S): at 14:00

## 2023-03-08 RX ADMIN — PIPERACILLIN AND TAZOBACTAM 200 GRAM(S): 4; .5 INJECTION, POWDER, LYOPHILIZED, FOR SOLUTION INTRAVENOUS at 11:29

## 2023-03-08 RX ADMIN — PIPERACILLIN AND TAZOBACTAM 3.38 GRAM(S): 4; .5 INJECTION, POWDER, LYOPHILIZED, FOR SOLUTION INTRAVENOUS at 13:06

## 2023-03-08 RX ADMIN — Medication 250 MILLIGRAM(S): at 12:10

## 2023-03-08 RX ADMIN — Medication 100 MILLIGRAM(S): at 21:10

## 2023-03-08 RX ADMIN — Medication 100 MILLIGRAM(S): at 14:42

## 2023-03-08 RX ADMIN — HEPARIN SODIUM 5000 UNIT(S): 5000 INJECTION INTRAVENOUS; SUBCUTANEOUS at 21:11

## 2023-03-08 NOTE — ED ADULT NURSE NOTE - NS ED PATIENT SAFETY CONCERN
Klever Rust is a 48 year old male that presents for his annual physical exam. Is currently not taking any prescription medications. He has a history of mildly elevated cholesterol, borderline blood pressure and prediabetes. He is overweight.    He knows he needs to diet and exercise and lose some weight. He had blood drawn prior to this visit and he would like to discuss the results.    Social History     Socioeconomic History   • Marital status: /Civil Union     Spouse name: Chelsey   • Number of children: 5   • Years of education: Not on file   • Highest education level: Not on file   Occupational History     Comment:  [employer]   Social Needs   • Financial resource strain: Not on file   • Food insecurity:     Worry: Not on file     Inability: Not on file   • Transportation needs:     Medical: Not on file     Non-medical: Not on file   Tobacco Use   • Smoking status: Never Smoker   • Smokeless tobacco: Never Used   Substance and Sexual Activity   • Alcohol use: Yes     Comment: social   • Drug use: No   • Sexual activity: Yes     Partners: Female   Lifestyle   • Physical activity:     Days per week: Not on file     Minutes per session: Not on file   • Stress: Not on file   Relationships   • Social connections:     Talks on phone: Not on file     Gets together: Not on file     Attends Congregational service: Not on file     Active member of club or organization: Not on file     Attends meetings of clubs or organizations: Not on file     Relationship status: Not on file   • Intimate partner violence:     Fear of current or ex partner: Not on file     Emotionally abused: Not on file     Physically abused: Not on file     Forced sexual activity: Not on file   Other Topics Concern   • Not on file   Social History Narrative   • Not on file       Past Medical History:   Diagnosis Date   • Intervertebral lumbar disc disorder with myelopathy, lumbar region 2010   • Other and unspecified  hyperlipidemia 2006   • Prediabetes 2012    A1c 6.2       Past Surgical History:   Procedure Laterality Date   • Colonoscopy w biopsy  11/6/06    Dr. Padgett/normal/due 11/2016   • Esophagogastroduodenoscopy transoral flex w/bx single or mult  11/6/06    Dr. Padgett/esophagitis/gastritis       Family History   Problem Relation Age of Onset   • Hyperlipidemia Father    • Hyperlipidemia Brother    • Patient is unaware of any medical problems Son    • Patient is unaware of any medical problems Son    • Patient is unaware of any medical problems Son    • Patient is unaware of any medical problems Daughter    • Diabetes Maternal Aunt         heart disease 60s       No current outpatient medications on file.     No current facility-administered medications for this visit.        ALLERGIES:  No Known Allergies      Review of Systems: No fever, chills, weight loss, weight gain, fatigue; No vision changes; No ear pain, tinnitus, drainage or hearing loss; No nasal congestion, rhinorrhea, sore throat, swollen glands; No chest pain, palpitations, syncope, paroxysmal nocturnal dyspnea or lower extremity edema; No cough or dyspnea; No nausea, vomiting, diarrhea, constipation, abdominal pain, abdominal distention, bloody stool, black tarry stools, or change in bowel habits; No urinary frequency, urgency, dysuria, hesitancy, decreased urine output, No joint pain, edema, decreased range of motion or muscle pain; No rashes or other skin lesions; No headache, focal weakness, paresthesia, coordination problems, dysphagia, or speech issues; No depression, no anxiety, no suicidal ideation; No polydipsia, polyuria, heat or cold intollerance; No abnormal bleeding, bruising.    PHYSICAL EXAM:  There were no vitals taken for this visit.  Constitutional:  No acute distress, non-toxic appearance.  HENT: Normocephalic, Atraumatic, Bilateral external ears normal. Oropharynx moist. No oral exudates. Nose normal.  Eyes:  PERRLA, EOMI.  Conjunctivae normal. No discharge.  Neck: Normal range of motion. No tenderness, supple, No lymphadenopathy. No stridor.  Cardiovascular:  Normal heart rate. Normal rhythm. No murmurs, no rubs, no gallops.  Pulmonary/Chest:  Normal breath sounds, No respiratory distress.  No wheezing. No chest tenderness.  Abdomen:  Bowel sounds normal. Soft, no tenderness, no masses. No pulsatile masses.  Back:  No tenderness. No CVA tenderness.  Extremities:  Normal range of motion. Intact distal pulses. No edema. No tenderness.    Lymphatic:  No lymphadenopathy noted.  Neurologic:  Alert & oriented x 3, Normal motor function. Normal sensory function. No focal deficits.  Skin:  Warm. Dry. No erythema. No rash.  Psychiatric:  Affect normal, Judgment normal, Mood normal.    Impression: This is a 48-year-old man with borderline cholesterol, borderline blood pressure, and prediabetes    Plan: He knows that all these problems are weight related. He knows he needs to diet and exercise and lose some weight. We discussed various strategies to try to accomplish that goal. He should try to limit carbohydrates in his diet.    I would like to see him once a year.        Unable to assess due to medical condition

## 2023-03-08 NOTE — CONSULT NOTE ADULT - ASSESSMENT
87 year old female with dementia presents with progressive wounds to her lower extremity.  She has been afebrile with a normal white blood cell count.     87 year old female with dementia presents with progressive wounds to her lower extremity.  She has been afebrile with a normal white blood cell count.    Bilatral soft tissue infections are not common.    There is warmth and erythema to bilateral lower legs.        No objection to treating as soft tissue infection with cefazolin    Follow up blood cultures    Check MRSA nasal swab  Check sed rate, crp,

## 2023-03-08 NOTE — ED PROVIDER NOTE - CARE PLAN
1 Principal Discharge DX:	Cellulitis of left lower extremity  Secondary Diagnosis:	Ulcer of left lower leg, limited to breakdown of skin

## 2023-03-08 NOTE — ED PROVIDER NOTE - PHYSICAL EXAMINATION
GENERAL: no acute distress, non-toxic appearing  HEENT: normal conjunctiva, oral mucosa moist  CARDIAC: regular rate and regular rhythm, 2+ dp/pt pulses bilateral lower extremities, bp reassuring  PULM: clear to ascultation bilaterally, no incr wob, sats well on RA  GI: abdomen nondistended, soft, nontender  : no suprapubic tenderness  NEURO: alert and awake with dementia, normal speech, moving all extremities without lateralization  MSK: no visible deformities, no peripheral edema, no swelling/redness to joints  SKIN: LLE anterior distal tib redness/warmth with two areas of ulceration to anteromedial aspect both about 1.5cm in diameter deep to soft tissue, no crepitus/purulent drainage/fluctuance  PSYCH: cooperative, appropriate mood/affect

## 2023-03-08 NOTE — H&P ADULT - HISTORY OF PRESENT ILLNESS
87F PMH Dementia, HTN, presents from [facility] for a LLE wound/rash noticed this morning by staff. Reportedly 2 days ago pt with dark spots on LLE ant shin but this morning the staff noticed it was red. Pt only able to give limited history as she has dementia. Per EMS, pt hasn't had fevers and is reportedly at baseline mental status per home staff.

## 2023-03-08 NOTE — H&P ADULT - NSHPLABSRESULTS_GEN_ALL_CORE
LABS:                        11.4   4.56  )-----------( 151      ( 08 Mar 2023 11:17 )             36.3     03-08    138  |  102  |  13  ----------------------------<  130<H>  3.8   |  26  |  0.63    Ca    9.2      08 Mar 2023 11:17    TPro  6.5  /  Alb  3.6  /  TBili  0.4  /  DBili  x   /  AST  11  /  ALT  17  /  AlkPhos  114  03-08              RADIOLOGY & ADDITIONAL TESTS:

## 2023-03-08 NOTE — H&P ADULT - ASSESSMENT
87F PMH Dementia, HTN, presents from [facility] for a LLE wound/rash noticed this morning by staff. Reportedly 2 days ago pt with dark spots on LLE ant shin but this morning the staff noticed it was red. Pt only able to give limited history as she has dementia. Per EMS, pt hasn't had fevers and is reportedly at baseline mental status per home staff.       cellulitis with lower ext ulcers  - iv ancef  - blood cultures  - ID consult    dementia  - b12, folate, tsh

## 2023-03-08 NOTE — ED ADULT NURSE REASSESSMENT NOTE - NS ED NURSE REASSESS COMMENT FT1
Report received from ELO Blas. PT is resting comfortably in bed, breathing unlabored, and has dementia at baseline. PT not currently speaking at this time but is awake and sitting up in bed. EKG completed. PT placed on primafit. Vital signs stable. Updated PT and PT's daughter on plan of care. Awaiting XRAY. Safety and comfort maintained. Call bell within reach. Family at the bedside.

## 2023-03-08 NOTE — ED PROVIDER NOTE - CLINICAL SUMMARY MEDICAL DECISION MAKING FREE TEXT BOX
Impression and plan: Differential diagnosis includes purulent cellulitis versus underlying osteomyelitis.  Patient is not a diabetic however depth of the wounds and proximity of the bone to the skin in this part of the body raises concern for osteomyelitis.  Therefore sed rate, CRP x-rays blood cultures are indicated.    Anticipate the patient will need to be admitted, given age and appearance of wound and skin.    Screening labs CBC, CMP, blood gas are appropriate.  Empiric antibiotics will be administered.

## 2023-03-08 NOTE — ED PROVIDER NOTE - OBJECTIVE STATEMENT
87F PMH Dementia, HTN, presents from NH for a LLE wound/rash noticed this morning by staff. Pt only able to give limited history as she has dementia. Per EMS, pt hasn't had fevers and is reportedly at baseline mental status per home staff.     Meds: Metoprolol, citalopram 87F PMH Dementia, HTN, presents from [facility] for a LLE wound/rash noticed this morning by staff. Reportedly 2 days ago pt with dark spots on LLE ant shin but this morning the staff noticed it was red. Pt only able to give limited history as she has dementia. Per EMS, pt hasn't had fevers and is reportedly at baseline mental status per home staff.     Meds: Metoprolol, citalopram

## 2023-03-08 NOTE — CONSULT NOTE ADULT - SUBJECTIVE AND OBJECTIVE BOX
HPI:  87F PMH Dementia, HTN, presents from [facility] for a LLE wound/rash noticed on 3/7 staff. Reportedly 2 days ago pt with dark spots on LLE ant shin but this morning the staff noticed it was red. Pt only able to give limited history as she has dementia. Per EMS, pt hasn't had fevers and is reportedly at baseline mental status per home staff.     Since presentation, she has been afebrile with a normal white blood cell count.          PAST MEDICAL & SURGICAL HISTORY:  Alzheimer disease      History of total knee arthroplasty, left      H/O total hip arthroplasty, right          Allergies    No Known Allergies    Intolerances        ANTIMICROBIALS:  ceFAZolin   IVPB    ceFAZolin   IVPB 1000 every 8 hours      OTHER MEDS:  heparin   Injectable 5000 Unit(s) SubCutaneous every 8 hours      SOCIAL HISTORY:tx from assisted living  Pt unable to provide additional details due to mental status    FAMILY HISTORY:  Pt unable to provide additional details due to mental status      REVIEW OF SYSTEMS  [ x] ROS unobtainable because:  Pt unable to provide additional details due to mental status    [  ] All other systems negative except as noted below:	    Constitutional:  [ ] fever [ ] chills  [ ] weight loss  [ ] weakness  Skin:  [ ] rash [ ] phlebitis	  Eyes: [ ] icterus [ ] pain  [ ] discharge	  ENMT: [ ] sore throat  [ ] thrush [ ] ulcers [ ] exudates  Respiratory: [ ] dyspnea [ ] hemoptysis [ ] cough [ ] sputum	  Cardiovascular:  [ ] chest pain [ ] palpitations [ ] edema	  Gastrointestinal:  [ ] nausea [ ] vomiting [ ] diarrhea [ ] constipation [ ] pain	  Genitourinary:  [ ] dysuria [ ] frequency [ ] hematuria [ ] discharge [ ] flank pain  [ ] incontinence  Musculoskeletal:  [ ] myalgias [ ] arthralgias [ ] arthritis  [ ] back pain  Neurological:  [ ] headache [ ] seizures  [ ] confusion/altered mental status  Psychiatric:  [ ] anxiety [ ] depression	  Hematology/Lymphatics:  [ ] lymphadenopathy  Endocrine:  [ ] adrenal [ ] thyroid  Allergic/Immunologic:	 [ ] transplant [ ] seasonal    PHYSICAL EXAM:  General: [ ] non-toxic  HEAD/EYES: [ ] PERRL [ ] white sclera [ ] icterus  ENT:  [ ] normal [ ] supple [ ] thrush [ ] pharyngeal exudate  Cardiovascular:   [ ] murmur [ ] normal [ ] PPM/AICD  Respiratory:  [ ] clear to ausculation bilaterally  GI:  [ ] soft, non-tender, normal bowel sounds  :  [ ] devi [ ] no CVA tenderness   Musculoskeletal:  [ ] no synovitis  Neurologic:  [ ] non-focal exam   Skin:  [ ] no rash  Lymph: [ ] no lymphadenopathy  Psychiatric:  [ ] appropriate affect [ ] alert & oriented  Lines:  [ ] no phlebitis [ ] central line          Drug Dosing Weight  Height (cm): 167.6 (19 Jan 2022 05:44)  Weight (kg): 58.5 (14 Nov 2021 07:33)  BMI (kg/m2): 20.8 (19 Jan 2022 05:44)  BSA (m2): 1.66 (19 Jan 2022 05:44)    Vital Signs Last 24 Hrs  T(F): 97.6 (03-08-23 @ 16:19), Max: 98.6 (03-08-23 @ 10:40)    Vital Signs Last 24 Hrs  HR: 71 (03-08-23 @ 16:19) (71 - 79)  BP: 123/74 (03-08-23 @ 16:19) (104/70 - 144/65)  RR: 19 (03-08-23 @ 16:19)  SpO2: 97% (03-08-23 @ 16:19) (94% - 100%)  Wt(kg): --                          11.4   4.56  )-----------( 151      ( 08 Mar 2023 11:17 )             36.3       03-08    138  |  102  |  13  ----------------------------<  130<H>  3.8   |  26  |  0.63    Ca    9.2      08 Mar 2023 11:17    TPro  6.5  /  Alb  3.6  /  TBili  0.4  /  DBili  x   /  AST  11  /  ALT  17  /  AlkPhos  114  03-08          MICROBIOLOGY:    RADIOLOGY:    < from: Xray Chest 1 View- PORTABLE-Urgent (Xray Chest 1 View- PORTABLE-Urgent .) (01.19.22 @ 06:44) >    ACC: [id number] EXAM:  XR CHEST PORTABLE URGENT 1V                          PROCEDURE DATE:  01/19/2022          INTERPRETATION:  CLINICAL INFORMATION: Trauma, status post fall    EXAM: Frontal view of the chest.    COMPARISON: Chest radiograph from 2/1/2019    FINDINGS:    The heart size cannot be assessed on this projection.  The lungs are clear.  No pleural effusion or pneumothorax.    IMPRESSION:  Clear lungs.    < end of copied text >   HPI:  87F PMH Dementia, HTN, presents from [facility] for a LLE wound/rash noticed on 3/7 staff. Reportedly 2 days ago pt with dark spots on LLE ant shin but this morning the staff noticed it was red. Pt only able to give limited history as she has dementia. Per EMS, pt hasn't had fevers and is reportedly at baseline mental status per home staff.     Since presentation, she has been afebrile with a normal white blood cell count.  Pt able to state her first name, unable to provide additional history.           PAST MEDICAL & SURGICAL HISTORY:  Alzheimer disease      History of total knee arthroplasty, left      H/O total hip arthroplasty, right          Allergies    No Known Allergies    Intolerances        ANTIMICROBIALS:  ceFAZolin   IVPB    ceFAZolin   IVPB 1000 every 8 hours      OTHER MEDS:  heparin   Injectable 5000 Unit(s) SubCutaneous every 8 hours      SOCIAL HISTORY:tx from assisted living  Pt unable to provide additional details due to mental status    FAMILY HISTORY:  Pt unable to provide additional details due to mental status      REVIEW OF SYSTEMS  [ x] ROS unobtainable because:  Pt unable to provide additional details due to mental status    [  ] All other systems negative except as noted below:	    Constitutional:  [ ] fever [ ] chills  [ ] weight loss  [ ] weakness  Skin:  [ ] rash [ ] phlebitis	  Eyes: [ ] icterus [ ] pain  [ ] discharge	  ENMT: [ ] sore throat  [ ] thrush [ ] ulcers [ ] exudates  Respiratory: [ ] dyspnea [ ] hemoptysis [ ] cough [ ] sputum	  Cardiovascular:  [ ] chest pain [ ] palpitations [ ] edema	  Gastrointestinal:  [ ] nausea [ ] vomiting [ ] diarrhea [ ] constipation [ ] pain	  Genitourinary:  [ ] dysuria [ ] frequency [ ] hematuria [ ] discharge [ ] flank pain  [ ] incontinence  Musculoskeletal:  [ ] myalgias [ ] arthralgias [ ] arthritis  [ ] back pain  Neurological:  [ ] headache [ ] seizures  [ ] confusion/altered mental status  Psychiatric:  [ ] anxiety [ ] depression	  Hematology/Lymphatics:  [ ] lymphadenopathy  Endocrine:  [ ] adrenal [ ] thyroid  Allergic/Immunologic:	 [ ] transplant [ ] seasonal    PHYSICAL EXAM:  General: [x ] non-toxic  HEAD/EYES: [ ] PERRL [x ] white sclera [ ] icterus  ENT:  [ ] normal [x ] supple [ ] thrush [ ] pharyngeal exudate  Cardiovascular:   [ ] murmur [x ] normal [ ] PPM/AICD  Respiratory:  [x ] clear to ausculation bilaterally  GI:  [x ] soft, non-tender, normal bowel sounds  :  [ ] devi [ ] no CVA tenderness   Musculoskeletal:  [ ] no synovitis  Neurologic:  [ ] non-focal exam   Skin:  [x ] left leg, three pathces or erythema with central ulcer with superficial necrosis  right leg- one patch of erythema with central area with necrosis    Lymph: [x ] no lymphadenopathy  Psychiatric:  [ ] appropriate affect [ ] alert & oriented  Lines:  [ x] no phlebitis [ ] central line          Drug Dosing Weight  Height (cm): 167.6 (19 Jan 2022 05:44)  Weight (kg): 58.5 (14 Nov 2021 07:33)  BMI (kg/m2): 20.8 (19 Jan 2022 05:44)  BSA (m2): 1.66 (19 Jan 2022 05:44)    Vital Signs Last 24 Hrs  T(F): 97.6 (03-08-23 @ 16:19), Max: 98.6 (03-08-23 @ 10:40)    Vital Signs Last 24 Hrs  HR: 71 (03-08-23 @ 16:19) (71 - 79)  BP: 123/74 (03-08-23 @ 16:19) (104/70 - 144/65)  RR: 19 (03-08-23 @ 16:19)  SpO2: 97% (03-08-23 @ 16:19) (94% - 100%)  Wt(kg): --                          11.4   4.56  )-----------( 151      ( 08 Mar 2023 11:17 )             36.3       03-08    138  |  102  |  13  ----------------------------<  130<H>  3.8   |  26  |  0.63    Ca    9.2      08 Mar 2023 11:17    TPro  6.5  /  Alb  3.6  /  TBili  0.4  /  DBili  x   /  AST  11  /  ALT  17  /  AlkPhos  114  03-08          MICROBIOLOGY:    RADIOLOGY:    < from: Xray Chest 1 View- PORTABLE-Urgent (Xray Chest 1 View- PORTABLE-Urgent .) (01.19.22 @ 06:44) >    ACC: [id number] EXAM:  XR CHEST PORTABLE URGENT 1V                          PROCEDURE DATE:  01/19/2022          INTERPRETATION:  CLINICAL INFORMATION: Trauma, status post fall    EXAM: Frontal view of the chest.    COMPARISON: Chest radiograph from 2/1/2019    FINDINGS:    The heart size cannot be assessed on this projection.  The lungs are clear.  No pleural effusion or pneumothorax.    IMPRESSION:  Clear lungs.    < end of copied text >

## 2023-03-08 NOTE — ED ADULT NURSE NOTE - OBJECTIVE STATEMENT
1030 87 yr old WF brought to ER via ambulance on stretcher from nursing home for further eval andt x of left lower leg wounds, swelling and redness. Pt confused. Non verbal. Wearing diaper. Fall risk precautions maintained. 1030 87 yr old WF brought to ER via ambulance on stretcher from nursing home for further eval and tx of left lower leg wounds, swelling and redness. Pt confused. Non verbal. Wearing diaper. Fall risk precautions maintained. Erythema noted left lower leg with 2 dimed sized wounds with dried yellowish lesion . No drainage at present. Dr camacho pt.

## 2023-03-08 NOTE — PATIENT PROFILE ADULT - FALL HARM RISK - HARM RISK INTERVENTIONS

## 2023-03-08 NOTE — ED PROVIDER NOTE - ATTENDING CONTRIBUTION TO CARE
MD Hendricks:  patient seen and evaluated with the EM Fellow. I was present for key portions of the History & Physical, and I agree with the Impression & Plan.    Patient is a 87-year-old female, brought into the ED by EMS from assisted living for evaluation of left lower extremity wounds.    Duration: Unknown.  Location distal anterior medial tibia on the left  Associated symptoms: Not applicable; patient has dementia.  However, she denies fever or chills.  Wound is tender to palpation  Intensity: Not applicable, for the reasons stated above.    Vital signs: Heart rate 78, blood pressure 104/70, respirations 18, temperature 98.0, saturation 95%  General: Elderly female.  No acute distress  HEENT: Normocephalic atraumatic, pupils equal round react to light, neck supple  No respiratory distress  Cardiac: Regular rate and rhythm  Abdomen soft nondistended nontender  Extremities: Left lower extremity with grossly erythematous distal tibia with 2 associated anterior tibial wound ulcers as described below/  Skin: Left distal tibia erythema; noncircumferential.  1 cm well-circumscribed ulcers approximately 4 inches apart similar in appearance, malodorous with associated scant purulent drainage.  Distal skin of the tibia is tender to palpation    Impression and plan: Differential diagnosis includes purulent cellulitis versus underlying osteomyelitis.  Patient is not a diabetic however depth of the wounds and proximity of the bone to the skin in this part of the body raises concern for osteomyelitis.  Therefore sed rate, CRP x-rays blood cultures are indicated.    Anticipate the patient will need to be admitted, given age and appearance of wound and skin.    Screening labs CBC, CMP, blood gas are appropriate.  Empiric antibiotics will be administered.

## 2023-03-09 LAB
ANION GAP SERPL CALC-SCNC: 11 MMOL/L — SIGNIFICANT CHANGE UP (ref 5–17)
BUN SERPL-MCNC: 9 MG/DL — SIGNIFICANT CHANGE UP (ref 7–23)
CALCIUM SERPL-MCNC: 9.1 MG/DL — SIGNIFICANT CHANGE UP (ref 8.4–10.5)
CHLORIDE SERPL-SCNC: 105 MMOL/L — SIGNIFICANT CHANGE UP (ref 96–108)
CO2 SERPL-SCNC: 25 MMOL/L — SIGNIFICANT CHANGE UP (ref 22–31)
CREAT SERPL-MCNC: 0.57 MG/DL — SIGNIFICANT CHANGE UP (ref 0.5–1.3)
CRP SERPL-MCNC: 8 MG/L — HIGH (ref 0–4)
EGFR: 89 ML/MIN/1.73M2 — SIGNIFICANT CHANGE UP
ERYTHROCYTE [SEDIMENTATION RATE] IN BLOOD: 28 MM/HR — HIGH (ref 0–20)
FOLATE SERPL-MCNC: 19.2 NG/ML — SIGNIFICANT CHANGE UP
GLUCOSE SERPL-MCNC: 86 MG/DL — SIGNIFICANT CHANGE UP (ref 70–99)
HCT VFR BLD CALC: 37.1 % — SIGNIFICANT CHANGE UP (ref 34.5–45)
HGB BLD-MCNC: 11.8 G/DL — SIGNIFICANT CHANGE UP (ref 11.5–15.5)
MAGNESIUM SERPL-MCNC: 1.9 MG/DL — SIGNIFICANT CHANGE UP (ref 1.6–2.6)
MCHC RBC-ENTMCNC: 30.7 PG — SIGNIFICANT CHANGE UP (ref 27–34)
MCHC RBC-ENTMCNC: 31.8 GM/DL — LOW (ref 32–36)
MCV RBC AUTO: 96.6 FL — SIGNIFICANT CHANGE UP (ref 80–100)
NRBC # BLD: 0 /100 WBCS — SIGNIFICANT CHANGE UP (ref 0–0)
PHOSPHATE SERPL-MCNC: 3.2 MG/DL — SIGNIFICANT CHANGE UP (ref 2.5–4.5)
PLATELET # BLD AUTO: 166 K/UL — SIGNIFICANT CHANGE UP (ref 150–400)
POTASSIUM SERPL-MCNC: 3.6 MMOL/L — SIGNIFICANT CHANGE UP (ref 3.5–5.3)
POTASSIUM SERPL-SCNC: 3.6 MMOL/L — SIGNIFICANT CHANGE UP (ref 3.5–5.3)
PROCALCITONIN SERPL-MCNC: 0.06 NG/ML — SIGNIFICANT CHANGE UP (ref 0.02–0.1)
RBC # BLD: 3.84 M/UL — SIGNIFICANT CHANGE UP (ref 3.8–5.2)
RBC # FLD: 13.5 % — SIGNIFICANT CHANGE UP (ref 10.3–14.5)
SODIUM SERPL-SCNC: 141 MMOL/L — SIGNIFICANT CHANGE UP (ref 135–145)
TSH SERPL-MCNC: 0.73 UIU/ML — SIGNIFICANT CHANGE UP (ref 0.27–4.2)
VIT B12 SERPL-MCNC: 989 PG/ML — SIGNIFICANT CHANGE UP (ref 232–1245)
WBC # BLD: 4.51 K/UL — SIGNIFICANT CHANGE UP (ref 3.8–10.5)
WBC # FLD AUTO: 4.51 K/UL — SIGNIFICANT CHANGE UP (ref 3.8–10.5)

## 2023-03-09 PROCEDURE — 99232 SBSQ HOSP IP/OBS MODERATE 35: CPT

## 2023-03-09 RX ADMIN — Medication 100 MILLIGRAM(S): at 22:40

## 2023-03-09 RX ADMIN — HEPARIN SODIUM 5000 UNIT(S): 5000 INJECTION INTRAVENOUS; SUBCUTANEOUS at 13:31

## 2023-03-09 RX ADMIN — Medication 100 MILLIGRAM(S): at 05:24

## 2023-03-09 RX ADMIN — Medication 100 MILLIGRAM(S): at 13:31

## 2023-03-09 RX ADMIN — HEPARIN SODIUM 5000 UNIT(S): 5000 INJECTION INTRAVENOUS; SUBCUTANEOUS at 05:25

## 2023-03-09 RX ADMIN — HEPARIN SODIUM 5000 UNIT(S): 5000 INJECTION INTRAVENOUS; SUBCUTANEOUS at 22:40

## 2023-03-09 NOTE — PROGRESS NOTE ADULT - ASSESSMENT
87F PMH Dementia, HTN, presents from [facility] for a LLE wound/rash noticed this morning by staff. Reportedly 2 days ago pt with dark spots on LLE ant shin but this morning the staff noticed it was red. Pt only able to give limited history as she has dementia. Per EMS, pt hasn't had fevers and is reportedly at baseline mental status per home staff.       cellulitis with lower ext ulcers  - iv ancef  - blood cultures  - ID consult appreciated    dementia  - b12, folate, tsh is normal    dispo  - return to [facility] with PT services

## 2023-03-09 NOTE — PHYSICAL THERAPY INITIAL EVALUATION ADULT - ADDITIONAL COMMENTS
Pt is a poor historian and emergency contact (son) # is out of service and daughter # went straight to . As per chart pt lives in memory unit at [facility].

## 2023-03-09 NOTE — PHYSICAL THERAPY INITIAL EVALUATION ADULT - BALANCE TRAINING, PT EVAL
GOAL: Pt will increase static/dynamic sitting balance by 1/2 grade in 2wks to decrease fall risk and increase independence with ADLs

## 2023-03-09 NOTE — PROGRESS NOTE ADULT - ASSESSMENT
87 year old female with dementia presents with progressive wounds to her lower extremity.  She has been afebrile with a normal white blood cell count.    Bilatral soft tissue infections are not common.    There is warmth and erythema to bilateral lower legs. Improved       No objection to treating as soft tissue infection with cefazolin    Check mrsa nasal swab

## 2023-03-09 NOTE — PHYSICAL THERAPY INITIAL EVALUATION ADULT - PERTINENT HX OF CURRENT PROBLEM, REHAB EVAL
87F PMH Dementia, HTN, presents from [facility] for a LLE wound/rash noticed this morning by staff. Reportedly 2 days ago pt with dark spots on LLE ant shin but this morning the staff noticed it was red. Pt only able to give limited history as she has dementia. Per EMS, pt hasn't had fevers and is reportedly at baseline mental status per home staff. XRayLTib/Fib: Partially evaluated hardware within the distal femur and left knee arthroplasty.Soft tissue ulcer along the medial aspect of the distal tibia. Soft tissue swelling about the calf and ankle. No acute displaced fracture. No cortical erosion or periostitis to suggest osteomyelitis. Ankle mortise is symmetric. Atherosclerotic calcifications are noted. XRayLAnkle: Partially evaluated hardware within the distal femur and left knee arthroplasty.Soft tissue ulcer along the medial aspect of the distal tibia. Soft tissue swelling about the calf and ankle. No acute displaced fracture. No cortical erosion or periostitis to suggest osteomyelitis. Ankle mortise is symmetric.Atherosclerotic calcifications are noted.

## 2023-03-10 ENCOUNTER — TRANSCRIPTION ENCOUNTER (OUTPATIENT)
Age: 88
End: 2023-03-10

## 2023-03-10 LAB
ERYTHROCYTE [SEDIMENTATION RATE] IN BLOOD: 27 MM/HR — HIGH (ref 0–20)
MRSA PCR RESULT.: DETECTED
S AUREUS DNA NOSE QL NAA+PROBE: DETECTED

## 2023-03-10 PROCEDURE — 99232 SBSQ HOSP IP/OBS MODERATE 35: CPT

## 2023-03-10 PROCEDURE — 99222 1ST HOSP IP/OBS MODERATE 55: CPT

## 2023-03-10 RX ORDER — CHLORHEXIDINE GLUCONATE 213 G/1000ML
1 SOLUTION TOPICAL DAILY
Refills: 0 | Status: DISCONTINUED | OUTPATIENT
Start: 2023-03-10 | End: 2023-03-14

## 2023-03-10 RX ADMIN — Medication 100 MILLIGRAM(S): at 21:42

## 2023-03-10 RX ADMIN — HEPARIN SODIUM 5000 UNIT(S): 5000 INJECTION INTRAVENOUS; SUBCUTANEOUS at 05:50

## 2023-03-10 RX ADMIN — HEPARIN SODIUM 5000 UNIT(S): 5000 INJECTION INTRAVENOUS; SUBCUTANEOUS at 21:42

## 2023-03-10 RX ADMIN — HEPARIN SODIUM 5000 UNIT(S): 5000 INJECTION INTRAVENOUS; SUBCUTANEOUS at 15:46

## 2023-03-10 RX ADMIN — Medication 100 MILLIGRAM(S): at 06:33

## 2023-03-10 RX ADMIN — Medication 100 MILLIGRAM(S): at 15:45

## 2023-03-10 NOTE — DISCHARGE NOTE PROVIDER - NSDCFUADDAPPT_GEN_ALL_CORE_FT
APPTS ARE READY TO BE MADE: [ x] YES    Best Family or Patient Contact (if needed):    Additional Information about above appointments (if needed):    1:   2:   3:     Other comments or requests:    APPTS ARE READY TO BE MADE: [ x] YES    Best Family or Patient Contact (if needed):    Additional Information about above appointments (if needed):    1:   2:   3:     Other comments or requests:     Patient is being discharged to [facility]. Caregiver will arrange follow up.

## 2023-03-10 NOTE — DISCHARGE NOTE PROVIDER - CARE PROVIDER_API CALL
Dalotn Monaco)  Family Medicine  [address] [place] [address], [address]  [place] [address]  Phone: [phone]  Fax: [phone]  Follow Up Time:

## 2023-03-10 NOTE — CONSULT NOTE ADULT - ASSESSMENT
A/P: 87F PMH Dementia, HTN presents from [facility] for a LLE wound/rash    Wound Consult requested to assist w/ management of LLE Cellulitis  LLE Wounds  BLE PVD w/ stasis dermatitis    Lt shin- medihoney + Allevyn dressing  BLE elevation & Compression  LLE Xray w/ STS and hardware, no fx disloc or gas  Abx per Medicine  Moisturize intact skin w/ Aquaphor cream BID  Nutrition optimization         encourage high quality protein, lori/ prosource, MVI & Vit C to promote wound healing  Continue turning and positioning w/ offloading assistive devices as per protocol  Buttocks/ Sacrum Barbara BID and prn soiling       Continue w/ attends under pads and Pericare as per protocol  Waffle Cushion to chair when oob to chair  Continue w/ low air loss pressure redistribution bed surface   Care as per medicine, will follow w/ you/ remain available as requested  Upon discharge f/u as outpatient at [facility] [address] [place], [place] [id number]  D/w team & RN & attng  Thank you for this consult  Klarissa Duarte PA-C C[facility] [id number]  Nights/ Weekends/ Holidays please call:  General Surgery Consult pager [phone]) for emergencies  Wound PT for multilayer leg wrapping or VAC issues (x [phone])   I spent 55 minutes face to face w/ this pt of which more than 50% of the time was spent counseling & coordinating care of this pt.

## 2023-03-10 NOTE — CONSULT NOTE ADULT - SUBJECTIVE AND OBJECTIVE BOX
Wound SURGERY CONSULT NOTE    HPI:  87F PMH Dementia, HTN, presents from [facility] for a LLE wound/rash noticed this morning by staff. Reportedly 2 days ago pt with dark spots on LLE anterior shin but this morning the staff noticed it was red. Pt only able to give limited history as she has dementia. Per EMS, pt hasn't had fevers and is reportedly at baseline mental status per home staff.     Wound consult requested by team to assist w/ management of  LLE wounds.  Pt unable to c/o pain, drainage, odor, color change,  or worsening swelling. Offloading and pericare initiated upon admission as pt sedentary 2/2 to illness. Pt is Incontinent of urine & stool.   Appetite good w/o weight loss.      Current Diet: Diet, Regular (03-08-23 @ 14:21)      PAST MEDICAL & SURGICAL HISTORY:  Alzheimer disease    s/p total knee arthroplasty, left    s/p total hip arthroplasty, right      REVIEW OF SYSTEMS: Pt unable to offer      MEDICATIONS  (STANDING):  ceFAZolin   IVPB 1000 milliGRAM(s) IV Intermittent every 8 hours  heparin   Injectable 5000 Unit(s) SubCutaneous every 8 hours    No Known Allergies      SOCIAL HISTORY:      FAMILY HISTORY: no h/o significant problems    PHYSICAL EXAM:  Vital Signs Last 24 Hrs  T(C): 36.7 (10 Mar 2023 07:47), Max: 37.2 (09 Mar 2023 16:02)  T(F): 98 (10 Mar 2023 07:47), Max: 99 (09 Mar 2023 16:02)  HR: 73 (10 Mar 2023 07:47) (73 - 86)  BP: 134/82 (10 Mar 2023 07:47) (120/78 - 136/81)  BP(mean): --  RR: 18 (10 Mar 2023 07:47) (18 - 18)  SpO2: 96% (10 Mar 2023 07:47) (94% - 96%)    Parameters below as of 10 Mar 2023 07:47  Patient On (Oxygen Delivery Method): room air    NAD, lethargic but arousable frail,  WD/ WN/ WG  [facility] P500 bed  HEENT:  NC/AT, EOMI, sclera clear, mucosa moist, throat clear, trachea midline, neck supple  Respiratory: nonlabored w/ equal chest rise  Gastrointestinal soft NT/ND   : (+) purewick  Neurology:  weakened strength & sensation grossly intact  Psych: difficult to assess  Musculoskeletal:  FROM, no deformities/ contractures  Vascular: BLE equally warm,  no cyanosis, clubbing, nor acute ischemia        BLE edema equal        w/ BLE DP/PT pulses palpable       BLE hemosiderin staining        RLE w/ dried adherent scab       LLE w/ 3 dried adherent sunken eschars        w/ fading blanchable erythema w/ wrinkling and peeling skin       no blistering  or serosanguinous drainage  No odor,, increased warmth, tenderness, induration, fluctuance, nor crepitus    Skin: thin, dry, pale, frail,  ecchymosis w/o hematoma    LABS/ CULTURES/ RADIOLOGY:                        11.8   4.51  )-----------( 166      ( 09 Mar 2023 06:49 )             37.1       141  |  105  |  9   ----------------------------<  86      [03-09-23 @ 06:49]  3.6   |  25  |  0.57        Ca     9.1     [03-09-23 @ 06:49]      Mg     1.9     [03-09-23 @ 06:49]      Phos  3.2     [03-09-23 @ 06:49]      Culture - Blood (collected 03-08-23 @ 10:50)  Source: .Blood Blood-Peripheral  Preliminary Report (03-09-23 @ 18:02):    No growth to date.    Culture - Blood (collected 03-08-23 @ 10:30)  Source: .Blood Blood-Venous  Preliminary Report (03-09-23 @ 18:02):    No growth to date.          < from: Xray Ankle Complete 3 Views, Left (03.08.23 @ 11:23) >  ACC: [id number] EXAM:  XR TIB FIB AP LAT 2 VIEWS LT   ORDERED BY: XANDER TORRES     ACC: [id number] EXAM:  XR ANKLE COMP MIN 3 VIEWS LT   ORDERED BY: XANDER TORRES     PROCEDURE DATE:  03/08/2023          INTERPRETATION:  HISTORY: Ulceration,cellulitis, evaluate for   osteomyelitis    TECHNIQUE: 4 views of the left ankle; 2 views left tibia/fibula    COMPARISON: None    IMPRESSION:    Partially evaluated hardware within the distal femur and left knee   arthroplasty.    Soft tissue ulcer along the medial aspect of the distal tibia. Soft   tissue swelling about the calf and ankle. No acute displaced fracture. No   cortical erosion or periostitis to suggest osteomyelitis. Ankle mortise   is symmetric.    Atherosclerotic calcifications are noted.    < end of copied text >

## 2023-03-10 NOTE — PROGRESS NOTE ADULT - ASSESSMENT
87 year old female with dementia presents with progressive wounds to her lower extremity.  She has been afebrile with a normal white blood cell count.    Bilatral soft tissue infections are not common.    There is warmth and erythema to bilateral lower legs. Improved       No objection to treating as soft tissue infection with cefazolin    Plan a 7 day antibiotic course  Can treat with cefazolin while admitted  When stable for dc, can finsih course with keflex 500 mg po q 8 on 3/15  Wound care    Call ID service with additional questions  Follow up outpt as needed [phone]

## 2023-03-10 NOTE — DISCHARGE NOTE PROVIDER - NSDCMRMEDTOKEN_GEN_ALL_CORE_FT
acetaminophen 325 mg oral tablet: 2 tabs orally twice a day  citalopram 10 mg oral tablet: 1 tab(s) orally once a day  donepezil 10 mg oral tablet: 1 tab(s) orally once a day (at bedtime)  furosemide 20 mg oral tablet: 1 tab(s) orally once a day  Metoprolol Succinate ER 25 mg oral tablet, extended release: 1 tab(s) orally once a day  Multiple Vitamins oral tablet: 1 tab(s) orally once a day  risperiDONE 0.25 mg oral tablet: 1 tab(s) orally once a day in the morning  risperiDONE 0.5 mg oral tablet: 1 tab(s) orally once a day (at bedtime)  traZODone 50 mg oral tablet: 1 tab(s) orally once a day (at bedtime)  Vitamin B-12 1000 mcg oral tablet: 1 tab(s) orally once a day  Vitamin C 500 mg oral tablet: 1 tab(s) orally once a day   acetaminophen 325 mg oral tablet: 2 tabs orally twice a day  citalopram 10 mg oral tablet: 1 tab(s) orally once a day  donepezil 10 mg oral tablet: 1 tab(s) orally once a day (at bedtime)  furosemide 20 mg oral tablet: 1 tab(s) orally once a day  Multiple Vitamins oral tablet: 1 tab(s) orally once a day  ocular lubricant ophthalmic solution: 1 drop(s) to each affected eye 3 times a day  risperiDONE 0.25 mg oral tablet: 1 tab(s) orally once a day in the morning  risperiDONE 0.5 mg oral tablet: 1 tab(s) orally once a day (at bedtime)  traZODone 50 mg oral tablet: 1 tab(s) orally once a day (at bedtime)  Vitamin B-12 1000 mcg oral tablet: 1 tab(s) orally once a day  Vitamin C 500 mg oral tablet: 1 tab(s) orally once a day   acetaminophen 325 mg oral tablet: 2 tabs orally twice a day  ascorbic acid 500 mg oral tablet: 1 tab(s) orally once a day  citalopram 10 mg oral tablet: 1 tab(s) orally once a day  cyanocobalamin 1000 mcg oral tablet: 1 tab(s) orally once a day  donepezil 10 mg oral tablet: 1 tab(s) orally once a day (at bedtime)  metoprolol tartrate 25 mg oral tablet: 1 tab(s) orally 2 times a day  Multiple Vitamins oral tablet: 1 tab(s) orally once a day  mupirocin 2% nasal ointment: 1 dose(s) nasal 2 times a day x 2 more days  ocular lubricant ophthalmic solution: 1 drop(s) to each affected eye 3 times a day   acetaminophen 325 mg oral tablet: for mild pain and fever  ascorbic acid 500 mg oral tablet: 1 tab(s) orally once a day  cephalexin 500 mg oral capsule: 1 cap(s) orally every 12 hours   citalopram 10 mg oral tablet: 1 tab(s) orally once a day  cyanocobalamin 1000 mcg oral tablet: 1 tab(s) orally once a day  donepezil 10 mg oral tablet: 1 tab(s) orally once a day (at bedtime)  haloperidol 1 mg oral tablet: 1 tab(s) orally every 6 hours, As Needed   hyoscyamine 0.125 mg oral tablet: 1 tab(s) orally every 6 hours, As Needed   LORazepam 0.5 mg oral tablet: 1 tab(s) orally every 6 hours, As Needed MDD:MDD 4  metoprolol tartrate 25 mg oral tablet: 1 tab(s) orally 2 times a day  morphine 10 mg/0.5 mL oral solution: 0.25 milliliter(s) sublingual every 6 hours, As Needed MDD:MDD 1 ml   Multiple Vitamins oral tablet: 1 tab(s) orally once a day  mupirocin 2% nasal ointment: 1 dose(s) nasal 2 times a day x 2 more days  ocular lubricant ophthalmic solution: 1 drop(s) to each affected eye 3 times a day  prochlorperazine 10 mg oral tablet: 1 tab(s) orally every 12 hours, As Needed    acetaminophen 325 mg oral tablet: for mild pain and fever  ascorbic acid 500 mg oral tablet: 1 tab(s) orally once a day  cephalexin 500 mg oral capsule: 1 cap(s) orally every 12 hours   citalopram 10 mg oral tablet: 1 tab(s) orally once a day  cyanocobalamin 1000 mcg oral tablet: 1 tab(s) orally once a day  donepezil 10 mg oral tablet: 1 tab(s) orally once a day (at bedtime)  metoprolol tartrate 25 mg oral tablet: 1 tab(s) orally 2 times a day  Multiple Vitamins oral tablet: 1 tab(s) orally once a day  mupirocin 2% nasal ointment: 1 dose(s) nasal 2 times a day x 2 more days   acetaminophen 325 mg oral tablet: for mild pain and fever  ascorbic acid 500 mg oral tablet: 1 tab(s) orally once a day  cephalexin 500 mg oral capsule: 1 cap(s) orally every 12 hours   citalopram 10 mg oral tablet: 1 tab(s) orally once a day  cyanocobalamin 1000 mcg oral tablet: 1 tab(s) orally once a day  donepezil 10 mg oral tablet: 1 tab(s) orally once a day (at bedtime)  Multiple Vitamins oral tablet: 1 tab(s) orally once a day  mupirocin 2% nasal ointment: 1 dose(s) nasal 2 times a day x 2 more days

## 2023-03-10 NOTE — PROGRESS NOTE ADULT - ASSESSMENT
87F PMH Dementia, HTN, presents from [facility] for a LLE wound/rash noticed this morning by staff. Reportedly 2 days ago pt with dark spots on LLE ant shin but this morning the staff noticed it was red. Pt only able to give limited history as she has dementia. Per EMS, pt hasn't had fevers and is reportedly at baseline mental status per home staff.       cellulitis with lower ext ulcers  - iv ancef  - blood cultures  - ID following   Wound care     dementia  - b12, folate, tsh is normal    dispo  - return to [facility] with PT services

## 2023-03-10 NOTE — DISCHARGE NOTE PROVIDER - NSDCCPCAREPLAN_GEN_ALL_CORE_FT
PRINCIPAL DISCHARGE DIAGNOSIS  Diagnosis: Cellulitis of left lower extremity  Assessment and Plan of Treatment: Lt shin- medihoney + Allevyn dressing  BLE elevation & Compression  Upon discharge f/u as outpatient at [facility] [address] [place] [address] [phone]        SECONDARY DISCHARGE DIAGNOSES  Diagnosis: Ulcer of left lower leg, limited to breakdown of skin  Assessment and Plan of Treatment:      PRINCIPAL DISCHARGE DIAGNOSIS  Diagnosis: Cellulitis of left lower extremity  Assessment and Plan of Treatment: Received IV cefazolin, switched to po kefles through 3/15.  Cellulitis improved.  Lt shin- medihoney + Allevyn dressing  BLE elevation & Compression  Upon discharge f/u as outpatient at [facility] [address] [facility] [phone]        SECONDARY DISCHARGE DIAGNOSES  Diagnosis: Dementia  Assessment and Plan of Treatment: Assist with ADLs, ferquent orientation.     PRINCIPAL DISCHARGE DIAGNOSIS  Diagnosis: Cellulitis of left lower extremity  Assessment and Plan of Treatment: Received IV cefazolin, switched to po kefles through 3/15.  Cellulitis improved.  Lt shin- medihoney + Allevyn dressing  BLE elevation & Compression  Upon discharge f/u as outpatient at [facility] [address] [place] [address] [phone]        SECONDARY DISCHARGE DIAGNOSES  Diagnosis: Dementia  Assessment and Plan of Treatment: Assist with ADLs, ferquent orientation.    Diagnosis: HTN (hypertension)  Assessment and Plan of Treatment: Low salt diet  Activity as tolerated.  Take all medication as prescribed.  Follow up with your medical doctor for routine blood pressure monitoring at your next visit.  Notify your doctor if you have any of the following symptoms:   Dizziness, Lightheadedness, Blurry vision, Headache, Chest pain, Shortness of breath

## 2023-03-11 RX ORDER — MUPIROCIN 20 MG/G
1 OINTMENT TOPICAL
Refills: 0 | Status: DISCONTINUED | OUTPATIENT
Start: 2023-03-11 | End: 2023-03-14

## 2023-03-11 RX ADMIN — HEPARIN SODIUM 5000 UNIT(S): 5000 INJECTION INTRAVENOUS; SUBCUTANEOUS at 05:18

## 2023-03-11 RX ADMIN — HEPARIN SODIUM 5000 UNIT(S): 5000 INJECTION INTRAVENOUS; SUBCUTANEOUS at 21:23

## 2023-03-11 RX ADMIN — MUPIROCIN 1 APPLICATION(S): 20 OINTMENT TOPICAL at 17:48

## 2023-03-11 RX ADMIN — Medication 100 MILLIGRAM(S): at 05:16

## 2023-03-11 RX ADMIN — Medication 100 MILLIGRAM(S): at 21:23

## 2023-03-11 RX ADMIN — HEPARIN SODIUM 5000 UNIT(S): 5000 INJECTION INTRAVENOUS; SUBCUTANEOUS at 13:35

## 2023-03-11 RX ADMIN — Medication 100 MILLIGRAM(S): at 13:36

## 2023-03-11 RX ADMIN — CHLORHEXIDINE GLUCONATE 1 APPLICATION(S): 213 SOLUTION TOPICAL at 13:33

## 2023-03-11 NOTE — PROGRESS NOTE ADULT - ASSESSMENT
87F PMH Dementia, HTN, presents from [facility] for a LLE wound/rash noticed this morning by staff. Reportedly 2 days ago pt with dark spots on LLE ant shin but this morning the staff noticed it was red. Pt only able to give limited history as she has dementia. Per EMS, pt hasn't had fevers and is reportedly at baseline mental status per home staff.       cellulitis with lower ext ulcers  - iv ancef  - blood cultures  - ID following   Wound care     dementia  - b12, folate, tsh is normal    dispo  - return to [facility] with PT services

## 2023-03-12 RX ADMIN — MUPIROCIN 1 APPLICATION(S): 20 OINTMENT TOPICAL at 05:52

## 2023-03-12 RX ADMIN — Medication 100 MILLIGRAM(S): at 05:52

## 2023-03-12 RX ADMIN — CHLORHEXIDINE GLUCONATE 1 APPLICATION(S): 213 SOLUTION TOPICAL at 12:03

## 2023-03-12 RX ADMIN — HEPARIN SODIUM 5000 UNIT(S): 5000 INJECTION INTRAVENOUS; SUBCUTANEOUS at 05:51

## 2023-03-12 RX ADMIN — Medication 100 MILLIGRAM(S): at 21:31

## 2023-03-12 RX ADMIN — HEPARIN SODIUM 5000 UNIT(S): 5000 INJECTION INTRAVENOUS; SUBCUTANEOUS at 21:31

## 2023-03-12 RX ADMIN — Medication 1 DROP(S): at 22:47

## 2023-03-12 RX ADMIN — Medication 100 MILLIGRAM(S): at 13:38

## 2023-03-12 RX ADMIN — HEPARIN SODIUM 5000 UNIT(S): 5000 INJECTION INTRAVENOUS; SUBCUTANEOUS at 13:38

## 2023-03-12 RX ADMIN — MUPIROCIN 1 APPLICATION(S): 20 OINTMENT TOPICAL at 17:24

## 2023-03-12 NOTE — PROGRESS NOTE ADULT - ASSESSMENT
87F PMH Dementia, HTN, presents from [facility] for a LLE wound/rash noticed this morning by staff. Reportedly 2 days ago pt with dark spots on LLE ant shin but this morning the staff noticed it was red. Pt only able to give limited history as she has dementia. Per EMS, pt hasn't had fevers and is reportedly at baseline mental status per home staff.       cellulitis with lower ext ulcers  - iv ancef  - blood cultures  - ID following   Wound care   When stable for dc, can finsih course with keflex 500 mg po q 8 on 3/15  Wound care    dementia  - b12, folate, tsh is normal    dispo  - return to [facility] with PT services  D/C planning

## 2023-03-13 LAB
CULTURE RESULTS: SIGNIFICANT CHANGE UP
CULTURE RESULTS: SIGNIFICANT CHANGE UP
SARS-COV-2 RNA SPEC QL NAA+PROBE: SIGNIFICANT CHANGE UP
SPECIMEN SOURCE: SIGNIFICANT CHANGE UP
SPECIMEN SOURCE: SIGNIFICANT CHANGE UP

## 2023-03-13 PROCEDURE — 99232 SBSQ HOSP IP/OBS MODERATE 35: CPT

## 2023-03-13 RX ORDER — METOPROLOL TARTRATE 50 MG
1 TABLET ORAL
Qty: 0 | Refills: 0 | DISCHARGE

## 2023-03-13 RX ORDER — CEPHALEXIN 500 MG
1 CAPSULE ORAL
Qty: 7 | Refills: 0
Start: 2023-03-13

## 2023-03-13 RX ORDER — METOPROLOL TARTRATE 50 MG
25 TABLET ORAL
Refills: 0 | Status: DISCONTINUED | OUTPATIENT
Start: 2023-03-13 | End: 2023-03-14

## 2023-03-13 RX ORDER — CITALOPRAM 10 MG/1
10 TABLET, FILM COATED ORAL DAILY
Refills: 0 | Status: DISCONTINUED | OUTPATIENT
Start: 2023-03-13 | End: 2023-03-14

## 2023-03-13 RX ORDER — PREGABALIN 225 MG/1
1000 CAPSULE ORAL DAILY
Refills: 0 | Status: DISCONTINUED | OUTPATIENT
Start: 2023-03-13 | End: 2023-03-14

## 2023-03-13 RX ORDER — ASCORBIC ACID 60 MG
500 TABLET,CHEWABLE ORAL DAILY
Refills: 0 | Status: DISCONTINUED | OUTPATIENT
Start: 2023-03-13 | End: 2023-03-14

## 2023-03-13 RX ORDER — DONEPEZIL HYDROCHLORIDE 10 MG/1
10 TABLET, FILM COATED ORAL AT BEDTIME
Refills: 0 | Status: DISCONTINUED | OUTPATIENT
Start: 2023-03-13 | End: 2023-03-14

## 2023-03-13 RX ADMIN — Medication 1 DROP(S): at 05:22

## 2023-03-13 RX ADMIN — Medication 1 DROP(S): at 13:20

## 2023-03-13 RX ADMIN — HEPARIN SODIUM 5000 UNIT(S): 5000 INJECTION INTRAVENOUS; SUBCUTANEOUS at 05:21

## 2023-03-13 RX ADMIN — HEPARIN SODIUM 5000 UNIT(S): 5000 INJECTION INTRAVENOUS; SUBCUTANEOUS at 13:21

## 2023-03-13 RX ADMIN — Medication 100 MILLIGRAM(S): at 13:20

## 2023-03-13 RX ADMIN — Medication 100 MILLIGRAM(S): at 05:22

## 2023-03-13 RX ADMIN — Medication 500 MILLIGRAM(S): at 13:21

## 2023-03-13 RX ADMIN — DONEPEZIL HYDROCHLORIDE 10 MILLIGRAM(S): 10 TABLET, FILM COATED ORAL at 22:53

## 2023-03-13 RX ADMIN — Medication 1 TABLET(S): at 13:20

## 2023-03-13 RX ADMIN — Medication 25 MILLIGRAM(S): at 17:06

## 2023-03-13 RX ADMIN — Medication 100 MILLIGRAM(S): at 21:28

## 2023-03-13 RX ADMIN — Medication 1 DROP(S): at 21:30

## 2023-03-13 RX ADMIN — PREGABALIN 1000 MICROGRAM(S): 225 CAPSULE ORAL at 13:21

## 2023-03-13 RX ADMIN — CITALOPRAM 10 MILLIGRAM(S): 10 TABLET, FILM COATED ORAL at 13:20

## 2023-03-13 RX ADMIN — HEPARIN SODIUM 5000 UNIT(S): 5000 INJECTION INTRAVENOUS; SUBCUTANEOUS at 21:28

## 2023-03-13 RX ADMIN — MUPIROCIN 1 APPLICATION(S): 20 OINTMENT TOPICAL at 07:03

## 2023-03-13 RX ADMIN — MUPIROCIN 1 APPLICATION(S): 20 OINTMENT TOPICAL at 17:06

## 2023-03-13 RX ADMIN — CHLORHEXIDINE GLUCONATE 1 APPLICATION(S): 213 SOLUTION TOPICAL at 11:16

## 2023-03-13 NOTE — PROGRESS NOTE ADULT - ASSESSMENT
87 year old female with dementia presents with progressive wounds to her lower extremity.  She has been afebrile with a normal white blood cell count.    Bilatral soft tissue infections are not common.  Warmth and erythema to bilateral lower legs improved    No objection to treating as soft tissue infection with cefazolin    Plan a 7 day antibiotic course through 3/15  Can treat with cefazolin while admitted  When stable for dc, can finsih course with keflex 500 mg po q 8 on 3/15  Wound care    Call ID service with additional questions  Will sign off    Follow up outpt as needed [phone]

## 2023-03-13 NOTE — PROGRESS NOTE ADULT - ASSESSMENT
87F PMH Dementia, HTN, presents from [facility] for a LLE wound/rash noticed this morning by staff. Reportedly 2 days ago pt with dark spots on LLE ant shin but this morning the staff noticed it was red. Pt only able to give limited history as she has dementia. Per EMS, pt hasn't had fevers and is reportedly at baseline mental status per home staff.       cellulitis with lower ext ulcers  - iv ancef  - blood cultures  - ID following   Wound care   When stable for dc, can finsih course with keflex 500 mg po q 8 on 3/15  Wound care    dementia  - b12, folate, tsh is normal    dispo  - return to [facility] with PT services  D/C planning     87F PMH Dementia, HTN, presents from [facility] for a LLE wound/rash noticed this morning by staff. Reportedly 2 days ago pt with dark spots on LLE ant shin but this morning the staff noticed it was red. Pt only able to give limited history as she has dementia. Per EMS, pt hasn't had fevers and is reportedly at baseline mental status per home staff.       cellulitis with lower ext ulcers  - iv ancef  - blood cultures  - ID following   Wound care   When stable for dc, can finsih course with keflex 500 mg po q 8 on 3/15  Wound care    dementia  - b12, folate, tsh is normal  - pt is sleeping most of the time  - will hold trazadone and risperidone for now    HTN  - c/w mrtoprolol    dispo  - return to [facility] with PT services  D/C planning

## 2023-03-14 ENCOUNTER — TRANSCRIPTION ENCOUNTER (OUTPATIENT)
Age: 88
End: 2023-03-14

## 2023-03-14 VITALS — HEART RATE: 93 BPM | SYSTOLIC BLOOD PRESSURE: 139 MMHG | DIASTOLIC BLOOD PRESSURE: 89 MMHG

## 2023-03-14 PROCEDURE — 82435 ASSAY OF BLOOD CHLORIDE: CPT

## 2023-03-14 PROCEDURE — 84443 ASSAY THYROID STIM HORMONE: CPT

## 2023-03-14 PROCEDURE — 82962 GLUCOSE BLOOD TEST: CPT

## 2023-03-14 PROCEDURE — 87640 STAPH A DNA AMP PROBE: CPT

## 2023-03-14 PROCEDURE — 84145 PROCALCITONIN (PCT): CPT

## 2023-03-14 PROCEDURE — 86140 C-REACTIVE PROTEIN: CPT

## 2023-03-14 PROCEDURE — 85652 RBC SED RATE AUTOMATED: CPT

## 2023-03-14 PROCEDURE — 96365 THER/PROPH/DIAG IV INF INIT: CPT

## 2023-03-14 PROCEDURE — 85025 COMPLETE CBC W/AUTO DIFF WBC: CPT

## 2023-03-14 PROCEDURE — 80053 COMPREHEN METABOLIC PANEL: CPT

## 2023-03-14 PROCEDURE — 82330 ASSAY OF CALCIUM: CPT

## 2023-03-14 PROCEDURE — 80048 BASIC METABOLIC PNL TOTAL CA: CPT

## 2023-03-14 PROCEDURE — 84100 ASSAY OF PHOSPHORUS: CPT

## 2023-03-14 PROCEDURE — 73590 X-RAY EXAM OF LOWER LEG: CPT

## 2023-03-14 PROCEDURE — 82607 VITAMIN B-12: CPT

## 2023-03-14 PROCEDURE — U0003: CPT

## 2023-03-14 PROCEDURE — 99285 EMERGENCY DEPT VISIT HI MDM: CPT | Mod: 25

## 2023-03-14 PROCEDURE — 84132 ASSAY OF SERUM POTASSIUM: CPT

## 2023-03-14 PROCEDURE — 96367 TX/PROPH/DG ADDL SEQ IV INF: CPT

## 2023-03-14 PROCEDURE — 87635 SARS-COV-2 COVID-19 AMP PRB: CPT

## 2023-03-14 PROCEDURE — 87040 BLOOD CULTURE FOR BACTERIA: CPT

## 2023-03-14 PROCEDURE — 85027 COMPLETE CBC AUTOMATED: CPT

## 2023-03-14 PROCEDURE — 97162 PT EVAL MOD COMPLEX 30 MIN: CPT

## 2023-03-14 PROCEDURE — 82803 BLOOD GASES ANY COMBINATION: CPT

## 2023-03-14 PROCEDURE — 96366 THER/PROPH/DIAG IV INF ADDON: CPT

## 2023-03-14 PROCEDURE — 83735 ASSAY OF MAGNESIUM: CPT

## 2023-03-14 PROCEDURE — 85014 HEMATOCRIT: CPT

## 2023-03-14 PROCEDURE — U0005: CPT

## 2023-03-14 PROCEDURE — 82947 ASSAY GLUCOSE BLOOD QUANT: CPT

## 2023-03-14 PROCEDURE — 84295 ASSAY OF SERUM SODIUM: CPT

## 2023-03-14 PROCEDURE — 85018 HEMOGLOBIN: CPT

## 2023-03-14 PROCEDURE — 82746 ASSAY OF FOLIC ACID SERUM: CPT

## 2023-03-14 PROCEDURE — 97110 THERAPEUTIC EXERCISES: CPT

## 2023-03-14 PROCEDURE — 87641 MR-STAPH DNA AMP PROBE: CPT

## 2023-03-14 PROCEDURE — 73610 X-RAY EXAM OF ANKLE: CPT

## 2023-03-14 PROCEDURE — 97116 GAIT TRAINING THERAPY: CPT

## 2023-03-14 PROCEDURE — 83605 ASSAY OF LACTIC ACID: CPT

## 2023-03-14 RX ORDER — METOPROLOL TARTRATE 50 MG
1 TABLET ORAL
Qty: 0 | Refills: 0 | DISCHARGE
Start: 2023-03-14

## 2023-03-14 RX ORDER — HYOSCYAMINE SULFATE 0.13 MG
1 TABLET ORAL
Qty: 60 | Refills: 0
Start: 2023-03-14 | End: 2023-03-28

## 2023-03-14 RX ORDER — ASCORBIC ACID 60 MG
1 TABLET,CHEWABLE ORAL
Qty: 0 | Refills: 0 | DISCHARGE

## 2023-03-14 RX ORDER — ACETAMINOPHEN 500 MG
1 TABLET ORAL
Qty: 120 | Refills: 0
Start: 2023-03-14 | End: 2023-04-12

## 2023-03-14 RX ORDER — PREGABALIN 225 MG/1
1 CAPSULE ORAL
Qty: 0 | Refills: 0 | DISCHARGE

## 2023-03-14 RX ORDER — DONEPEZIL HYDROCHLORIDE 10 MG/1
1 TABLET, FILM COATED ORAL
Qty: 0 | Refills: 0 | DISCHARGE
Start: 2023-03-14

## 2023-03-14 RX ORDER — RISPERIDONE 4 MG/1
1 TABLET ORAL
Qty: 0 | Refills: 0 | DISCHARGE

## 2023-03-14 RX ORDER — MORPHINE SULFATE 50 MG/1
0.25 CAPSULE, EXTENDED RELEASE ORAL
Qty: 7 | Refills: 0
Start: 2023-03-14 | End: 2023-03-20

## 2023-03-14 RX ORDER — HALOPERIDOL DECANOATE 100 MG/ML
1 INJECTION INTRAMUSCULAR
Qty: 60 | Refills: 0
Start: 2023-03-14 | End: 2023-03-28

## 2023-03-14 RX ORDER — PREGABALIN 225 MG/1
1 CAPSULE ORAL
Qty: 0 | Refills: 0 | DISCHARGE
Start: 2023-03-14

## 2023-03-14 RX ORDER — DONEPEZIL HYDROCHLORIDE 10 MG/1
1 TABLET, FILM COATED ORAL
Qty: 0 | Refills: 0 | DISCHARGE

## 2023-03-14 RX ORDER — PROCHLORPERAZINE MALEATE 5 MG
1 TABLET ORAL
Qty: 30 | Refills: 0
Start: 2023-03-14 | End: 2023-03-28

## 2023-03-14 RX ORDER — ASCORBIC ACID 60 MG
1 TABLET,CHEWABLE ORAL
Qty: 0 | Refills: 0 | DISCHARGE
Start: 2023-03-14

## 2023-03-14 RX ADMIN — MUPIROCIN 1 APPLICATION(S): 20 OINTMENT TOPICAL at 17:08

## 2023-03-14 RX ADMIN — HEPARIN SODIUM 5000 UNIT(S): 5000 INJECTION INTRAVENOUS; SUBCUTANEOUS at 05:12

## 2023-03-14 RX ADMIN — Medication 25 MILLIGRAM(S): at 17:07

## 2023-03-14 RX ADMIN — Medication 1 DROP(S): at 14:03

## 2023-03-14 RX ADMIN — Medication 100 MILLIGRAM(S): at 14:02

## 2023-03-14 RX ADMIN — Medication 25 MILLIGRAM(S): at 05:12

## 2023-03-14 RX ADMIN — CITALOPRAM 10 MILLIGRAM(S): 10 TABLET, FILM COATED ORAL at 11:22

## 2023-03-14 RX ADMIN — Medication 1 TABLET(S): at 11:22

## 2023-03-14 RX ADMIN — CHLORHEXIDINE GLUCONATE 1 APPLICATION(S): 213 SOLUTION TOPICAL at 11:23

## 2023-03-14 RX ADMIN — Medication 500 MILLIGRAM(S): at 11:22

## 2023-03-14 RX ADMIN — HEPARIN SODIUM 5000 UNIT(S): 5000 INJECTION INTRAVENOUS; SUBCUTANEOUS at 14:03

## 2023-03-14 RX ADMIN — Medication 100 MILLIGRAM(S): at 05:12

## 2023-03-14 RX ADMIN — MUPIROCIN 1 APPLICATION(S): 20 OINTMENT TOPICAL at 05:14

## 2023-03-14 RX ADMIN — Medication 1 DROP(S): at 05:13

## 2023-03-14 RX ADMIN — PREGABALIN 1000 MICROGRAM(S): 225 CAPSULE ORAL at 11:22

## 2023-03-14 NOTE — PROGRESS NOTE ADULT - PROVIDER SPECIALTY LIST ADULT
Internal Medicine
Internal Medicine
Infectious Disease
Infectious Disease
Internal Medicine
Internal Medicine
Infectious Disease
Internal Medicine
Internal Medicine

## 2023-03-14 NOTE — PROGRESS NOTE ADULT - SUBJECTIVE AND OBJECTIVE BOX
DATE OF SERVICE: 03-14-23 @ 10:35    Patient is a 87y old  Female who presents with a chief complaint of cellulitis (13 Mar 2023 10:32)      SUBJECTIVE / OVERNIGHT EVENTS:  pt is calm and comfortable feeding herself.  very [place]    MEDICATIONS  (STANDING):  artificial  tears Solution 1 Drop(s) Both EYES three times a day  ascorbic acid 500 milliGRAM(s) Oral daily  ceFAZolin   IVPB      ceFAZolin   IVPB 1000 milliGRAM(s) IV Intermittent every 8 hours  chlorhexidine 2% Cloths 1 Application(s) Topical daily  citalopram 10 milliGRAM(s) Oral daily  cyanocobalamin 1000 MICROGram(s) Oral daily  donepezil 10 milliGRAM(s) Oral at bedtime  heparin   Injectable 5000 Unit(s) SubCutaneous every 8 hours  metoprolol tartrate 25 milliGRAM(s) Oral two times a day  multivitamin 1 Tablet(s) Oral daily  mupirocin 2% Nasal 1 Application(s) Both Nostrils two times a day    MEDICATIONS  (PRN):      Vital Signs Last 24 Hrs  T(C): 36.5 (14 Mar 2023 09:06), Max: 37.3 (13 Mar 2023 22:44)  T(F): 97.7 (14 Mar 2023 09:06), Max: 99.1 (13 Mar 2023 22:44)  HR: 84 (14 Mar 2023 09:06) (83 - 107)  BP: 121/89 (14 Mar 2023 09:06) (115/75 - 137/97)  BP(mean): --  RR: 18 (14 Mar 2023 09:06) (18 - 18)  SpO2: 97% (14 Mar 2023 09:06) (95% - 99%)    Parameters below as of 14 Mar 2023 09:06  Patient On (Oxygen Delivery Method): room air      CAPILLARY BLOOD GLUCOSE        I&O's Summary      PHYSICAL EXAM:  GENERAL: NAD, well-developed  HEAD:  Atraumatic, Normocephalic  EYES: EOMI, PERRLA, conjunctiva and sclera clear  NECK: Supple, No JVD  CHEST/LUNG: Clear to auscultation bilaterally; No wheeze  HEART: Regular rate and rhythm; No murmurs, rubs, or gallops  ABDOMEN: Soft, Nontender, Nondistended; Bowel sounds present  EXTREMITIES:  2+ Peripheral Pulses, No clubbing, cyanosis, or edema  PSYCH: AAOx1  NEUROLOGY: non-focal  SKIN: No rashes or lesions    LABS:                    RADIOLOGY & ADDITIONAL TESTS:    Imaging Personally Reviewed:    Consultant(s) Notes Reviewed:      Care Discussed with Consultants/Other Providers:  
Patient is a 87y old  Female who presents with a chief complaint of cellulitis (08 Mar 2023 16:49)      SUBJECTIVE / OVERNIGHT EVENTS:  No chest pain. No shortness of breath. No complaints. No events overnight.       T(C): 36.8 (03-13-23 @ 00:59), Max: 36.8 (03-13-23 @ 00:59)  HR: 107 (03-13-23 @ 00:59) (107 - 117)  BP: 136/93 (03-13-23 @ 00:59) (136/93 - 136/95)  RR: 18 (03-13-23 @ 00:59) (18 - 18)  SpO2: 95% (03-13-23 @ 00:59) (94% - 95%)      MEDICATIONS  (STANDING):  artificial  tears Solution 1 Drop(s) Both EYES three times a day  ceFAZolin   IVPB      ceFAZolin   IVPB 1000 milliGRAM(s) IV Intermittent every 8 hours  chlorhexidine 2% Cloths 1 Application(s) Topical daily  heparin   Injectable 5000 Unit(s) SubCutaneous every 8 hours  mupirocin 2% Nasal 1 Application(s) Both Nostrils two times a day    MEDICATIONS  (PRN):    PHYSICAL EXAM:  GENERAL: NAD, well-developed  HEAD:  Atraumatic, Normocephalic  EYES: EOMI, conjunctiva and sclera clear  NECK: Supple, No JVD  CHEST/LUNG: Clear to auscultation bilaterally; No wheeze  HEART: Regular rate and rhythm; No murmurs, rubs, or gallops  ABDOMEN: Soft, Nontender, Nondistended; Bowel sounds present  EXTREMITIES:  2+ Peripheral Pulses, No clubbing, cyanosis, or edema  PSYCH: AAOx1  NEUROLOGY: non-focal  SKIN: improved erythema of left shin                            11.8   4.51  )-----------( 166      ( 09 Mar 2023 06:49 )             37.1       CAPILLARY BLOOD GLUCOSE          LIVER FUNCTIONS - ( 08 Mar 2023 11:17 )  Alb: 3.6 g/dL / Pro: 6.5 g/dL / ALK PHOS: 114 U/L / ALT: 17 U/L / AST: 11 U/L / GGT: x                         RADIOLOGY & ADDITIONAL TESTS:    Imaging Personally Reviewed:    Consultant(s) Notes Reviewed:      Care Discussed with Consultants/Other Providers:  
Patient is a 87y old  Female who presents with a chief complaint of cellulitis (08 Mar 2023 16:49)      SUBJECTIVE / OVERNIGHT EVENTS:  No chest pain. No shortness of breath. No complaints. No events overnight.       T(C): 36.8 (03-13-23 @ 00:59), Max: 36.8 (03-13-23 @ 00:59)  HR: 107 (03-13-23 @ 00:59) (107 - 117)  BP: 136/93 (03-13-23 @ 00:59) (136/93 - 136/95)  RR: 18 (03-13-23 @ 00:59) (18 - 18)  SpO2: 95% (03-13-23 @ 00:59) (94% - 95%)      MEDICATIONS  (STANDING):  artificial  tears Solution 1 Drop(s) Both EYES three times a day  ceFAZolin   IVPB      ceFAZolin   IVPB 1000 milliGRAM(s) IV Intermittent every 8 hours  chlorhexidine 2% Cloths 1 Application(s) Topical daily  heparin   Injectable 5000 Unit(s) SubCutaneous every 8 hours  mupirocin 2% Nasal 1 Application(s) Both Nostrils two times a day    MEDICATIONS  (PRN):    PHYSICAL EXAM:  GENERAL: NAD, well-developed  HEAD:  Atraumatic, Normocephalic  EYES: EOMI, conjunctiva and sclera clear  NECK: Supple, No JVD  CHEST/LUNG: Clear to auscultation bilaterally; No wheeze  HEART: Regular rate and rhythm; No murmurs, rubs, or gallops  ABDOMEN: Soft, Nontender, Nondistended; Bowel sounds present  EXTREMITIES:  2+ Peripheral Pulses, No clubbing, cyanosis, or edema  PSYCH: AAOx1  NEUROLOGY: non-focal  SKIN: improved erythema of left shin                            11.8   4.51  )-----------( 166      ( 09 Mar 2023 06:49 )             37.1       CAPILLARY BLOOD GLUCOSE          LIVER FUNCTIONS - ( 08 Mar 2023 11:17 )  Alb: 3.6 g/dL / Pro: 6.5 g/dL / ALK PHOS: 114 U/L / ALT: 17 U/L / AST: 11 U/L / GGT: x                         RADIOLOGY & ADDITIONAL TESTS:    Imaging Personally Reviewed:    Consultant(s) Notes Reviewed:      Care Discussed with Consultants/Other Providers:  
Follow Up:      Inverval History/ROS:Patient is a 87y old  Female who presents with a chief complaint of cellulitis (09 Mar 2023 13:17)    No fever      Allergies    No Known Allergies    Intolerances        ANTIMICROBIALS:  ceFAZolin   IVPB    ceFAZolin   IVPB 1000 every 8 hours      OTHER MEDS:  heparin   Injectable 5000 Unit(s) SubCutaneous every 8 hours      Vital Signs Last 24 Hrs  T(C): 37.2 (09 Mar 2023 16:02), Max: 37.2 (09 Mar 2023 16:02)  T(F): 99 (09 Mar 2023 16:02), Max: 99 (09 Mar 2023 16:02)  HR: 85 (09 Mar 2023 16:02) (64 - 85)  BP: 136/81 (09 Mar 2023 16:02) (114/66 - 147/83)  BP(mean): --  RR: 18 (09 Mar 2023 16:02) (18 - 18)  SpO2: 96% (09 Mar 2023 16:02) (95% - 96%)    Parameters below as of 09 Mar 2023 16:02  Patient On (Oxygen Delivery Method): room air        PHYSICAL EXAM:  General: [x ] non-toxic  HEAD/EYES: [ ] PERRL [x ] white sclera [ ] icterus  ENT:  [ ] normal [x ] supple [ ] thrush [ ] pharyngeal exudate  Cardiovascular:   [ ] murmur [x ] normal [ ] PPM/AICD  Respiratory:  [x ] clear to ausculation bilaterally  GI:  [ x] soft, non-tender, normal bowel sounds  :  [ ] devi [ ] no CVA tenderness   Musculoskeletal:  [ ] no synovitis  Neurologic:  [ ] non-focal exam   Skin:  [x ] legs less red  still has 3 areas left leg, one area to right leg with superficial necrosis  Lymph: [x ] no lymphadenopathy  Psychiatric:  [ ] appropriate affect [ ] alert & oriented  Lines:  [x ] no phlebitis [ ] central line                                11.8   4.51  )-----------( 166      ( 09 Mar 2023 06:49 )             37.1       03-09    141  |  105  |  9   ----------------------------<  86  3.6   |  25  |  0.57    Ca    9.1      09 Mar 2023 06:49  Phos  3.2     03-09  Mg     1.9     03-09    TPro  6.5  /  Alb  3.6  /  TBili  0.4  /  DBili  x   /  AST  11  /  ALT  17  /  AlkPhos  114  03-08          MICROBIOLOGY:Culture Results:   No growth to date. (03-08-23 @ 10:50)  Culture Results:   No growth to date. (03-08-23 @ 10:30)      RADIOLOGY:    
DATE OF SERVICE: 03-09-23 @ 13:18    Patient is a 87y old  Female who presents with a chief complaint of cellulitis (08 Mar 2023 16:49)      SUBJECTIVE / OVERNIGHT EVENTS:  No chest pain. No shortness of breath. No complaints. No events overnight.     MEDICATIONS  (STANDING):  ceFAZolin   IVPB      ceFAZolin   IVPB 1000 milliGRAM(s) IV Intermittent every 8 hours  heparin   Injectable 5000 Unit(s) SubCutaneous every 8 hours    MEDICATIONS  (PRN):      Vital Signs Last 24 Hrs  T(C): 36.7 (09 Mar 2023 09:06), Max: 36.9 (08 Mar 2023 18:43)  T(F): 98.1 (09 Mar 2023 09:06), Max: 98.4 (08 Mar 2023 18:43)  HR: 76 (09 Mar 2023 11:22) (64 - 78)  BP: 114/66 (09 Mar 2023 11:22) (113/99 - 148/79)  BP(mean): 105 (08 Mar 2023 15:30) (85 - 105)  RR: 18 (09 Mar 2023 09:06) (18 - 20)  SpO2: 95% (09 Mar 2023 09:06) (95% - 100%)    Parameters below as of 09 Mar 2023 09:06  Patient On (Oxygen Delivery Method): room air      CAPILLARY BLOOD GLUCOSE        I&O's Summary    08 Mar 2023 07:01  -  09 Mar 2023 07:00  --------------------------------------------------------  IN: 0 mL / OUT: 600 mL / NET: -600 mL        PHYSICAL EXAM:  GENERAL: NAD, well-developed  HEAD:  Atraumatic, Normocephalic  EYES: EOMI, PERRLA, conjunctiva and sclera clear  NECK: Supple, No JVD  CHEST/LUNG: Clear to auscultation bilaterally; No wheeze  HEART: Regular rate and rhythm; No murmurs, rubs, or gallops  ABDOMEN: Soft, Nontender, Nondistended; Bowel sounds present  EXTREMITIES:  2+ Peripheral Pulses, No clubbing, cyanosis, or edema  PSYCH: AAOx1  NEUROLOGY: non-focal  SKIN: improved erythema of left shin    LABS:                        11.8   4.51  )-----------( 166      ( 09 Mar 2023 06:49 )             37.1     03-09    141  |  105  |  9   ----------------------------<  86  3.6   |  25  |  0.57    Ca    9.1      09 Mar 2023 06:49  Phos  3.2     03-09  Mg     1.9     03-09    TPro  6.5  /  Alb  3.6  /  TBili  0.4  /  DBili  x   /  AST  11  /  ALT  17  /  AlkPhos  114  03-08              RADIOLOGY & ADDITIONAL TESTS:    Imaging Personally Reviewed:    Consultant(s) Notes Reviewed:      Care Discussed with Consultants/Other Providers:  
DATE OF SERVICE: 03-13-23 @ 10:09    Patient is a 87y old  Female who presents with a chief complaint of cellulitis (12 Mar 2023 21:35)      SUBJECTIVE / OVERNIGHT EVENTS:  No chest pain. No shortness of breath. No complaints. No events overnight.     MEDICATIONS  (STANDING):  artificial  tears Solution 1 Drop(s) Both EYES three times a day  ceFAZolin   IVPB      ceFAZolin   IVPB 1000 milliGRAM(s) IV Intermittent every 8 hours  chlorhexidine 2% Cloths 1 Application(s) Topical daily  heparin   Injectable 5000 Unit(s) SubCutaneous every 8 hours  mupirocin 2% Nasal 1 Application(s) Both Nostrils two times a day    MEDICATIONS  (PRN):      Vital Signs Last 24 Hrs  T(C): 36.9 (13 Mar 2023 09:44), Max: 36.9 (13 Mar 2023 09:44)  T(F): 98.5 (13 Mar 2023 09:44), Max: 98.5 (13 Mar 2023 09:44)  HR: 111 (13 Mar 2023 09:44) (102 - 117)  BP: 132/96 (13 Mar 2023 09:44) (132/96 - 136/95)  BP(mean): --  RR: 18 (13 Mar 2023 09:44) (18 - 18)  SpO2: 97% (13 Mar 2023 09:44) (94% - 97%)    Parameters below as of 13 Mar 2023 09:44  Patient On (Oxygen Delivery Method): room air      CAPILLARY BLOOD GLUCOSE        I&O's Summary      PHYSICAL EXAM:  GENERAL: NAD, well-developed  HEAD:  Atraumatic, Normocephalic  EYES: EOMI, PERRLA, conjunctiva and sclera clear  NECK: Supple, No JVD  CHEST/LUNG: Clear to auscultation bilaterally; No wheeze  HEART: Regular rate and rhythm; No murmurs, rubs, or gallops  ABDOMEN: Soft, Nontender, Nondistended; Bowel sounds present  EXTREMITIES:  2+ Peripheral Pulses, No clubbing, cyanosis, or edema  PSYCH: AAOx1  NEUROLOGY: non-focal  SKIN: No rashes or lesions    LABS:                    RADIOLOGY & ADDITIONAL TESTS:    Imaging Personally Reviewed:    Consultant(s) Notes Reviewed:      Care Discussed with Consultants/Other Providers:  
Follow Up:      Inverval History/ROS:Patient is a 87y old  Female who presents with a chief complaint of cellulitis (10 Mar 2023 13:34)    No fever  No events    Allergies    No Known Allergies    Intolerances        ANTIMICROBIALS:  ceFAZolin   IVPB    ceFAZolin   IVPB 1000 every 8 hours      OTHER MEDS:  heparin   Injectable 5000 Unit(s) SubCutaneous every 8 hours      Vital Signs Last 24 Hrs  T(C): 36.7 (10 Mar 2023 07:47), Max: 36.8 (09 Mar 2023 23:26)  T(F): 98 (10 Mar 2023 07:47), Max: 98.3 (09 Mar 2023 23:26)  HR: 73 (10 Mar 2023 07:47) (73 - 86)  BP: 134/82 (10 Mar 2023 07:47) (120/78 - 134/82)  BP(mean): --  RR: 18 (10 Mar 2023 07:47) (18 - 18)  SpO2: 96% (10 Mar 2023 07:47) (94% - 96%)    Parameters below as of 10 Mar 2023 07:47  Patient On (Oxygen Delivery Method): room air        PHYSICAL EXAM:  General: [ ] non-toxic  HEAD/EYES: [ ] PERRL [ x] white sclera [ ] icterus  ENT:  [ ] normal [x ] supple [ ] thrush [ ] pharyngeal exudate  Cardiovascular:   [ ] murmur x[ ] normal [ ] PPM/AICD  Respiratory:  [x ] clear to ausculation bilaterally  GI:  [x ] soft, non-tender, normal bowel sounds  :  [ ] devi [ ] no CVA tenderness   Musculoskeletal:  [ ] no synovitis  Neurologic:  [ ] non-focal exam   Skin:  [x ] less erythema to bilateral legs, still has multiple small open wounds with superificial necrosis  Lymph: [ ] no lymphadenopathy  Psychiatric:  [ ] appropriate affect [ ] alert & oriented  Lines:  [ ] no phlebitis [ ] central line                                11.8   4.51  )-----------( 166      ( 09 Mar 2023 06:49 )             37.1       03-09    141  |  105  |  9   ----------------------------<  86  3.6   |  25  |  0.57    Ca    9.1      09 Mar 2023 06:49  Phos  3.2     03-09  Mg     1.9     03-09            MICROBIOLOGY:Culture Results:   No growth to date. (03-08-23 @ 10:50)  Culture Results:   No growth to date. (03-08-23 @ 10:30)      RADIOLOGY:    
Follow Up:      Inverval History/ROS:Patient is a 87y old  Female who presents with a chief complaint of cellulitis (13 Mar 2023 10:09)    No fever  No events    Allergies    No Known Allergies    Intolerances        ANTIMICROBIALS:  ceFAZolin   IVPB    ceFAZolin   IVPB 1000 every 8 hours      OTHER MEDS:  artificial  tears Solution 1 Drop(s) Both EYES three times a day  chlorhexidine 2% Cloths 1 Application(s) Topical daily  heparin   Injectable 5000 Unit(s) SubCutaneous every 8 hours  mupirocin 2% Nasal 1 Application(s) Both Nostrils two times a day      Vital Signs Last 24 Hrs  T(C): 36.9 (13 Mar 2023 09:44), Max: 36.9 (13 Mar 2023 09:44)  T(F): 98.5 (13 Mar 2023 09:44), Max: 98.5 (13 Mar 2023 09:44)  HR: 111 (13 Mar 2023 09:44) (102 - 117)  BP: 132/96 (13 Mar 2023 09:44) (132/96 - 136/95)  BP(mean): --  RR: 18 (13 Mar 2023 09:44) (18 - 18)  SpO2: 97% (13 Mar 2023 09:44) (94% - 97%)    Parameters below as of 13 Mar 2023 09:44  Patient On (Oxygen Delivery Method): room air        PHYSICAL EXAM:  General: [ ] non-toxic  HEAD/EYES: [ ] PERRL [x ] white sclera [ ] icterus  ENT:  [ ] normal [x ] supple [ ] thrush [ ] pharyngeal exudate  Cardiovascular:   [ ] murmur [ x] normal [ ] PPM/AICD  Respiratory:  [ ] clear to ausculation bilaterally  GI:  [x ] soft, non-tender, normal bowel sounds  :  [ ] devi [ ] no CVA tenderness   Musculoskeletal:  [ ] no synovitis  Neurologic:  [ ] non-focal exam   Skin:  [x]erythema resolved, superficial necrosis over ulcers looks better  Lymph: [x ] no lymphadenopathy  Psychiatric:  [ ] appropriate affect [ ] alert & oriented  Lines:  [x ] no phlebitis [ ] central line                          MICROBIOLOGY:Culture Results:   No growth to date. (03-08-23 @ 10:50)  Culture Results:   No growth to date. (03-08-23 @ 10:30)      RADIOLOGY:    
Patient is a 87y old  Female who presents with a chief complaint of cellulitis (08 Mar 2023 16:49)      SUBJECTIVE / OVERNIGHT EVENTS:  No chest pain. No shortness of breath. No complaints. No events overnight.     T(C): 36.7 (03-10-23 @ 07:47), Max: 36.8 (03-09-23 @ 23:26)  HR: 73 (03-10-23 @ 07:47) (73 - 86)  BP: 134/82 (03-10-23 @ 07:47) (120/78 - 134/82)  RR: 18 (03-10-23 @ 07:47) (18 - 18)  SpO2: 96% (03-10-23 @ 07:47) (94% - 96%)    MEDICATIONS  (STANDING):  ceFAZolin   IVPB      ceFAZolin   IVPB 1000 milliGRAM(s) IV Intermittent every 8 hours  heparin   Injectable 5000 Unit(s) SubCutaneous every 8 hours    MEDICATIONS  (PRN):        PHYSICAL EXAM:  GENERAL: NAD, well-developed  HEAD:  Atraumatic, Normocephalic  EYES: EOMI, conjunctiva and sclera clear  NECK: Supple, No JVD  CHEST/LUNG: Clear to auscultation bilaterally; No wheeze  HEART: Regular rate and rhythm; No murmurs, rubs, or gallops  ABDOMEN: Soft, Nontender, Nondistended; Bowel sounds present  EXTREMITIES:  2+ Peripheral Pulses, No clubbing, cyanosis, or edema  PSYCH: AAOx1  NEUROLOGY: non-focal  SKIN: improved erythema of left shin                            11.8   4.51  )-----------( 166      ( 09 Mar 2023 06:49 )             37.1       CAPILLARY BLOOD GLUCOSE          LIVER FUNCTIONS - ( 08 Mar 2023 11:17 )  Alb: 3.6 g/dL / Pro: 6.5 g/dL / ALK PHOS: 114 U/L / ALT: 17 U/L / AST: 11 U/L / GGT: x                         RADIOLOGY & ADDITIONAL TESTS:    Imaging Personally Reviewed:    Consultant(s) Notes Reviewed:      Care Discussed with Consultants/Other Providers:

## 2023-03-14 NOTE — DISCHARGE NOTE NURSING/CASE MANAGEMENT/SOCIAL WORK - NSDCFUADDAPPT_GEN_ALL_CORE_FT
APPTS ARE READY TO BE MADE: [ x] YES    Best Family or Patient Contact (if needed):    Additional Information about above appointments (if needed):    1:   2:   3:     Other comments or requests:     Patient is being discharged to [facility]. Caregiver will arrange follow up.

## 2023-03-14 NOTE — DISCHARGE NOTE NURSING/CASE MANAGEMENT/SOCIAL WORK - NSDCPEFALRISK_GEN_ALL_CORE
For information on Fall & Injury Prevention, visit: [email or web address] OR  [email or web address] OR  https://www.cdc.gov/steadi/patient.html

## 2023-03-14 NOTE — DISCHARGE NOTE NURSING/CASE MANAGEMENT/SOCIAL WORK - PATIENT PORTAL LINK FT
You can access the FollowMyHealth Patient Portal offered by [facility] by registering at the following website: [email or web address]. By joining [employer]’s FollowMyHealth portal, you will also be able to view your health information using other applications (apps) compatible with our system.

## 2023-05-04 ENCOUNTER — INPATIENT (INPATIENT)
Facility: HOSPITAL | Age: 88
LOS: 6 days | Discharge: NURSING, REHAB OR LONG-TERM CARE | DRG: 552 | End: 2023-05-11
Attending: INTERNAL MEDICINE | Admitting: INTERNAL MEDICINE
Payer: OTHER MISCELLANEOUS

## 2023-05-04 VITALS
HEIGHT: 62 IN | OXYGEN SATURATION: 97 % | DIASTOLIC BLOOD PRESSURE: 72 MMHG | HEART RATE: 103 BPM | WEIGHT: 100.09 LBS | RESPIRATION RATE: 18 BRPM | SYSTOLIC BLOOD PRESSURE: 104 MMHG

## 2023-05-04 DIAGNOSIS — Z96.641 PRESENCE OF RIGHT ARTIFICIAL HIP JOINT: Chronic | ICD-10-CM

## 2023-05-04 DIAGNOSIS — Z29.9 ENCOUNTER FOR PROPHYLACTIC MEASURES, UNSPECIFIED: ICD-10-CM

## 2023-05-04 DIAGNOSIS — S42.309A UNSPECIFIED FRACTURE OF SHAFT OF HUMERUS, UNSPECIFIED ARM, INITIAL ENCOUNTER FOR CLOSED FRACTURE: ICD-10-CM

## 2023-05-04 DIAGNOSIS — Z96.652 PRESENCE OF LEFT ARTIFICIAL KNEE JOINT: Chronic | ICD-10-CM

## 2023-05-04 DIAGNOSIS — F03.90 UNSPECIFIED DEMENTIA WITHOUT BEHAVIORAL DISTURBANCE: ICD-10-CM

## 2023-05-04 DIAGNOSIS — S32.9XXA FRACTURE OF UNSPECIFIED PARTS OF LUMBOSACRAL SPINE AND PELVIS, INITIAL ENCOUNTER FOR CLOSED FRACTURE: ICD-10-CM

## 2023-05-04 LAB
ALBUMIN SERPL ELPH-MCNC: 3.1 G/DL — LOW (ref 3.3–5)
ALP SERPL-CCNC: 126 U/L — HIGH (ref 40–120)
ALT FLD-CCNC: 41 U/L — SIGNIFICANT CHANGE UP (ref 10–45)
ANION GAP SERPL CALC-SCNC: 10 MMOL/L — SIGNIFICANT CHANGE UP (ref 5–17)
AST SERPL-CCNC: 20 U/L — SIGNIFICANT CHANGE UP (ref 10–40)
BASOPHILS # BLD AUTO: 0.02 K/UL — SIGNIFICANT CHANGE UP (ref 0–0.2)
BASOPHILS NFR BLD AUTO: 0.3 % — SIGNIFICANT CHANGE UP (ref 0–2)
BILIRUB SERPL-MCNC: 0.9 MG/DL — SIGNIFICANT CHANGE UP (ref 0.2–1.2)
BUN SERPL-MCNC: 27 MG/DL — HIGH (ref 7–23)
CALCIUM SERPL-MCNC: 8.6 MG/DL — SIGNIFICANT CHANGE UP (ref 8.4–10.5)
CHLORIDE SERPL-SCNC: 112 MMOL/L — HIGH (ref 96–108)
CO2 SERPL-SCNC: 24 MMOL/L — SIGNIFICANT CHANGE UP (ref 22–31)
CREAT SERPL-MCNC: 0.51 MG/DL — SIGNIFICANT CHANGE UP (ref 0.5–1.3)
EGFR: 91 ML/MIN/1.73M2 — SIGNIFICANT CHANGE UP
EOSINOPHIL # BLD AUTO: 0.08 K/UL — SIGNIFICANT CHANGE UP (ref 0–0.5)
EOSINOPHIL NFR BLD AUTO: 1 % — SIGNIFICANT CHANGE UP (ref 0–6)
GLUCOSE SERPL-MCNC: 155 MG/DL — HIGH (ref 70–99)
HCT VFR BLD CALC: 33.2 % — LOW (ref 34.5–45)
HGB BLD-MCNC: 10.3 G/DL — LOW (ref 11.5–15.5)
IMM GRANULOCYTES NFR BLD AUTO: 0.6 % — SIGNIFICANT CHANGE UP (ref 0–0.9)
LYMPHOCYTES # BLD AUTO: 1.54 K/UL — SIGNIFICANT CHANGE UP (ref 1–3.3)
LYMPHOCYTES # BLD AUTO: 19.7 % — SIGNIFICANT CHANGE UP (ref 13–44)
MCHC RBC-ENTMCNC: 30.8 PG — SIGNIFICANT CHANGE UP (ref 27–34)
MCHC RBC-ENTMCNC: 31 GM/DL — LOW (ref 32–36)
MCV RBC AUTO: 99.4 FL — SIGNIFICANT CHANGE UP (ref 80–100)
MONOCYTES # BLD AUTO: 0.7 K/UL — SIGNIFICANT CHANGE UP (ref 0–0.9)
MONOCYTES NFR BLD AUTO: 8.9 % — SIGNIFICANT CHANGE UP (ref 2–14)
NEUTROPHILS # BLD AUTO: 5.44 K/UL — SIGNIFICANT CHANGE UP (ref 1.8–7.4)
NEUTROPHILS NFR BLD AUTO: 69.5 % — SIGNIFICANT CHANGE UP (ref 43–77)
NRBC # BLD: 0 /100 WBCS — SIGNIFICANT CHANGE UP (ref 0–0)
PLATELET # BLD AUTO: 262 K/UL — SIGNIFICANT CHANGE UP (ref 150–400)
POTASSIUM SERPL-MCNC: 3.8 MMOL/L — SIGNIFICANT CHANGE UP (ref 3.5–5.3)
POTASSIUM SERPL-SCNC: 3.8 MMOL/L — SIGNIFICANT CHANGE UP (ref 3.5–5.3)
PROT SERPL-MCNC: 6.3 G/DL — SIGNIFICANT CHANGE UP (ref 6–8.3)
RBC # BLD: 3.34 M/UL — LOW (ref 3.8–5.2)
RBC # FLD: 13.9 % — SIGNIFICANT CHANGE UP (ref 10.3–14.5)
SODIUM SERPL-SCNC: 146 MMOL/L — HIGH (ref 135–145)
WBC # BLD: 7.83 K/UL — SIGNIFICANT CHANGE UP (ref 3.8–10.5)
WBC # FLD AUTO: 7.83 K/UL — SIGNIFICANT CHANGE UP (ref 3.8–10.5)

## 2023-05-04 PROCEDURE — 99222 1ST HOSP IP/OBS MODERATE 55: CPT

## 2023-05-04 PROCEDURE — 70450 CT HEAD/BRAIN W/O DYE: CPT | Mod: 26,MA

## 2023-05-04 PROCEDURE — 72125 CT NECK SPINE W/O DYE: CPT | Mod: 26,MA

## 2023-05-04 PROCEDURE — 74176 CT ABD & PELVIS W/O CONTRAST: CPT | Mod: 26,MA

## 2023-05-04 PROCEDURE — 71250 CT THORAX DX C-: CPT | Mod: 26,MA

## 2023-05-04 PROCEDURE — 71045 X-RAY EXAM CHEST 1 VIEW: CPT | Mod: 26

## 2023-05-04 PROCEDURE — 99285 EMERGENCY DEPT VISIT HI MDM: CPT

## 2023-05-04 PROCEDURE — 72190 X-RAY EXAM OF PELVIS: CPT | Mod: 26

## 2023-05-04 RX ORDER — METOPROLOL TARTRATE 50 MG
1 TABLET ORAL
Refills: 0 | DISCHARGE

## 2023-05-04 RX ORDER — MUPIROCIN 20 MG/G
1 OINTMENT TOPICAL
Qty: 0 | Refills: 0 | DISCHARGE

## 2023-05-04 RX ORDER — LIDOCAINE 4 G/100G
1 CREAM TOPICAL DAILY
Refills: 0 | Status: DISCONTINUED | OUTPATIENT
Start: 2023-05-04 | End: 2023-05-11

## 2023-05-04 RX ORDER — SODIUM CHLORIDE 9 MG/ML
1000 INJECTION INTRAMUSCULAR; INTRAVENOUS; SUBCUTANEOUS
Refills: 0 | Status: COMPLETED | OUTPATIENT
Start: 2023-05-04 | End: 2023-05-04

## 2023-05-04 RX ORDER — ACETAMINOPHEN 500 MG
2 TABLET ORAL
Refills: 0 | DISCHARGE

## 2023-05-04 RX ORDER — ACETAMINOPHEN 500 MG
675 TABLET ORAL ONCE
Refills: 0 | Status: COMPLETED | OUTPATIENT
Start: 2023-05-04 | End: 2023-05-04

## 2023-05-04 RX ADMIN — Medication 270 MILLIGRAM(S): at 21:00

## 2023-05-04 RX ADMIN — SODIUM CHLORIDE 75 MILLILITER(S): 9 INJECTION INTRAMUSCULAR; INTRAVENOUS; SUBCUTANEOUS at 23:17

## 2023-05-04 NOTE — ED PROVIDER NOTE - OBJECTIVE STATEMENT
88 y/o F, PMH of HTN and Dementia (A&Ox1, baseline), presents to ED from [facility] for RUE injury s/p unwitnessed fall 1 week ago. Per staff, pt had unwitnessed fall on Friday.  Pt uses walker at baseline, however since then pt w/ difficulty ambulating and significant bruising noted to RUE on Monday. Outpt XR of Rt shoulder/humerus ordered on Wednesday, which showed comminuted humeral neck fracture. Pt not on AC. Unknown if head injury or LOC. Pt unable to provide any history.

## 2023-05-04 NOTE — H&P ADULT - ASSESSMENT
87 year old female with a PMHx of Alzheimer's disease who presents from [place] memory care unit and outpatient hospice with right proximal humerus fracture and R sup/inf rami and R Sacral ala fracture after an unwitnessed fall.

## 2023-05-04 NOTE — ED PROVIDER NOTE - ATTENDING CONTRIBUTION TO CARE
Patient is a 87-year-old female history of dementia from nursing home had a fall last Friday which is almost 7 days ago noted on Monday to have significant bruising to her right upper extremity x-rays were ordered found to have a proximal humerus fracture patient was sent in for further evaluation with I spoke to the staff at the facility she has some mild chronic hip pain unsure if that is contributing with his unwitnessed fall no head injury no change in mental status ambulates with a walker at baseline here range both hips passively nontender due to the fall and her age CT head and C-spine was ordered bruising to her right chest wall is noted as well CT scan of the chest was also ordered discussed with staff with renal findings think proximal humerus fracture to be managed with sling and nonsurgical management

## 2023-05-04 NOTE — H&P ADULT - PROBLEM SELECTOR PLAN 1
R proximal humerus fracture and R sup/inf rami and R Sacral ala fracture  - seen by orthopedic surgery, appreciate recs  --- NWB of RUE in sling for 2 weeks, afterwhich can remove sling and begin passive ROM of shoulder  --- Can be WBAT for the lower extremity with assistive devices as needed  --- Can have PT begin gentle ROM exercises of elbow at this time  --- Ice and elevate as tolerated  - obtain U/A R proximal humerus fracture and R sup/inf rami and R Sacral ala fracture  - seen by orthopedic surgery, appreciate recs  --- NWB of RUE in sling for 2 weeks, afterwhich can remove sling and begin passive ROM of shoulder  --- Can be WBAT for the lower extremity with assistive devices as needed  --- Can have PT begin gentle ROM exercises of elbow at this time  --- Ice and elevate as tolerated  - pain control with ofirmev 675 mg IV PRN  - lidocaine patch  - obtain U/A

## 2023-05-04 NOTE — ED PROVIDER NOTE - NSCAREINITIATED _GEN_ER
Afia Springer states her and Leroy Hoang have body ache/ chills/ headache/ stomachache  They asked if you can put in an order for [facility]      Call Afia Springer when placed [phone] Alex Lowry(Resident)

## 2023-05-04 NOTE — H&P ADULT - PROBLEM SELECTOR PLAN 2
currently, opens eyes briefly to name  - will keep NPO pending speech pathology evaluation  - takes donepezil and citalopram outpatient; hold for now

## 2023-05-04 NOTE — CONSULT NOTE ADULT - SUBJECTIVE AND OBJECTIVE BOX
Patient is a 87 Female minimal ambulator at nursing facility who presents to [facility] ED due to R proximal humerus fracture. Pt is demented and a poor historian. Per son and nursing home assistant, pt may have had an unwitnessed fall over the weekend. This was suspected after the staff noticed she had a new bruise on the R arm and was not oving it as much. She was sent to [facility] ED for further evaluation, where it found she had a R proximal humerus fracture and R sup/inf rami and R Sacral ala fracture. Had a R GIO, L Hip IMN, and L TKA done many years ago. Son states that these surgeries most likely occurred at [facility]. No other orthopedic concerns at this time.    Alzheimer disease            No Known Allergies      PHYSICAL EXAM:  T(C): 36.7 (05-04-23 @ 19:30), Max: 36.7 (05-04-23 @ 19:30)  HR: 99 (05-04-23 @ 19:30) (99 - 103)  BP: 116/75 (05-04-23 @ 19:30) (104/72 - 116/75)  RR: 16 (05-04-23 @ 19:30) (16 - 18)  SpO2: 95% (05-04-23 @ 19:30) (95% - 97%)    Gen: NAD, Resting comfortably    RLE:  Skin intact, no erythema or ecchymosis  TTP around groin area, nowhere else throughout RLE  No pain with log roll or axial load  +Quad/Ham/EHL/FHL/TA/GSC  +SILT L2-S1  + DP  Compartments soft and compressible  No calf tenderness    RUE:  Skin intact  TTP diffusely around shoulder, no other TTP along RUE  Motor: +Ax/musc/med/uln/rad/AIN/PIN  SILT C5-T1  2+ Rad pulse  Compartments soft and compressible    Secondary Assessment:  NC/AT, NTTP of clavicles, NTTP of C-,T-,L-Spine  LUE: NTTP of Shoulder, Elbow, Wrist, Hand; NT with AROM/PROM of Shoulder, Elbow, Wrist, Hand; AIN/PIN/Med/Uln/Msc/Rad/Ax intact  LLE: Able to SLR, NT with Log Roll, NT with Heel Strike, NTTP of Hip, Knee, Ankle, foot; NT with AROM/PROM of Hip, Knee, Ankle, foot; Q/H/Gsc/TA/EHL/FHL intact    Imaging:  Xray R shld/humerus: Proximal humerus fracture  CT Pelvis: Min displaced R sup/inf pubic rami fractures along with min displaced R Sacral ala fracture    A/P: 87F with R proximal humerus fracture and LC1 pelvic fracture due to unwitnessed fall       Analgesia  NWB of RUE in sling for 2 weeks, afterwhich can remove sling and begin passive ROM of shoulder  Can be WBAT for the lower extremity with assistive devices as needed  Can have PT begin gentle ROM exercises of elbow at this time  Ice and elevate as tolerated  No acute orthopedic surgical intervention indicated at this time  Ortho stable for DC if otherwise medically stable  Daily PT/OT if pt gets admitted  Will discuss with Dr. Barrios and adjust plan as necessary

## 2023-05-04 NOTE — H&P ADULT - NSHPPHYSICALEXAM_GEN_ALL_CORE
Vital Signs Last 24 Hrs  T(C): 36.7 (04 May 2023 19:30), Max: 36.7 (04 May 2023 19:30)  T(F): 98.1 (04 May 2023 19:30), Max: 98.1 (04 May 2023 19:30)  HR: 99 (04 May 2023 19:30) (99 - 103)  BP: 116/75 (04 May 2023 19:30) (104/72 - 116/75)  BP(mean): --  RR: 16 (04 May 2023 19:30) (16 - 18)  SpO2: 95% (04 May 2023 19:30) (95% - 97%)    Parameters below as of 04 May 2023 19:30  Patient On (Oxygen Delivery Method): room air

## 2023-05-04 NOTE — ED PROVIDER NOTE - PROGRESS NOTE DETAILS
Gavin PGY2: Patient endorsed to me at sign out. Seen and assessed at bedside. Patient is comfortable except for pain in R arm. No pelvic instability. Per ortho just sling for arm, WBAT for pelvic fx with pain control. Pt comfortable but unable to stand and any movement of R arm causes pain.     Spoke to Kasie [facility] - pt is hospice and would not want any advanced care. Do not recommend hospitalization. Concern for [facility] ability to care as pt will now likely be bedbound. Will attempt to reach son and [facility] staff. Gavin PGY2: spoke to family - let them know about likely dehydration and need for pain med and re-eval of her functional status. Confirmed ok for admission and DNR/DNI and no feeding tubes.

## 2023-05-04 NOTE — ED PROVIDER NOTE - PHYSICAL EXAMINATION
PHYSICAL EXAM:  GENERAL: non-toxic, chronically ill, and frail appearing; in no respiratory distress  HEENT: Atraumatic, Normocephalic, PERRL, EOMs intact b/l w/out deficits  NECK: No JVD; FROM  CHEST/LUNG: CTAB no wheezes/rhonchi/rales, Rt chest wall ecchymosis, no crepitus  HEART: RRR no murmur/gallops/rubs  ABDOMEN: +BS, soft, NT, ND, no ecchymosis  EXTREMITIES: RUE w/ edema and significant ecchymosis, +2 radial pulses b/l, +2 DP/PT pulses b/l  MUSCULOSKELETAL: no pelvic tenderness  NERVOUS SYSTEM:  A&Ox1, no focal neurologic deficits

## 2023-05-04 NOTE — H&P ADULT - NSHPLABSRESULTS_GEN_ALL_CORE
LABS:                         10.3   7.83  )-----------( 262      ( 04 May 2023 19:10 )             33.2     05-04    146<H>  |  112<H>  |  27<H>  ----------------------------<  155<H>  3.8   |  24  |  0.51    Ca    8.6      04 May 2023 19:10    TPro  6.3  /  Alb  3.1<L>  /  TBili  0.9  /  DBili  x   /  AST  20  /  ALT  41  /  AlkPhos  126<H>  05-04                Records reviewed from prior hospitalization.  Labs reviewed remarkable for - hgb 10.3. CMP largely unremarkable.   EKG personally reviewed   CXR personally reviewed LABS:                         10.3   7.83  )-----------( 262      ( 04 May 2023 19:10 )             33.2     05-04    146<H>  |  112<H>  |  27<H>  ----------------------------<  155<H>  3.8   |  24  |  0.51    Ca    8.6      04 May 2023 19:10    TPro  6.3  /  Alb  3.1<L>  /  TBili  0.9  /  DBili  x   /  AST  20  /  ALT  41  /  AlkPhos  126<H>  05-04                Records reviewed from prior hospitalization.  Labs reviewed remarkable for - hgb 10.3. CMP largely unremarkable.

## 2023-05-04 NOTE — H&P ADULT - MENTAL STATUS
opens eyes to name. follows simple commands such as squeezing hands. does not verbalize when asked questions

## 2023-05-04 NOTE — ED CLERICAL - NS ED CLERK NOTE PRE-ARRIVAL INFORMATION; ADDITIONAL PRE-ARRIVAL INFORMATION
CC/Reason For referral: Lives at [facility] patient, Bruising from Right Shoulder to Right fingertip, [place] denies fall, Imaging shows humeral head and neck fracture  Preferred Consultant(if applicable): Ortho  Who admits for you (if needed): N/A  Do you have documents you would like to fax over? No  Would you still like to speak to an ED attending? Yes, please call Hospice Nurse Degroot [phone] after patient is seen  Called in by Hospice Nurse Degroot

## 2023-05-04 NOTE — H&P ADULT - PROBLEM SELECTOR PLAN 3
- DVT ppx: heparin subq  - GI ppx: none  - Diet:  NPO pending speech pathology evaluation  - IVF: NS at 75 cc/hr for 1 liter  - Code status: DNR and DNI; previously completed MOLST from 11/2021, completed by patient's HCP and son, Yuri Smith, is available in the patient's chart  - HCP: sonYuri  - Dispo: back to outpatient hospice  - prepare for discharge  - no need for repeat bloodwork, unless change in clinical status

## 2023-05-04 NOTE — H&P ADULT - HISTORY OF PRESENT ILLNESS
87 year old female with a PMHx of Alzheimer's disease who presents from [place] memory care unit and outpatient hospice for      ED course: pt found to have a R proximal humerus fracture and R sup/inf rami and R Sacral ala fracture. She was started on NS at 75 cc/hr and given ofirmev. 87 year old female with a PMHx of Alzheimer's disease who presents from [place] memory care unit and outpatient hospice for right arm fracture. History from the patient herself is limited d/t underlying dementia. Per chart review, the patient had an unwitnessed fall the weekend of presentation. Staffed noticed a new bruise in the RUE. Outpatient X rays revealed fractures.    ED course: pt found to have a R proximal humerus fracture and R sup/inf rami and R Sacral ala fracture. She was started on NS at 75 cc/hr and given ofirmev.

## 2023-05-05 ENCOUNTER — TRANSCRIPTION ENCOUNTER (OUTPATIENT)
Age: 88
End: 2023-05-05

## 2023-05-05 LAB
APPEARANCE UR: ABNORMAL
BACTERIA # UR AUTO: ABNORMAL
BILIRUB UR-MCNC: NEGATIVE — SIGNIFICANT CHANGE UP
COLOR SPEC: ABNORMAL
DIFF PNL FLD: NEGATIVE — SIGNIFICANT CHANGE UP
EPI CELLS # UR: 0 /HPF — SIGNIFICANT CHANGE UP
GLUCOSE UR QL: NEGATIVE — SIGNIFICANT CHANGE UP
KETONES UR-MCNC: NEGATIVE — SIGNIFICANT CHANGE UP
LEUKOCYTE ESTERASE UR-ACNC: ABNORMAL
NITRITE UR-MCNC: NEGATIVE — SIGNIFICANT CHANGE UP
PH UR: 7 — SIGNIFICANT CHANGE UP (ref 5–8)
PROT UR-MCNC: ABNORMAL
RBC CASTS # UR COMP ASSIST: 40 /HPF — HIGH (ref 0–4)
SP GR SPEC: 1.03 — HIGH (ref 1.01–1.02)
UROBILINOGEN FLD QL: ABNORMAL
WBC UR QL: 31 /HPF — HIGH (ref 0–5)

## 2023-05-05 RX ORDER — HEPARIN SODIUM 5000 [USP'U]/ML
5000 INJECTION INTRAVENOUS; SUBCUTANEOUS EVERY 12 HOURS
Refills: 0 | Status: DISCONTINUED | OUTPATIENT
Start: 2023-05-05 | End: 2023-05-11

## 2023-05-05 RX ORDER — LIDOCAINE 4 G/100G
1 CREAM TOPICAL
Qty: 30 | Refills: 0
Start: 2023-05-05 | End: 2023-06-03

## 2023-05-05 RX ORDER — METOPROLOL TARTRATE 50 MG
25 TABLET ORAL
Refills: 0 | Status: DISCONTINUED | OUTPATIENT
Start: 2023-05-05 | End: 2023-05-11

## 2023-05-05 RX ADMIN — Medication 25 MILLIGRAM(S): at 18:55

## 2023-05-05 RX ADMIN — LIDOCAINE 1 PATCH: 4 CREAM TOPICAL at 14:30

## 2023-05-05 RX ADMIN — LIDOCAINE 1 PATCH: 4 CREAM TOPICAL at 02:22

## 2023-05-05 NOTE — PATIENT PROFILE ADULT - NSPROHMSYMPCOND_GEN_A_NUR
"Pt arrives via EMS as transfer from [facility] for evaluation of ruptured liver mass. Pt reports epigastric pain 8/10 which began today when he attempted to have BM. Pt states "I got so sweaty and thought I was going to pass out but I didn't". Pt denies N/V/D, chest pain, SOB. Per EMS, pt received 4 zofran and 8 morphine at 2030.   " cardiovascular

## 2023-05-05 NOTE — SWALLOW BEDSIDE ASSESSMENT ADULT - SWALLOW EVAL: DIAGNOSIS
86 y/o F with PMH of Alzheimer's disease who presents from [place] memory care unit and outpatient hospice with right proximal humerus fracture and R sup/inf rami and R Sacral ala fracture after an unwitnessed fall. Pt was seen today for Bedside Swallow Evaluation which revealed an oral dysphagia and suspected pharyngeal dysphagia. The swallow sequence was characterized by mildly prolonged though generally efficient mastication of solids with mildly reduced AP bolus transfer, audible swallow with less viscous liquids suggestive of reduced coordination, suspected premature spillage of thin liquids with overt clinical s/s of aspiration or penetration (coughing post intake of thin liquids and throat clearing post intake of mildly thick liquids). No overt clinical s/s of aspiration or penetration across all trials of moderately thick liquids, puree, or solids.

## 2023-05-05 NOTE — SWALLOW BEDSIDE ASSESSMENT ADULT - PHARYNGEAL PHASE
Coughing post oral intake Audible swallow suggestive of reduced coordination; Throat clear post oral intake with STRAW sips Within functional limits

## 2023-05-05 NOTE — DISCHARGE NOTE PROVIDER - NSDCFUADDINST_GEN_ALL_CORE_FT
NWB of RUE in sling for 2 weeks, afterwhich can remove sling and begin passive ROM of shoulder  Can be WBAT for the lower extremity with assistive devices as needed.

## 2023-05-05 NOTE — PHYSICAL THERAPY INITIAL EVALUATION ADULT - PERTINENT HX OF CURRENT PROBLEM, REHAB EVAL
Pt is a 86 y/o female admitted to [facility] on 5/4/23 PMHx of Alzheimer's disease who presents from [place] memory care unit and outpatient hospice for right arm fracture. History from the patient herself is limited d/t underlying dementia. Per chart review, the patient had an unwitnessed fall the weekend of presentation. Staffed noticed a new bruise in the RUE. Outpatient X rays revealed fractures. CT Pelvis:  Right superior and inferior pubic rami fractures. Nondisplaced right sacral alar fracture.  Progressed L3 and new L4 upper endplate fractures when compared to 11/18/2021.  CT Chest: Acute comminuted impacted fracture of the proximal right humerus. Large hiatal hernia with organoaxial volvulus of the stomach. Severe vertebral compression deformities of T11, L1 and L5 are new since 11/18/2021.  XR R shoulder: Acute impacted and comminuted right proximal humerus fracture.

## 2023-05-05 NOTE — PHYSICAL THERAPY INITIAL EVALUATION ADULT - ADDITIONAL COMMENTS
Unable to obtain social history, as per chart, pt is from an [facility] and dependent for all ADLs.

## 2023-05-05 NOTE — SWALLOW BEDSIDE ASSESSMENT ADULT - COMMENTS
IMAGING:  CT CHEST: 5/4/23  IMPRESSION:  *  Acute comminuted impacted fracture of the proximal right humerus.  *  Large hiatal hernia with organoaxial volvulus of the stomach. No evidence of bowel ischemia.  *  Severe vertebral compression deformities of T11, L1 and L5 are new since 11/18/2021 (please note the patient has transitional vertebral anatomy with the final lumbarized vertebral body referred to as L6).  *  Acute fractures of the right superior and inferior pubic rami as well as the right sacral ala. Please see dedicated report for CT bony pelvis for further evaluation.    Pt is unknown to this service.

## 2023-05-05 NOTE — PATIENT PROFILE ADULT - FALL HARM RISK - HARM RISK INTERVENTIONS

## 2023-05-05 NOTE — DISCHARGE NOTE PROVIDER - DETAILS OF MALNUTRITION DIAGNOSIS/DIAGNOSES
This patient has been assessed with a concern for Malnutrition and was treated during this hospitalization for the following Nutrition diagnosis/diagnoses:     -  05/10/2023: Underweight (BMI < 19)

## 2023-05-05 NOTE — DISCHARGE NOTE PROVIDER - CARE PROVIDER_API CALL
Dalton Monaco)  Family Medicine  [address] [place] [address], [address]  [place] [address]  Phone: [phone]  Fax: [phone]  Follow Up Time: 1 week   Dalton Monaco)  Family Medicine  [address] [facility] [address], [address]  [place] [address]  Phone: [phone]  Fax: [phone]  Follow Up Time: 1 week    Theo Aguilar)  Orthopedics  [address] [place] [id number]  Phone: [phone]  Fax: [phone]  Follow Up Time: 2 weeks

## 2023-05-05 NOTE — SWALLOW BEDSIDE ASSESSMENT ADULT - SWALLOW EVAL: RECOMMENDED FEEDING/EATING TECHNIQUES
meds in puree; slow rate/alternate food with liquid/oral hygiene/position upright (90 degrees)/small sips/bites

## 2023-05-05 NOTE — SWALLOW BEDSIDE ASSESSMENT ADULT - ORAL PHASE
Suspected premature spillage Mildly decreased anterior-posterior movement of the bolus Mild oral stasis (clears with liquid wash)/Decreased anterior-posterior movement of the bolus

## 2023-05-05 NOTE — SWALLOW BEDSIDE ASSESSMENT ADULT - MODE OF PRESENTATION
spoon/straw/self fed/fed by clinician cup/spoon/straw/self fed/fed by clinician with hand over hand assistance/cup/self fed self fed

## 2023-05-05 NOTE — DISCHARGE NOTE PROVIDER - HOSPITAL COURSE
87 year old female with a PMHx of Alzheimer's disease who presents from [place] memory care unit and outpatient hospice with right proximal humerus fracture and R sup/inf rami and R Sacral ala fracture after an unwitnessed fall.       Problem/Plan - 1:  ·  Problem: Humeral fracture.   ·  Plan: R proximal humerus fracture and R sup/inf rami and R Sacral ala fracture  - seen by orthopedic surgery, appreciate recs  --- NWB of RUE in sling for 2 weeks, afterwhich can remove sling and begin passive ROM of shoulder  --- Can be WBAT for the lower extremity with assistive devices as needed  --- Can have PT begin gentle ROM exercises of elbow at this time  --- Ice and elevate as tolerated  - pain control with ofirmev 675 mg IV PRN  - lidocaine patch  - obtain U/A.     Problem/Plan - 2:  ·  Problem: Dementia.   ·  Plan: currently, opens eyes briefly to name  - will keep NPO pending speech pathology evaluation  - takes donepezil and citalopram outpatient; hold for now.     Problem/Plan - 3:  ·  Problem: Prophylactic measure.   ·  Plan: - DVT ppx: heparin subq  - GI ppx: none  - Diet:  NPO pending speech pathology evaluation  - IVF: NS at 75 cc/hr for 1 liter  - Code status: DNR and DNI; previously completed MOLST from 11/2021, completed by patient's HCP and son, Yuri Smith, is available in the patient's chart  - HCP: son, Yuri Smith  - Dispo: back to outpatient hospice  - prepare for discharge   87 year old female with a PMHx of Alzheimer's disease who presents from [place] memory care unit and outpatient hospice with right proximal humerus fracture and R sup/inf rami and R Sacral ala fracture after an unwitnessed fall.    Humeral fracture.   ·  Plan: R proximal humerus fracture and R sup/inf rami and R Sacral ala fracture  - seen by orthopedic surgery, appreciate recs  --- NWB of RUE in sling for 2 weeks, afterwhich can remove sling and begin passive ROM of shoulder  --- Can be WBAT for the lower extremity with assistive devices as needed  --- Can have PT begin gentle ROM exercises of elbow at this time  --- Ice and elevate as tolerated  - pain control with OFIRMEV 675 mg IV PRN  - lidocaine patch      Dementia.   ·  Plan: currently, opens eyes briefly to name  - S&S eval, easy to chew with moderately thick liquids  - takes donepezil and citalopram outpatient; hold for now.     Problem/Plan - 3:  ·  Problem: Prophylactic measure.   ·  Plan: - DVT ppx: heparin subq  - GI ppx: none  - IVF: NS at 75 cc/hr for 1 liter  - Code status: DNR and DNI; previously completed MOLST from 11/2021, completed by patient's HCP and son, Yuri Smith, is available in the patient's chart  - HCP: sonYuri  - Dispo: back to outpatient hospice     87 year old female with a PMHx of Alzheimer's disease who presents from [place] memory care unit and outpatient hospice with right proximal humerus fracture and R sup/inf rami and R Sacral ala fracture after an unwitnessed fall.    Humeral fracture.   ·  Plan: R proximal humerus fracture and R sup/inf rami and R Sacral ala fracture  - seen by orthopedic surgery, appreciate recs  --- NWB of RUE in sling for 2 weeks, afterwhich can remove sling and begin passive ROM of shoulder  --- Can be WBAT for the lower extremity with assistive devices as needed  --- Can have PT begin gentle ROM exercises of elbow at this time  --- Ice and elevate as tolerated  lidocaine patch      Dementia.   ·  Plan: currently, opens eyes briefly to name  - S&S eval, easy to chew with moderately thick liquids  - takes donepezil and citalopram outpatient; hold for now.    - Code status: DNR and DNI; previously completed MOLST from 11/2021, completed by patient's HCP and son, Yuri Smith, is available in the patient's chart  - HCP: sonYuri  - Dispo: back to outpatient hospice     87 year old female with a PMHx of Alzheimer's disease who presents from [place] memory care unit and outpatient hospice with right proximal humerus fracture and R sup/inf rami and R Sacral ala fracture after an unwitnessed fall.    Humeral fracture.   ·  Plan: R proximal humerus fracture and R sup/inf rami and R Sacral ala fracture  - seen by orthopedic surgery, appreciate recs  --- NWB of RUE in sling for 2 weeks, after which can remove sling and begin passive ROM of shoulder  --- Can be WBAT for the lower extremity with assistive devices as needed  --- Can have PT begin gentle ROM exercises of elbow at this time  --- Ice and elevate as tolerated  lidocaine patch    Dementia.   ·  Plan: currently, opens eyes briefly to name  - S&S eval, easy to chew with moderately thick liquids  - takes donepezil and citalopram outpatient; hold for now.    UTI  Plan: Your UA+  -Started empiric Rocephin on 5/6   -Afebrile.     - Code status: DNR and DNI; previously completed MOLST from 11/2021, completed by patient's HCP and son, Yuri Smith, is available in the patient's chart  - HCP: sonYuri  - Dispo: back to outpatient hospice     87 year old female with a PMHx of Alzheimer's disease who presents from [place] memory care unit and outpatient hospice with right proximal humerus fracture and R sup/inf rami and R Sacral ala fracture after an unwitnessed fall.    Humeral fracture.   ·  Plan: R proximal humerus fracture and R sup/inf rami and R Sacral ala fracture  - seen by orthopedic surgery, appreciate recs  --- NWB of RUE in sling for 2 weeks, after which can remove sling and begin passive ROM of shoulder  --- Can be WBAT for the lower extremity with assistive devices as needed  --- Can have PT begin gentle ROM exercises of elbow at this time  --- Ice and elevate as tolerated  lidocaine patch    Dementia.   ·  Plan: currently, opens eyes briefly to name  - S&S eval, easy to chew with moderately thick liquids  - takes donepezil and citalopram outpatient; hold for now.    UTI  Plan: Your UA+  -Started empiric Rocephin on 5/6, finished 5/11  -Afebrile.     - Code status: DNR and DNI; previously completed MOLST from 11/2021, completed by patient's HCP and son, Yuri Smith, is available in the patient's chart  - HCP: son, Yuri Smith  - Dispo: back to outpatient hospice     87 year old female with a PMHx of Alzheimer's disease who presents from [place] memory care unit and outpatient hospice with right proximal humerus fracture and R sup/inf rami and R Sacral ala fracture after an unwitnessed fall.    Humeral fracture.   ·  Plan: R proximal humerus fracture and R sup/inf rami and R Sacral ala fracture  - seen by orthopedic surgery, appreciate recs  --- NWB of RUE in sling for 2 weeks, after which can remove sling and begin passive ROM of shoulder  --- Can be WBAT for the lower extremity with assistive devices as needed  --- Can have PT begin gentle ROM exercises of elbow at this time  --- Ice and elevate as tolerated  lidocaine patch    Dementia.   ·  Plan: currently, opens eyes briefly to name  - S&S eval, easy to chew with moderately thick liquids  - takes donepezil and citalopram outpatient; hold for now.    UTI  Plan: Your UA+  -Started empiric Rocephin on 5/6, finished 5/11  -Afebrile.     Diarrhea  Plan: Stool studies sent - NGTD  - Resolved     - Code status: DNR and DNI; previously completed MOLST from 11/2021, completed by patient's HCP and son, Yuri Smith, is available in the patient's chart  - HCP: sonYuri  - Dispo: back to outpatient hospice

## 2023-05-05 NOTE — DISCHARGE NOTE PROVIDER - PROVIDER TOKENS
PROVIDER:[TOKEN:[[id number]],FOLLOWUP:[1 week]] PROVIDER:[TOKEN:[[id number]],FOLLOWUP:[1 week]],PROVIDER:[TOKEN:[[id number]],FOLLOWUP:[2 weeks]]

## 2023-05-05 NOTE — SWALLOW BEDSIDE ASSESSMENT ADULT - SLP PERTINENT HISTORY OF CURRENT PROBLEM
86 y/o F with PMH of Alzheimer's disease who presents from [place] memory care unit and outpatient hospice for right arm fracture. Pt had an unwitnessed fall the weekend of presentation. Outpatient X rays revealed fractures and pt sent to [facility] ED. Ortho consulted. Imaging revealed a R proximal humerus fracture and LC1 pelvic fracture due to unwitnessed fall. NWB of RUE in sling for 2 weeks. No acute orthopedic surgical intervention indicated at this time. Stable for DC if otherwise medically stable. 88 y/o F with PMH of Alzheimer's disease who presents from [place] memory care unit and outpatient hospice for right arm fracture. Pt had an unwitnessed fall the weekend of presentation. Outpatient X rays revealed fractures and pt sent to [facility] ED. Ortho consulted. Imaging revealed a R proximal humerus fracture and LC1 pelvic fracture due to unwitnessed fall. NWB of RUE in sling for 2 weeks. No acute orthopedic surgical intervention indicated at this time. Currently, opens eyes briefly to name. Will keep NPO pending speech pathology evaluation. DNR/DNI. Prepare for d/c back to outpatient hospice.

## 2023-05-05 NOTE — DISCHARGE NOTE PROVIDER - NSDCHHHOMEBOUNDOTHER_GEN_ALL_CORE_FT
s/p right humerus fracture NWB of RUE in sling for 2 weeks, afterwhich can remove sling and begin passive ROM of shoulder   pelvic fracture , Can be WBAT for the lower extremity with assistive devices as needed  Can have PT begin gentle ROM exercises of elbow at this time

## 2023-05-05 NOTE — SWALLOW BEDSIDE ASSESSMENT ADULT - ADDITIONAL RECOMMENDATIONS
GOALS:  1. Pt's family/caregiver will demonstrate understanding and carryover of dysphagia management (safe swallow guidelines, dysphagia diet).  2. Pt will tolerate recommended diet with no overt, clinical s/s of aspiration.

## 2023-05-05 NOTE — DISCHARGE NOTE PROVIDER - NSDCMRMEDTOKEN_GEN_ALL_CORE_FT
acetaminophen 325 mg oral tablet: 2 tab(s) orally 2 times a day  ascorbic acid 500 mg oral tablet: 1 tab(s) orally once a day  citalopram 10 mg oral tablet: 1 tab(s) orally once a day  cyanocobalamin 1000 mcg oral tablet: 1 tab(s) orally once a day  donepezil 10 mg oral tablet: 1 tab(s) orally once a day (at bedtime)  metoprolol tartrate 25 mg oral tablet: 1 tab(s) orally 2 times a day  Multiple Vitamins oral tablet: 1 tab(s) orally once a day   acetaminophen 325 mg oral tablet: 2 tab(s) orally 2 times a day  ascorbic acid 500 mg oral tablet: 1 tab(s) orally once a day  citalopram 10 mg oral tablet: 1 tab(s) orally once a day  cyanocobalamin 1000 mcg oral tablet: 1 tab(s) orally once a day  donepezil 10 mg oral tablet: 1 tab(s) orally once a day (at bedtime)  lidocaine 4% topical film: Apply topically to affected area once a day .  metoprolol tartrate 25 mg oral tablet: 1 tab(s) orally 2 times a day  Multiple Vitamins oral tablet: 1 tab(s) orally once a day

## 2023-05-05 NOTE — DISCHARGE NOTE PROVIDER - NSDCCPCAREPLAN_GEN_ALL_CORE_FT
PRINCIPAL DISCHARGE DIAGNOSIS  Diagnosis: Closed pelvic fracture  Assessment and Plan of Treatment: pain management; Can be WBAT for the lower extremity with assistive devices as needed  Can have PT begin gentle ROM exercises of elbow at this time  Ice and elevate as tolerated  No acute orthopedic surgical intervention indicated at this time      SECONDARY DISCHARGE DIAGNOSES  Diagnosis: Humeral fracture  Assessment and Plan of Treatment: NWB of RUE in sling for 2 weeks, afterwhich can remove sling and begin passive ROM of shoulder  Can be WBAT for the lower extremity with assistive devices as needed  Can have PT begin gentle ROM exercises of elbow at this time  Ice and elevate as tolerated; if patient copmplains of pain can consider tylenol .       PRINCIPAL DISCHARGE DIAGNOSIS  Diagnosis: Closed pelvic fracture  Assessment and Plan of Treatment: pain management; Can be WBAT for the lower extremity with assistive devices as needed  Can have PT begin gentle ROM exercises of elbow at this time  Ice and elevate as tolerated  No acute orthopedic surgical intervention indicated at this time      SECONDARY DISCHARGE DIAGNOSES  Diagnosis: Humeral fracture  Assessment and Plan of Treatment: NWB of RUE in sling for 2 weeks, afterwhich can remove sling and begin passive ROM of shoulder  Can be WBAT for the lower extremity with assistive devices as needed  Can have PT begin gentle ROM exercises of elbow at this time  Ice and elevate as tolerated; if patient copmplains of pain can consider tylenol .      Diagnosis: Acute UTI  Assessment and Plan of Treatment: HOME CARE INSTRUCTIONS  f you were prescribed antibiotics, take them exactly as your caregiver instructs you. Finish the medication even if you feel better after you have only taken some of the medication.  Drink enough water and fluids to keep your urine clear or pale yellow.  Avoid caffeine, tea, and carbonated beverages. They tend to irritate your bladder.  Empty your bladder often. Avoid holding urine for long periods of time.  Empty your bladder before and after sexual intercourse.  After a bowel movement, women should cleanse from front to back. Use each tissue only once.  SEEK MEDICAL CARE IF:  You have back pain.  You develop a fever.  Your symptoms do not begin to resolve within 3 days.  SEEK IMMEDIATE MEDICAL CARE IF:  You have severe back pain or lower abdominal pain.  You develop chills.  You have nausea or vomiting.  You have continued burning or discomfort with urination.       PRINCIPAL DISCHARGE DIAGNOSIS  Diagnosis: Closed pelvic fracture  Assessment and Plan of Treatment: Pain management; Can be WBAT for the lower extremity with assistive devices as needed  Can have PT begin gentle ROM exercises of elbow at this time  Ice and elevate as tolerated  No acute orthopedic surgical intervention indicated at this time      SECONDARY DISCHARGE DIAGNOSES  Diagnosis: Humeral fracture  Assessment and Plan of Treatment: NWB of RUE in sling for 2 weeks, afterwhich can remove sling and begin passive ROM of shoulder  Can be WBAT for the lower extremity with assistive devices as needed  Can have PT begin gentle ROM exercises of elbow at this time  Ice and elevate as tolerated; if patient copmplains of pain can consider tylenol .      Diagnosis: Acute UTI  Assessment and Plan of Treatment: HOME CARE INSTRUCTIONS  If you were prescribed antibiotics, take them exactly as your caregiver instructs you. Finish the medication even if you feel better after you have only taken some of the medication.  Drink enough water and fluids to keep your urine clear or pale yellow.  Avoid caffeine, tea, and carbonated beverages. They tend to irritate your bladder.  Empty your bladder often. Avoid holding urine for long periods of time.  Empty your bladder before and after sexual intercourse.  After a bowel movement, women should cleanse from front to back. Use each tissue only once.  SEEK MEDICAL CARE IF:  You have back pain.  You develop a fever.  Your symptoms do not begin to resolve within 3 days.  SEEK IMMEDIATE MEDICAL CARE IF:  You have severe back pain or lower abdominal pain.  You develop chills.  You have nausea or vomiting.  You have continued burning or discomfort with urination.

## 2023-05-05 NOTE — PHYSICAL THERAPY INITIAL EVALUATION ADULT - BED MOBILITY LIMITATIONS, REHAB EVAL
87 decreased ability to use arms for pushing/pulling/decreased ability to use legs for bridging/pushing

## 2023-05-05 NOTE — SWALLOW BEDSIDE ASSESSMENT ADULT - SLP GENERAL OBSERVATIONS
Pt was received at bedside awake. +room air. She was AAOx1 to first name only (Alzheimer's Dementia dx). Minimal verbal output. Only able to follow one command.

## 2023-05-06 LAB
ANION GAP SERPL CALC-SCNC: 10 MMOL/L — SIGNIFICANT CHANGE UP (ref 5–17)
BUN SERPL-MCNC: 18 MG/DL — SIGNIFICANT CHANGE UP (ref 7–23)
CALCIUM SERPL-MCNC: 8.9 MG/DL — SIGNIFICANT CHANGE UP (ref 8.4–10.5)
CHLORIDE SERPL-SCNC: 110 MMOL/L — HIGH (ref 96–108)
CO2 SERPL-SCNC: 25 MMOL/L — SIGNIFICANT CHANGE UP (ref 22–31)
CREAT SERPL-MCNC: 0.49 MG/DL — LOW (ref 0.5–1.3)
EGFR: 92 ML/MIN/1.73M2 — SIGNIFICANT CHANGE UP
GLUCOSE SERPL-MCNC: 103 MG/DL — HIGH (ref 70–99)
HCT VFR BLD CALC: 32.1 % — LOW (ref 34.5–45)
HGB BLD-MCNC: 10 G/DL — LOW (ref 11.5–15.5)
MCHC RBC-ENTMCNC: 30.7 PG — SIGNIFICANT CHANGE UP (ref 27–34)
MCHC RBC-ENTMCNC: 31.2 GM/DL — LOW (ref 32–36)
MCV RBC AUTO: 98.5 FL — SIGNIFICANT CHANGE UP (ref 80–100)
NRBC # BLD: 0 /100 WBCS — SIGNIFICANT CHANGE UP (ref 0–0)
PLATELET # BLD AUTO: 267 K/UL — SIGNIFICANT CHANGE UP (ref 150–400)
POTASSIUM SERPL-MCNC: 4.1 MMOL/L — SIGNIFICANT CHANGE UP (ref 3.5–5.3)
POTASSIUM SERPL-SCNC: 4.1 MMOL/L — SIGNIFICANT CHANGE UP (ref 3.5–5.3)
RBC # BLD: 3.26 M/UL — LOW (ref 3.8–5.2)
RBC # FLD: 14.1 % — SIGNIFICANT CHANGE UP (ref 10.3–14.5)
SARS-COV-2 RNA SPEC QL NAA+PROBE: SIGNIFICANT CHANGE UP
SODIUM SERPL-SCNC: 145 MMOL/L — SIGNIFICANT CHANGE UP (ref 135–145)
WBC # BLD: 8.39 K/UL — SIGNIFICANT CHANGE UP (ref 3.8–10.5)
WBC # FLD AUTO: 8.39 K/UL — SIGNIFICANT CHANGE UP (ref 3.8–10.5)

## 2023-05-06 PROCEDURE — 93010 ELECTROCARDIOGRAM REPORT: CPT

## 2023-05-06 RX ORDER — DONEPEZIL HYDROCHLORIDE 10 MG/1
10 TABLET, FILM COATED ORAL AT BEDTIME
Refills: 0 | Status: DISCONTINUED | OUTPATIENT
Start: 2023-05-06 | End: 2023-05-11

## 2023-05-06 RX ORDER — CITALOPRAM 10 MG/1
10 TABLET, FILM COATED ORAL DAILY
Refills: 0 | Status: DISCONTINUED | OUTPATIENT
Start: 2023-05-06 | End: 2023-05-11

## 2023-05-06 RX ORDER — ACETAMINOPHEN 500 MG
675 TABLET ORAL ONCE
Refills: 0 | Status: COMPLETED | OUTPATIENT
Start: 2023-05-06 | End: 2023-05-06

## 2023-05-06 RX ORDER — ACETAMINOPHEN 500 MG
650 TABLET ORAL EVERY 6 HOURS
Refills: 0 | Status: DISCONTINUED | OUTPATIENT
Start: 2023-05-06 | End: 2023-05-11

## 2023-05-06 RX ORDER — CEFTRIAXONE 500 MG/1
1000 INJECTION, POWDER, FOR SOLUTION INTRAMUSCULAR; INTRAVENOUS EVERY 24 HOURS
Refills: 0 | Status: DISCONTINUED | OUTPATIENT
Start: 2023-05-06 | End: 2023-05-11

## 2023-05-06 RX ADMIN — HEPARIN SODIUM 5000 UNIT(S): 5000 INJECTION INTRAVENOUS; SUBCUTANEOUS at 06:24

## 2023-05-06 RX ADMIN — LIDOCAINE 1 PATCH: 4 CREAM TOPICAL at 20:45

## 2023-05-06 RX ADMIN — CITALOPRAM 10 MILLIGRAM(S): 10 TABLET, FILM COATED ORAL at 11:06

## 2023-05-06 RX ADMIN — CEFTRIAXONE 100 MILLIGRAM(S): 500 INJECTION, POWDER, FOR SOLUTION INTRAMUSCULAR; INTRAVENOUS at 18:14

## 2023-05-06 RX ADMIN — LIDOCAINE 1 PATCH: 4 CREAM TOPICAL at 11:08

## 2023-05-06 RX ADMIN — Medication 25 MILLIGRAM(S): at 06:25

## 2023-05-06 RX ADMIN — HEPARIN SODIUM 5000 UNIT(S): 5000 INJECTION INTRAVENOUS; SUBCUTANEOUS at 17:05

## 2023-05-06 RX ADMIN — Medication 270 MILLIGRAM(S): at 20:00

## 2023-05-06 RX ADMIN — LIDOCAINE 1 PATCH: 4 CREAM TOPICAL at 23:14

## 2023-05-06 RX ADMIN — DONEPEZIL HYDROCHLORIDE 10 MILLIGRAM(S): 10 TABLET, FILM COATED ORAL at 22:22

## 2023-05-06 RX ADMIN — Medication 25 MILLIGRAM(S): at 17:05

## 2023-05-06 RX ADMIN — Medication 675 MILLIGRAM(S): at 20:01

## 2023-05-06 RX ADMIN — LIDOCAINE 1 PATCH: 4 CREAM TOPICAL at 00:34

## 2023-05-06 RX ADMIN — LIDOCAINE 1 PATCH: 4 CREAM TOPICAL at 01:38

## 2023-05-07 LAB
ALBUMIN SERPL ELPH-MCNC: 3.3 G/DL — SIGNIFICANT CHANGE UP (ref 3.3–5)
ALP SERPL-CCNC: 121 U/L — HIGH (ref 40–120)
ALT FLD-CCNC: 23 U/L — SIGNIFICANT CHANGE UP (ref 10–45)
ANION GAP SERPL CALC-SCNC: 13 MMOL/L — SIGNIFICANT CHANGE UP (ref 5–17)
AST SERPL-CCNC: 10 U/L — SIGNIFICANT CHANGE UP (ref 10–40)
BILIRUB SERPL-MCNC: 1 MG/DL — SIGNIFICANT CHANGE UP (ref 0.2–1.2)
BUN SERPL-MCNC: 23 MG/DL — SIGNIFICANT CHANGE UP (ref 7–23)
CALCIUM SERPL-MCNC: 9 MG/DL — SIGNIFICANT CHANGE UP (ref 8.4–10.5)
CHLORIDE SERPL-SCNC: 106 MMOL/L — SIGNIFICANT CHANGE UP (ref 96–108)
CO2 SERPL-SCNC: 22 MMOL/L — SIGNIFICANT CHANGE UP (ref 22–31)
CREAT SERPL-MCNC: 0.5 MG/DL — SIGNIFICANT CHANGE UP (ref 0.5–1.3)
EGFR: 92 ML/MIN/1.73M2 — SIGNIFICANT CHANGE UP
GLUCOSE SERPL-MCNC: 109 MG/DL — HIGH (ref 70–99)
HCT VFR BLD CALC: 33.5 % — LOW (ref 34.5–45)
HGB BLD-MCNC: 10.6 G/DL — LOW (ref 11.5–15.5)
MCHC RBC-ENTMCNC: 30.6 PG — SIGNIFICANT CHANGE UP (ref 27–34)
MCHC RBC-ENTMCNC: 31.6 GM/DL — LOW (ref 32–36)
MCV RBC AUTO: 96.8 FL — SIGNIFICANT CHANGE UP (ref 80–100)
NRBC # BLD: 0 /100 WBCS — SIGNIFICANT CHANGE UP (ref 0–0)
PLATELET # BLD AUTO: 279 K/UL — SIGNIFICANT CHANGE UP (ref 150–400)
POTASSIUM SERPL-MCNC: 3.5 MMOL/L — SIGNIFICANT CHANGE UP (ref 3.5–5.3)
POTASSIUM SERPL-SCNC: 3.5 MMOL/L — SIGNIFICANT CHANGE UP (ref 3.5–5.3)
PROT SERPL-MCNC: 6.4 G/DL — SIGNIFICANT CHANGE UP (ref 6–8.3)
RAPID RVP RESULT: SIGNIFICANT CHANGE UP
RBC # BLD: 3.46 M/UL — LOW (ref 3.8–5.2)
RBC # FLD: 14.6 % — HIGH (ref 10.3–14.5)
SARS-COV-2 RNA SPEC QL NAA+PROBE: SIGNIFICANT CHANGE UP
SODIUM SERPL-SCNC: 141 MMOL/L — SIGNIFICANT CHANGE UP (ref 135–145)
WBC # BLD: 8.81 K/UL — SIGNIFICANT CHANGE UP (ref 3.8–10.5)
WBC # FLD AUTO: 8.81 K/UL — SIGNIFICANT CHANGE UP (ref 3.8–10.5)

## 2023-05-07 PROCEDURE — 71045 X-RAY EXAM CHEST 1 VIEW: CPT | Mod: 26

## 2023-05-07 RX ORDER — ACETAMINOPHEN 500 MG
675 TABLET ORAL ONCE
Refills: 0 | Status: DISCONTINUED | OUTPATIENT
Start: 2023-05-07 | End: 2023-05-11

## 2023-05-07 RX ADMIN — CEFTRIAXONE 100 MILLIGRAM(S): 500 INJECTION, POWDER, FOR SOLUTION INTRAMUSCULAR; INTRAVENOUS at 17:38

## 2023-05-07 RX ADMIN — DONEPEZIL HYDROCHLORIDE 10 MILLIGRAM(S): 10 TABLET, FILM COATED ORAL at 21:37

## 2023-05-07 RX ADMIN — Medication 25 MILLIGRAM(S): at 05:47

## 2023-05-07 RX ADMIN — Medication 25 MILLIGRAM(S): at 17:44

## 2023-05-07 RX ADMIN — HEPARIN SODIUM 5000 UNIT(S): 5000 INJECTION INTRAVENOUS; SUBCUTANEOUS at 05:46

## 2023-05-07 RX ADMIN — HEPARIN SODIUM 5000 UNIT(S): 5000 INJECTION INTRAVENOUS; SUBCUTANEOUS at 17:43

## 2023-05-07 RX ADMIN — LIDOCAINE 1 PATCH: 4 CREAM TOPICAL at 11:15

## 2023-05-07 RX ADMIN — LIDOCAINE 1 PATCH: 4 CREAM TOPICAL at 23:06

## 2023-05-07 RX ADMIN — CITALOPRAM 10 MILLIGRAM(S): 10 TABLET, FILM COATED ORAL at 11:15

## 2023-05-07 NOTE — CHART NOTE - NSCHARTNOTEFT_GEN_A_CORE
S: frebile illness  HPI Patient admitted s/p fall and sustained a superior and inferior pubic rami fracture and RUE fracture  Patient has a sling in right arm and seen by ortho and requried no surgical intervention.  Patient seen lying in with monosyllabic words. ROS limitied  PE : awake as stated above  Heent : acceptable   Cv: s1 s2 RRR  lungs : good air entry  GI : soft and non tender  : incontinent  Ext right hip eccymotic  < from: CT Chest No Cont (05.04.23 @ 18:43) >      ACC: [id number] EXAM:  CT ABDOMEN AND PELVIS   ORDERED BY: SAMIRA GARCIA     ACC: [id number] EXAM:  CT CHEST   ORDERED BY: SAMIRA GARCIA     PROCEDURE DATE:  05/04/2023          INTERPRETATION:  CLINICAL INFORMATION: Unwitnessed fall 6-7 days prior  with bruising to the right upper extremity and right chest wall    COMPARISON: CTA chest 7/17/2011, CT abdomen and pelvis 11/18/2021    CONTRAST/COMPLICATIONS:  IV Contrast: NONE  Oral Contrast: NONE  Complications: None reported at time of study completion    PROCEDURE:  CT of the Chest, Abdomen and Pelvis was performed.  Sagittal and coronal reformats were performed.    FINDINGS:  CHEST:  LUNGS AND LARGE AIRWAYS: No endobronchial lesion. Partial left lower lobe   atelectasis.  PLEURA: No pleural effusion or pneumothorax.  VESSELS: Coronary artery calcifications.  HEART: Heart size is normal. No pericardial effusion.  MEDIASTINUM AND WILLIAM: No lymphadenopathy.  CHEST WALL AND LOWER NECK: Within normal limits.    ABDOMEN AND PELVIS:  LIVER: Within normal limits.  BILE DUCTS: Normal caliber.  GALLBLADDER: Cholelithiasis.  SPLEEN: Within normal limits.  PANCREAS: Within normal limits.  ADRENALS: Within normal limits.  KIDNEYS/URETERS: No hydronephrosis. Bilateral renal cysts.    BLADDER: Within normal limits.  REPRODUCTIVE ORGANS: Fibroid uterus.    BOWEL: Large left diaphragmatic hernia containing the stomach, loops of   small and large bowel and the pancreatic tail/body. O    < end of copied text >      Vital Signs Last 24 Hrs  T(C): 38.7 (07 May 2023 17:09), Max: 39.2 (06 May 2023 18:00)  T(F): 101.6 (07 May 2023 17:09), Max: 102.6 (06 May 2023 18:00)  HR: 118 (07 May 2023 16:17) (83 - 118)  BP: 127/85 (07 May 2023 16:17) (110/75 - 129/79)  BP(mean): --  RR: 18 (07 May 2023 16:17) (18 - 18)  SpO2: 94% (07 May 2023 16:17) (92% - 96%)    Parameters below as of 07 May 2023 16:17  Patient On (Oxygen Delivery Method): room air    LABS ; blood cultures pending including UA  Urine cultures ``u`rinalysis + Microscopic Examination (05.05.23 @ 18:15)   pH Urine: 7.0  Urine Appearance: Turbid  Color: Dark Yellow  Specific Gravity: 1.028  Protein, Urine: 30 mg/dL  Glucose Qualitative, Urine: Negative  Ketone - Urine: Negative  Blood, Urine: Negative  Bilirubin: Negative  Urobilinogen: 8 mg/dL  Leukocyte Esterase Concentration: Large  Nitrite: Negative  White Blood Cell - Urine: 31 /HPF  Red Blood Cell - Urine: 40 /hpf  Bacteria: Many  Squamous Epithelial Cells: 0 /hpf    a/p 87 yrs old female s/p fall and sustained pubic rami fracture and rt humerus fracture now with fever x2 days  c/w Rocephin  check cxr  check rvp  f/up blood cxs  d/w dr jimenez
PAtient admitted following a fall developed a rt Humerus fracture and pelvic fracture, no surigical intervention as per orthopedics, Rt upper arm in sling  This evening RN reported a fever 102 and .  LAbs reveiwed and noted a positive urinalysis  PE: awake but nonverbal   HEENT: acceptable   Cv: S1S2 tacycardia  Lungs : poor inpsiratory effort  GI : soft and non tender  : incontinent of urine   Ext : no leg edema , rt arm in sling  Urinalysis + Microscopic Examination (05.05.23 @ 18:15)   pH Urine: 7.0  Urine Appearance: Turbid  Color: Dark Yellow  Specific Gravity: 1.028  Protein, Urine: 30 mg/dL  Glucose Qualitative, Urine: Negative  Ketone - Urine: Negative  Blood, Urine: Negative  Bilirubin: Negative  Urobilinogen: 8 mg/dL  Leukocyte Esterase Concentration: Large  Nitrite: Negative  White Blood Cell - Urine: 31 /HPF  Red Blood Cell - Urine: 40 /hpf  Bacteria: Many  Squamous Epithelial Cells: 0 /hpf                          10.0   8.39  )-----------( 267      ( 06 May 2023 06:31 )             32.1   Vital Signs Last 24 Hrs  T(C): 36.6 (06 May 2023 16:00), Max: 38.2 (06 May 2023 12:02)  T(F): 97.8 (06 May 2023 16:00), Max: 100.7 (06 May 2023 12:02)  HR: 108 (06 May 2023 16:00) (86 - 108)  BP: 134/88 (06 May 2023 16:00) (121/80 - 147/83)  BP(mean): --  RR: 18 (06 May 2023 16:00) (18 - 18)  SpO2: 94% (06 May 2023 16:00) (94% - 95%)    Parameters below as of 06 May 2023 16:00  Patient On (Oxygen Delivery Method): room air    A/P 87 yrs olde female pw s/p fall and resulted in humerus fracture and pelvic fracture now with fever  Pan culture and then start rocephin   follow up urine cxs  blood pressure stable , hold off on iv fluids but will consider if unstable   d/w dr jimenez

## 2023-05-08 PROCEDURE — 93010 ELECTROCARDIOGRAM REPORT: CPT

## 2023-05-08 RX ADMIN — DONEPEZIL HYDROCHLORIDE 10 MILLIGRAM(S): 10 TABLET, FILM COATED ORAL at 21:42

## 2023-05-08 RX ADMIN — LIDOCAINE 1 PATCH: 4 CREAM TOPICAL at 23:00

## 2023-05-08 RX ADMIN — LIDOCAINE 1 PATCH: 4 CREAM TOPICAL at 11:27

## 2023-05-08 RX ADMIN — Medication 25 MILLIGRAM(S): at 17:46

## 2023-05-08 RX ADMIN — HEPARIN SODIUM 5000 UNIT(S): 5000 INJECTION INTRAVENOUS; SUBCUTANEOUS at 05:14

## 2023-05-08 RX ADMIN — HEPARIN SODIUM 5000 UNIT(S): 5000 INJECTION INTRAVENOUS; SUBCUTANEOUS at 17:47

## 2023-05-08 RX ADMIN — CEFTRIAXONE 100 MILLIGRAM(S): 500 INJECTION, POWDER, FOR SOLUTION INTRAMUSCULAR; INTRAVENOUS at 18:06

## 2023-05-08 RX ADMIN — CITALOPRAM 10 MILLIGRAM(S): 10 TABLET, FILM COATED ORAL at 11:27

## 2023-05-08 RX ADMIN — Medication 25 MILLIGRAM(S): at 05:14

## 2023-05-08 NOTE — ADVANCED PRACTICE NURSE CONSULT - RECOMMEDATIONS
Will recommend:  1. Topical therapy- sacral/bilateral buttocks- cleanse w/incontinent cleanser, pat dry & apply Barbara moisture barrier ointment twice daily & prn soiling episodes  2. Incontinent management - incontinent cleanser, pads, pericare BID, continue with external female catheter  3. Maintain on an alternating air with low air loss surface  4. Turn & reposition every 2 hr; Use positioning pillow to turn and reposition, soft pillow between bony prominences; continue measures to decrease friction/shear/pressure  5. Elevate heels; continue with Complete Cair air fluidized boot to feet; ensure that the soles of the feet are not resting on the foot board of the bed  6. Nutrition optimization.  7. Apply "Sween 24 once a day moisturizer" daily to B/L LE.  Discussed treatment plan w/staff RNMyke.

## 2023-05-08 NOTE — ADVANCED PRACTICE NURSE CONSULT - REASON FOR CONSULT
Requested by staff to assess skin status of patient admitted with skin breakdown to the sacrum.  PMH is noted as obtained by chart review:  87 year old female with a PMHx of Alzheimer's disease who presents from [facility] memory care unit and outpatient hospice for right arm fracture. History from the patient herself is limited d/t underlying dementia. Per chart review, the patient had an unwitnessed fall the weekend of presentation. Staffed noticed a new bruise in the RUE. Outpatient X rays revealed fractures.    ED course: pt found to have a R proximal humerus fracture and R sup/inf rami and R Sacral ala fracture. She was started on NS at 75 cc/hr and given ofirmev.

## 2023-05-08 NOTE — ADVANCED PRACTICE NURSE CONSULT - ASSESSMENT
Consult received & events noted to date. The pt was encountered on 7T-MsZoila Smith is awake and alert but does not seem to respond meaningfully to any stimulus, non-conversant and with blank stare (pt’s baseline per staff). Introduced self & role of CWOCN to pt. Pt requires assistance w/turning & repositioning & PCA at bedside to assist. Pt is on an alternating air with low air loss support surface. Per staff, she is incontinent of urine and an external female catheter in place. Pt is also incontinent of stool (no BM today). Per chart review and staff, pt is s/p fall and with ecchymosis to R arm/elbow, R chest/flank & R hip. On exam noted to sacrum/bilateral buttocks is a 8cm L x 7cm W x 0cm D area of intact, dusky maroon discoloration with a central area of darker purple intact discoloration to R buttocks suggesting that there may be a component of deep tissue injury. Findings suggest that this skin alteration appears to be a deep tissue injury present on admission. Will recommend to continue to monitor & apply Barbara moisture barrier cream twice daily & prn soiling episodes.  As pt was admitted with a pressure injury, a dietician consult is pending. Consult received & events noted to date. The pt was encountered on 7T-MsZoila Smith is awake and alert but does not seem to respond meaningfully to any stimulus, non-conversant and with blank stare (pt’s baseline per staff). Introduced self & role of CWOCN to pt. Pt requires assistance w/turning & repositioning & PCA at bedside to assist. Pt is on an alternating air with low air loss support surface. Per staff, she is incontinent of urine and an external female catheter in place. Pt is also incontinent of stool (no BM today). Per chart review and staff, pt is s/p fall and with ecchymosis to R arm/elbow, R chest/flank & R hip. On exam noted to sacrum/bilateral buttocks is a 8cm L x 7cm W x 0cm D area of intact, dusky maroon discoloration with a central area of darker purple intact discoloration to R buttocks suggesting that there may be a component of deep tissue injury. Pt's extreme immobility, inactivity, gross incontinence of stool as well as poor nutritional status all contribute to his high risk for pressure injury development and hinder healing. Identification of the initial signs of deep tissue damage at the level of the skin within 72 hours of admission make this an injury that occurred prior to admission.  Will recommend to continue to monitor & apply Barbara moisture barrier cream twice daily & prn soiling episodes.  As pt was admitted with a pressure injury, a dietician consult is pending.

## 2023-05-09 LAB
ANION GAP SERPL CALC-SCNC: 12 MMOL/L — SIGNIFICANT CHANGE UP (ref 5–17)
BUN SERPL-MCNC: 23 MG/DL — SIGNIFICANT CHANGE UP (ref 7–23)
CALCIUM SERPL-MCNC: 8.8 MG/DL — SIGNIFICANT CHANGE UP (ref 8.4–10.5)
CHLORIDE SERPL-SCNC: 111 MMOL/L — HIGH (ref 96–108)
CO2 SERPL-SCNC: 22 MMOL/L — SIGNIFICANT CHANGE UP (ref 22–31)
CREAT SERPL-MCNC: 0.47 MG/DL — LOW (ref 0.5–1.3)
EGFR: 93 ML/MIN/1.73M2 — SIGNIFICANT CHANGE UP
GLUCOSE SERPL-MCNC: 125 MG/DL — HIGH (ref 70–99)
HCT VFR BLD CALC: 33.2 % — LOW (ref 34.5–45)
HGB BLD-MCNC: 10.5 G/DL — LOW (ref 11.5–15.5)
MCHC RBC-ENTMCNC: 30.3 PG — SIGNIFICANT CHANGE UP (ref 27–34)
MCHC RBC-ENTMCNC: 31.6 GM/DL — LOW (ref 32–36)
MCV RBC AUTO: 95.7 FL — SIGNIFICANT CHANGE UP (ref 80–100)
NRBC # BLD: 0 /100 WBCS — SIGNIFICANT CHANGE UP (ref 0–0)
PLATELET # BLD AUTO: 283 K/UL — SIGNIFICANT CHANGE UP (ref 150–400)
POTASSIUM SERPL-MCNC: 3.6 MMOL/L — SIGNIFICANT CHANGE UP (ref 3.5–5.3)
POTASSIUM SERPL-SCNC: 3.6 MMOL/L — SIGNIFICANT CHANGE UP (ref 3.5–5.3)
RBC # BLD: 3.47 M/UL — LOW (ref 3.8–5.2)
RBC # FLD: 15.2 % — HIGH (ref 10.3–14.5)
SODIUM SERPL-SCNC: 145 MMOL/L — SIGNIFICANT CHANGE UP (ref 135–145)
WBC # BLD: 7.61 K/UL — SIGNIFICANT CHANGE UP (ref 3.8–10.5)
WBC # FLD AUTO: 7.61 K/UL — SIGNIFICANT CHANGE UP (ref 3.8–10.5)

## 2023-05-09 RX ADMIN — LIDOCAINE 1 PATCH: 4 CREAM TOPICAL at 23:00

## 2023-05-09 RX ADMIN — Medication 25 MILLIGRAM(S): at 05:34

## 2023-05-09 RX ADMIN — LIDOCAINE 1 PATCH: 4 CREAM TOPICAL at 11:42

## 2023-05-09 RX ADMIN — CITALOPRAM 10 MILLIGRAM(S): 10 TABLET, FILM COATED ORAL at 11:42

## 2023-05-09 RX ADMIN — HEPARIN SODIUM 5000 UNIT(S): 5000 INJECTION INTRAVENOUS; SUBCUTANEOUS at 05:34

## 2023-05-09 RX ADMIN — DONEPEZIL HYDROCHLORIDE 10 MILLIGRAM(S): 10 TABLET, FILM COATED ORAL at 21:13

## 2023-05-09 RX ADMIN — HEPARIN SODIUM 5000 UNIT(S): 5000 INJECTION INTRAVENOUS; SUBCUTANEOUS at 17:06

## 2023-05-09 RX ADMIN — CEFTRIAXONE 100 MILLIGRAM(S): 500 INJECTION, POWDER, FOR SOLUTION INTRAMUSCULAR; INTRAVENOUS at 17:06

## 2023-05-09 RX ADMIN — Medication 25 MILLIGRAM(S): at 17:07

## 2023-05-10 LAB — GI PCR PANEL: SIGNIFICANT CHANGE UP

## 2023-05-10 RX ADMIN — DONEPEZIL HYDROCHLORIDE 10 MILLIGRAM(S): 10 TABLET, FILM COATED ORAL at 21:26

## 2023-05-10 RX ADMIN — LIDOCAINE 1 PATCH: 4 CREAM TOPICAL at 22:52

## 2023-05-10 RX ADMIN — HEPARIN SODIUM 5000 UNIT(S): 5000 INJECTION INTRAVENOUS; SUBCUTANEOUS at 05:06

## 2023-05-10 RX ADMIN — Medication 25 MILLIGRAM(S): at 17:11

## 2023-05-10 RX ADMIN — LIDOCAINE 1 PATCH: 4 CREAM TOPICAL at 20:55

## 2023-05-10 RX ADMIN — HEPARIN SODIUM 5000 UNIT(S): 5000 INJECTION INTRAVENOUS; SUBCUTANEOUS at 17:11

## 2023-05-10 RX ADMIN — LIDOCAINE 1 PATCH: 4 CREAM TOPICAL at 11:23

## 2023-05-10 RX ADMIN — CITALOPRAM 10 MILLIGRAM(S): 10 TABLET, FILM COATED ORAL at 11:22

## 2023-05-10 RX ADMIN — Medication 25 MILLIGRAM(S): at 05:06

## 2023-05-10 RX ADMIN — CEFTRIAXONE 100 MILLIGRAM(S): 500 INJECTION, POWDER, FOR SOLUTION INTRAMUSCULAR; INTRAVENOUS at 17:09

## 2023-05-10 NOTE — DIETITIAN INITIAL EVALUATION ADULT - PERTINENT MEDS FT
MEDICATIONS  (STANDING):  acetaminophen   IVPB .. 675 milliGRAM(s) IV Intermittent once  cefTRIAXone   IVPB 1000 milliGRAM(s) IV Intermittent every 24 hours  citalopram 10 milliGRAM(s) Oral daily  donepezil 10 milliGRAM(s) Oral at bedtime  heparin   Injectable 5000 Unit(s) SubCutaneous every 12 hours  lidocaine   4% Patch 1 Patch Transdermal daily  metoprolol tartrate 25 milliGRAM(s) Oral two times a day    MEDICATIONS  (PRN):  acetaminophen   Oral Liquid .. 650 milliGRAM(s) Oral every 6 hours PRN Temp greater or equal to 38C (100.4F), Mild Pain (1 - 3)

## 2023-05-10 NOTE — DIETITIAN INITIAL EVALUATION ADULT - REASON INDICATOR FOR ASSESSMENT
Stage 2 Pressure Injury or greater  Information obtained from: Review of pt's current medical record. Phone call with pt's son (Yuri; [phone]). Pt with Alzheimer disease, unable to participate in RD interview/assessment

## 2023-05-10 NOTE — DIETITIAN INITIAL EVALUATION ADULT - ORAL INTAKE PTA/DIET HISTORY
As per son:  Confirms no known food allergies/intolerances. Reports having a good appetite and PO intakes PTA; does require to be totally fed during meal times. Did not follow any specific dietary restrictions. Pt denies nausea, vomiting, diarrhea, or constipation. Denies difficulty chewing/swallowing. Denies any vitamin/mineral use at home, pt reports not taking oral nutritional supplement at home as well.

## 2023-05-10 NOTE — DIETITIAN INITIAL EVALUATION ADULT - NSFNSNUTRCHEWSWALLOWFT_GEN_A_CORE
-- Pt was assessed by SLP on 5/5/23, with recommendations for "Easy to Chew Diet and Moderately Thick Liquids."

## 2023-05-10 NOTE — DIETITIAN INITIAL EVALUATION ADULT - PROBLEM SELECTOR PLAN 1
R proximal humerus fracture and R sup/inf rami and R Sacral ala fracture  - seen by orthopedic surgery, appreciate recs  --- NWB of RUE in sling for 2 weeks, afterwhich can remove sling and begin passive ROM of shoulder  --- Can be WBAT for the lower extremity with assistive devices as needed  --- Can have PT begin gentle ROM exercises of elbow at this time  --- Ice and elevate as tolerated  - pain control with ofirmev 675 mg IV PRN  - lidocaine patch  - obtain U/A

## 2023-05-10 NOTE — DIETITIAN INITIAL EVALUATION ADULT - ADD RECOMMEND
1. Continue no therapeutic dietary restrictions. Defer diet/texture advancement to medical team/SLP as indicated  2. Consider adding multivitamin and vitamin C supplements if no medical contraindications to aid in wound healing.   3. RD will add Magic cup 2x/day and Mighty Shakes 2x/day to help provide additional calorie/protein to optimize PO intakes.  4. Encourage PO intakes. Nursing Staff to provide assistance during meal times as warranted.   5. Monitor PO intake, GI tolerance, skin integrity and labs. RD remains available if needed.  6. BMI sticker placed in chart.

## 2023-05-10 NOTE — DIETITIAN INITIAL EVALUATION ADULT - REASON FOR ADMISSION
Chart Reviewed, Events Noted  "87 year old female with a PMHx of Alzheimer's disease who presents from [facility] care unit and outpatient hospice for right arm fracture. History from the patient herself is limited d/t underlying dementia. Per chart review, the patient had an unwitnessed fall the weekend of presentation. Staffed noticed a new bruise in the RUE. Outpatient X rays revealed fractures."

## 2023-05-10 NOTE — DIETITIAN INITIAL EVALUATION ADULT - WEIGHT (KG)
Called facility back left appointment details on voicemail. Requested a call back to get patient added to schedule. Kechi call back information provided.   45.4

## 2023-05-10 NOTE — DIETITIAN INITIAL EVALUATION ADULT - PERTINENT LABORATORY DATA
05-09    145  |  111<H>  |  23  ----------------------------<  125<H>  3.6   |  22  |  0.47<L>    Ca    8.8      09 May 2023 05:13

## 2023-05-10 NOTE — DIETITIAN INITIAL EVALUATION ADULT - REASON
Pt with Alzheimer disease, family not present at bedside to provide consent for Nutrition Focused Physical Exam.

## 2023-05-11 ENCOUNTER — TRANSCRIPTION ENCOUNTER (OUTPATIENT)
Age: 88
End: 2023-05-11

## 2023-05-11 VITALS
TEMPERATURE: 97 F | HEART RATE: 103 BPM | OXYGEN SATURATION: 94 % | DIASTOLIC BLOOD PRESSURE: 68 MMHG | RESPIRATION RATE: 18 BRPM | SYSTOLIC BLOOD PRESSURE: 111 MMHG

## 2023-05-11 LAB
ANION GAP SERPL CALC-SCNC: 13 MMOL/L — SIGNIFICANT CHANGE UP (ref 5–17)
BUN SERPL-MCNC: 19 MG/DL — SIGNIFICANT CHANGE UP (ref 7–23)
CALCIUM SERPL-MCNC: 8.9 MG/DL — SIGNIFICANT CHANGE UP (ref 8.4–10.5)
CHLORIDE SERPL-SCNC: 109 MMOL/L — HIGH (ref 96–108)
CO2 SERPL-SCNC: 23 MMOL/L — SIGNIFICANT CHANGE UP (ref 22–31)
CREAT SERPL-MCNC: 0.42 MG/DL — LOW (ref 0.5–1.3)
CULTURE RESULTS: SIGNIFICANT CHANGE UP
EGFR: 96 ML/MIN/1.73M2 — SIGNIFICANT CHANGE UP
GLUCOSE SERPL-MCNC: 103 MG/DL — HIGH (ref 70–99)
HCT VFR BLD CALC: 33.9 % — LOW (ref 34.5–45)
HGB BLD-MCNC: 10.6 G/DL — LOW (ref 11.5–15.5)
MCHC RBC-ENTMCNC: 30.5 PG — SIGNIFICANT CHANGE UP (ref 27–34)
MCHC RBC-ENTMCNC: 31.3 GM/DL — LOW (ref 32–36)
MCV RBC AUTO: 97.4 FL — SIGNIFICANT CHANGE UP (ref 80–100)
NRBC # BLD: 0 /100 WBCS — SIGNIFICANT CHANGE UP (ref 0–0)
PLATELET # BLD AUTO: 274 K/UL — SIGNIFICANT CHANGE UP (ref 150–400)
POTASSIUM SERPL-MCNC: 4.1 MMOL/L — SIGNIFICANT CHANGE UP (ref 3.5–5.3)
POTASSIUM SERPL-SCNC: 4.1 MMOL/L — SIGNIFICANT CHANGE UP (ref 3.5–5.3)
RBC # BLD: 3.48 M/UL — LOW (ref 3.8–5.2)
RBC # FLD: 15.1 % — HIGH (ref 10.3–14.5)
SODIUM SERPL-SCNC: 145 MMOL/L — SIGNIFICANT CHANGE UP (ref 135–145)
SPECIMEN SOURCE: SIGNIFICANT CHANGE UP
WBC # BLD: 6.19 K/UL — SIGNIFICANT CHANGE UP (ref 3.8–10.5)
WBC # FLD AUTO: 6.19 K/UL — SIGNIFICANT CHANGE UP (ref 3.8–10.5)

## 2023-05-11 PROCEDURE — 73090 X-RAY EXAM OF FOREARM: CPT

## 2023-05-11 PROCEDURE — 87046 STOOL CULTR AEROBIC BACT EA: CPT

## 2023-05-11 PROCEDURE — 85027 COMPLETE CBC AUTOMATED: CPT

## 2023-05-11 PROCEDURE — 97110 THERAPEUTIC EXERCISES: CPT

## 2023-05-11 PROCEDURE — 87077 CULTURE AEROBIC IDENTIFY: CPT

## 2023-05-11 PROCEDURE — 72170 X-RAY EXAM OF PELVIS: CPT

## 2023-05-11 PROCEDURE — 87045 FECES CULTURE AEROBIC BACT: CPT

## 2023-05-11 PROCEDURE — 73060 X-RAY EXAM OF HUMERUS: CPT

## 2023-05-11 PROCEDURE — 97161 PT EVAL LOW COMPLEX 20 MIN: CPT

## 2023-05-11 PROCEDURE — 71250 CT THORAX DX C-: CPT | Mod: MA

## 2023-05-11 PROCEDURE — 36415 COLL VENOUS BLD VENIPUNCTURE: CPT

## 2023-05-11 PROCEDURE — 72190 X-RAY EXAM OF PELVIS: CPT

## 2023-05-11 PROCEDURE — 87040 BLOOD CULTURE FOR BACTERIA: CPT

## 2023-05-11 PROCEDURE — 71045 X-RAY EXAM CHEST 1 VIEW: CPT

## 2023-05-11 PROCEDURE — 70450 CT HEAD/BRAIN W/O DYE: CPT | Mod: MA

## 2023-05-11 PROCEDURE — 76377 3D RENDER W/INTRP POSTPROCES: CPT

## 2023-05-11 PROCEDURE — 96374 THER/PROPH/DIAG INJ IV PUSH: CPT

## 2023-05-11 PROCEDURE — 99285 EMERGENCY DEPT VISIT HI MDM: CPT | Mod: 25

## 2023-05-11 PROCEDURE — 80048 BASIC METABOLIC PNL TOTAL CA: CPT

## 2023-05-11 PROCEDURE — 81001 URINALYSIS AUTO W/SCOPE: CPT

## 2023-05-11 PROCEDURE — 74176 CT ABD & PELVIS W/O CONTRAST: CPT | Mod: MA

## 2023-05-11 PROCEDURE — 72125 CT NECK SPINE W/O DYE: CPT | Mod: MA

## 2023-05-11 PROCEDURE — 73502 X-RAY EXAM HIP UNI 2-3 VIEWS: CPT

## 2023-05-11 PROCEDURE — 85025 COMPLETE CBC W/AUTO DIFF WBC: CPT

## 2023-05-11 PROCEDURE — 97530 THERAPEUTIC ACTIVITIES: CPT

## 2023-05-11 PROCEDURE — 73552 X-RAY EXAM OF FEMUR 2/>: CPT

## 2023-05-11 PROCEDURE — 73030 X-RAY EXAM OF SHOULDER: CPT

## 2023-05-11 PROCEDURE — 92610 EVALUATE SWALLOWING FUNCTION: CPT

## 2023-05-11 PROCEDURE — 93005 ELECTROCARDIOGRAM TRACING: CPT

## 2023-05-11 PROCEDURE — 80053 COMPREHEN METABOLIC PANEL: CPT

## 2023-05-11 PROCEDURE — U0005: CPT

## 2023-05-11 PROCEDURE — U0003: CPT

## 2023-05-11 PROCEDURE — 87507 IADNA-DNA/RNA PROBE TQ 12-25: CPT

## 2023-05-11 PROCEDURE — 0225U NFCT DS DNA&RNA 21 SARSCOV2: CPT

## 2023-05-11 PROCEDURE — 73080 X-RAY EXAM OF ELBOW: CPT

## 2023-05-11 RX ADMIN — HEPARIN SODIUM 5000 UNIT(S): 5000 INJECTION INTRAVENOUS; SUBCUTANEOUS at 05:13

## 2023-05-11 RX ADMIN — Medication 25 MILLIGRAM(S): at 05:13

## 2023-05-11 RX ADMIN — LIDOCAINE 1 PATCH: 4 CREAM TOPICAL at 12:16

## 2023-05-11 RX ADMIN — CITALOPRAM 10 MILLIGRAM(S): 10 TABLET, FILM COATED ORAL at 12:17

## 2023-05-11 NOTE — PROGRESS NOTE ADULT - NUTRITIONAL ASSESSMENT
This patient has been assessed with a concern for Malnutrition and has been determined to have a diagnosis/diagnoses of Underweight (BMI < 19).    This patient is being managed with:   Diet Easy to Chew-  Moderately Thick Liquids (MODTHICKLIQS)  Entered: May  5 2023 12:36PM  
This patient has been assessed with a concern for Malnutrition and has been determined to have a diagnosis/diagnoses of Underweight (BMI < 19).    This patient is being managed with:   Diet Easy to Chew-  Moderately Thick Liquids (MODTHICKLIQS)  Entered: May  5 2023 12:36PM

## 2023-05-11 NOTE — DISCHARGE NOTE NURSING/CASE MANAGEMENT/SOCIAL WORK - NSDCPEFALRISK_GEN_ALL_CORE
For information on Fall & Injury Prevention, visit: [email or web address] OR  [email or web address] OR  https://www.cdc.gov/steadi/patient.html

## 2023-05-11 NOTE — PROGRESS NOTE ADULT - ASSESSMENT
87 year old female with a PMHx of Alzheimer's disease who presents from [place] memory care unit and outpatient hospice with right proximal humerus fracture and R sup/inf rami and R Sacral ala fracture after an unwitnessed fall.      Humeral fracture.   - R proximal humerus fracture and R sup/inf rami and R Sacral ala fracture  - seen by orthopedic surgery, appreciate recs  --- NWB of RUE in sling for 2 weeks, afterwhich can remove sling and begin passive ROM of shoulder  --- Can be WBAT for the lower extremity with assistive devices as needed  --- Can have PT begin gentle ROM exercises of elbow at this time  --- Ice and elevate as tolerated  - pain control with ofirmev 675 mg IV PRN  - lidocaine patch  - obtain U/A.     Dementia.    currently, opens eyes briefly to name  - will keep NPO pending speech pathology evaluation  - takes donepezil and citalopram outpatient; hold for now.     Prophylactic measure.    - DVT ppx: heparin subq  - GI ppx: none  - Diet:  NPO pending speech pathology evaluation  - IVF: NS at 75 cc/hr for 1 liter  - Code status: DNR and DNI; previously completed MOLST from 11/2021, completed by patient's HCP and son, Yuri Smith, is available in the patient's chart  - HCP: sonYuri  - Dispo: back to outpatient hospice  - prepare for discharge  - no need for repeat bloodwork, unless change in clinical status.  
87 year old female with a PMHx of Alzheimer's disease who presents from [place] memory care unit and outpatient hospice with right proximal humerus fracture and R sup/inf rami and R Sacral ala fracture after an unwitnessed fall.    fever, sepsis , UTI  - c/w abx  - follow up cultures ngtd    Humeral fracture.   - R proximal humerus fracture and R sup/inf rami and R Sacral ala fracture  - seen by orthopedic surgery, appreciate recs  --- NWB of RUE in sling for 2 weeks, afterwhich can remove sling and begin passive ROM of shoulder  --- Can be WBAT for the lower extremity with assistive devices as needed  --- Can have PT begin gentle ROM exercises of elbow at this time  --- Ice and elevate as tolerated  - pain control with ofirmev 675 mg IV PRN  - lidocaine patch     Dementia.    currently, opens eyes briefly to name  - easy to chew diet  - takes donepezil and citalopram outpatient; will cont     Prophylactic measure.    - DVT ppx: heparin subq  - GI ppx: none  - Diet:  soft to chew diet  - Code status: DNR and DNI; previously completed MOLST from 11/2021, completed by patient's HCP and son, Yuri Smith, is available in the patient's chart  - HCP: sonYuri  - Dispo: back to outpatient hospice  - Return to [facility] with assist for all functional mobility  - prepare for discharge  
87 year old female with a PMHx of Alzheimer's disease who presents from [place] memory care unit and outpatient hospice with right proximal humerus fracture and R sup/inf rami and R Sacral ala fracture after an unwitnessed fall.    fever, sepsis , UTI  - c/w abx  - follow up cultures    Humeral fracture.   - R proximal humerus fracture and R sup/inf rami and R Sacral ala fracture  - seen by orthopedic surgery, appreciate recs  --- NWB of RUE in sling for 2 weeks, afterwhich can remove sling and begin passive ROM of shoulder  --- Can be WBAT for the lower extremity with assistive devices as needed  --- Can have PT begin gentle ROM exercises of elbow at this time  --- Ice and elevate as tolerated  - pain control with ofirmev 675 mg IV PRN  - lidocaine patch  - obtain U/A.     Dementia.    currently, opens eyes briefly to name  - easy to chew diet  - takes donepezil and citalopram outpatient; will cont     Prophylactic measure.    - DVT ppx: heparin subq  - GI ppx: none  - Diet:  NPO pending speech pathology evaluation  - IVF: NS at 75 cc/hr for 1 liter  - Code status: DNR and DNI; previously completed MOLST from 11/2021, completed by patient's HCP and son, Yuri Smith, is available in the patient's chart  - HCP: sonYuri  - Dispo: back to outpatient hospice  - Return to [facility] with assist for all functional mobility  - prepare for discharge  - no need for repeat bloodwork, unless change in clinical status.  
87 year old female with a PMHx of Alzheimer's disease who presents from [place] memory care unit and outpatient hospice with right proximal humerus fracture and R sup/inf rami and R Sacral ala fracture after an unwitnessed fall.      Humeral fracture.   - R proximal humerus fracture and R sup/inf rami and R Sacral ala fracture  - seen by orthopedic surgery, appreciate recs  --- NWB of RUE in sling for 2 weeks, afterwhich can remove sling and begin passive ROM of shoulder  --- Can be WBAT for the lower extremity with assistive devices as needed  --- Can have PT begin gentle ROM exercises of elbow at this time  --- Ice and elevate as tolerated  - pain control with ofirmev 675 mg IV PRN  - lidocaine patch  - obtain U/A.     Dementia.    currently, opens eyes briefly to name  - easy to chew diet  - takes donepezil and citalopram outpatient; will cont     Prophylactic measure.    - DVT ppx: heparin subq  - GI ppx: none  - Diet:  NPO pending speech pathology evaluation  - IVF: NS at 75 cc/hr for 1 liter  - Code status: DNR and DNI; previously completed MOLST from 11/2021, completed by patient's HCP and son, Yuri Smith, is available in the patient's chart  - HCP: sonYuri  - Dispo: back to outpatient hospice  - Return to [facility] with assist for all functional mobility  - prepare for discharge  - no need for repeat bloodwork, unless change in clinical status.  
87 year old female with a PMHx of Alzheimer's disease who presents from [place] memory care unit and outpatient hospice with right proximal humerus fracture and R sup/inf rami and R Sacral ala fracture after an unwitnessed fall.    fever, sepsis , UTI  - c/w abx  - follow up cultures ngtd    diarrhea  - PI PCR negative    Humeral fracture.   - R proximal humerus fracture and R sup/inf rami and R Sacral ala fracture  - seen by orthopedic surgery, appreciate recs  --- NWB of RUE in sling for 2 weeks, afterwhich can remove sling and begin passive ROM of shoulder  --- Can be WBAT for the lower extremity with assistive devices as needed  --- Can have PT begin gentle ROM exercises of elbow at this time  --- Ice and elevate as tolerated  - pain control with ofirmev 675 mg IV PRN  - lidocaine patch     Dementia.    currently, opens eyes briefly to name  - easy to chew diet  - takes donepezil and citalopram outpatient; will cont     Prophylactic measure.    - DVT ppx: heparin subq  - GI ppx: none  - Diet:  soft to chew diet  - Code status: DNR and DNI; previously completed MOLST from 11/2021, completed by patient's HCP and son, Yuri Smith, is available in the patient's chart  - HCP: sonYuri  - Dispo: back to outpatient hospice  - Return to [facility] with assist for all functional mobility  - prepare for discharge  
87 year old female with a PMHx of Alzheimer's disease who presents from [place] memory care unit and outpatient hospice with right proximal humerus fracture and R sup/inf rami and R Sacral ala fracture after an unwitnessed fall.    fever, sepsis , UTI  - c/w abx  - follow up cultures ngtd    Humeral fracture.   - R proximal humerus fracture and R sup/inf rami and R Sacral ala fracture  - seen by orthopedic surgery, appreciate recs  --- NWB of RUE in sling for 2 weeks, afterwhich can remove sling and begin passive ROM of shoulder  --- Can be WBAT for the lower extremity with assistive devices as needed  --- Can have PT begin gentle ROM exercises of elbow at this time  --- Ice and elevate as tolerated  - pain control with ofirmev 675 mg IV PRN  - lidocaine patch     Dementia.    currently, opens eyes briefly to name  - easy to chew diet  - takes donepezil and citalopram outpatient; will cont     Prophylactic measure.    - DVT ppx: heparin subq  - GI ppx: none  - Diet:  soft to chew diet  - Code status: DNR and DNI; previously completed MOLST from 11/2021, completed by patient's HCP and son, Yuri Smith, is available in the patient's chart  - HCP: sonYuri  - Dispo: back to outpatient hospice  - Return to [facility] with assist for all functional mobility  - prepare for discharge  
87 year old female with a PMHx of Alzheimer's disease who presents from [place] memory care unit and outpatient hospice with right proximal humerus fracture and R sup/inf rami and R Sacral ala fracture after an unwitnessed fall.    fever, sepsis , UTI  - c/w abx  - follow up cultures ngtd    diarrhea  - PI PCR negative    Humeral fracture.   - R proximal humerus fracture and R sup/inf rami and R Sacral ala fracture  - seen by orthopedic surgery, appreciate recs  --- NWB of RUE in sling for 2 weeks, afterwhich can remove sling and begin passive ROM of shoulder  --- Can be WBAT for the lower extremity with assistive devices as needed  --- Can have PT begin gentle ROM exercises of elbow at this time  --- Ice and elevate as tolerated  - pain control with ofirmev 675 mg IV PRN  - lidocaine patch     Dementia.    currently, opens eyes briefly to name  - easy to chew diet  - takes donepezil and citalopram outpatient; will cont     Prophylactic measure.    - DVT ppx: heparin subq  - GI ppx: none  - Diet:  soft to chew diet  - Code status: DNR and DNI; previously completed MOLST from 11/2021, completed by patient's HCP and son, Yuri Smith, is available in the patient's chart  - HCP: sonYuri  - Dispo: back to outpatient hospice  - Return to [facility] with assist for all functional mobility  - prepare for discharge

## 2023-05-11 NOTE — PROGRESS NOTE ADULT - SUBJECTIVE AND OBJECTIVE BOX
DATE OF SERVICE: 05-08-23 @ 01:59    Patient is a 87y old  Female who presents with a chief complaint of fractures (06 May 2023 10:34)      SUBJECTIVE / OVERNIGHT EVENTS:    MEDICATIONS  (STANDING):  acetaminophen   IVPB .. 675 milliGRAM(s) IV Intermittent once  cefTRIAXone   IVPB 1000 milliGRAM(s) IV Intermittent every 24 hours  citalopram 10 milliGRAM(s) Oral daily  donepezil 10 milliGRAM(s) Oral at bedtime  heparin   Injectable 5000 Unit(s) SubCutaneous every 12 hours  lidocaine   4% Patch 1 Patch Transdermal daily  metoprolol tartrate 25 milliGRAM(s) Oral two times a day    MEDICATIONS  (PRN):  acetaminophen   Oral Liquid .. 650 milliGRAM(s) Oral every 6 hours PRN Temp greater or equal to 38C (100.4F), Mild Pain (1 - 3)      Vital Signs Last 24 Hrs  T(C): 37.3 (07 May 2023 20:35), Max: 38.7 (07 May 2023 17:09)  T(F): 99.1 (07 May 2023 20:35), Max: 101.6 (07 May 2023 17:09)  HR: 93 (07 May 2023 20:35) (83 - 118)  BP: 120/82 (07 May 2023 20:35) (110/75 - 129/79)  BP(mean): --  RR: 18 (07 May 2023 20:35) (18 - 18)  SpO2: 94% (07 May 2023 20:35) (92% - 96%)    Parameters below as of 07 May 2023 20:35  Patient On (Oxygen Delivery Method): room air      CAPILLARY BLOOD GLUCOSE        I&O's Summary    06 May 2023 07:01  -  07 May 2023 07:00  --------------------------------------------------------  IN: 120 mL / OUT: 500 mL / NET: -380 mL    07 May 2023 07:01  -  08 May 2023 01:59  --------------------------------------------------------  IN: 290 mL / OUT: 50 mL / NET: 240 mL        PHYSICAL EXAM:  GENERAL: NAD, well-developed  HEAD:  Atraumatic, Normocephalic  EYES: EOMI, PERRLA, conjunctiva and sclera clear  NECK: Supple, No JVD  CHEST/LUNG: Clear to auscultation bilaterally; No wheeze  HEART: Regular rate and rhythm; No murmurs, rubs, or gallops  ABDOMEN: Soft, Nontender, Nondistended; Bowel sounds present  EXTREMITIES:  2+ Peripheral Pulses, No clubbing, cyanosis, or edema  PSYCH: AAOx1  NEUROLOGY: non-focal  SKIN: No rashes or lesions    LABS:                        10.6   8.81  )-----------( 279      ( 07 May 2023 06:29 )             33.5     05-07    141  |  106  |  23  ----------------------------<  109<H>  3.5   |  22  |  0.50    Ca    9.0      07 May 2023 06:27    TPro  6.4  /  Alb  3.3  /  TBili  1.0  /  DBili  x   /  AST  10  /  ALT  23  /  AlkPhos  121<H>  05-07              RADIOLOGY & ADDITIONAL TESTS:    Imaging Personally Reviewed:    Consultant(s) Notes Reviewed:      Care Discussed with Consultants/Other Providers:  
DATE OF SERVICE: 05-11-23 @ 14:01    Patient is a 87y old  Female who presents with a chief complaint of fractures (10 May 2023 14:47)      SUBJECTIVE / OVERNIGHT EVENTS:  No chest pain. No shortness of breath. No complaints. No events overnight.     MEDICATIONS  (STANDING):  acetaminophen   IVPB .. 675 milliGRAM(s) IV Intermittent once  cefTRIAXone   IVPB 1000 milliGRAM(s) IV Intermittent every 24 hours  citalopram 10 milliGRAM(s) Oral daily  donepezil 10 milliGRAM(s) Oral at bedtime  heparin   Injectable 5000 Unit(s) SubCutaneous every 12 hours  lidocaine   4% Patch 1 Patch Transdermal daily  metoprolol tartrate 25 milliGRAM(s) Oral two times a day    MEDICATIONS  (PRN):  acetaminophen   Oral Liquid .. 650 milliGRAM(s) Oral every 6 hours PRN Temp greater or equal to 38C (100.4F), Mild Pain (1 - 3)      Vital Signs Last 24 Hrs  T(C): 36.3 (11 May 2023 12:18), Max: 36.8 (10 May 2023 15:56)  T(F): 97.4 (11 May 2023 12:18), Max: 98.2 (10 May 2023 15:56)  HR: 100 (11 May 2023 12:18) (89 - 103)  BP: 111/68 (11 May 2023 12:18) (107/66 - 127/85)  BP(mean): --  RR: 18 (11 May 2023 12:18) (18 - 18)  SpO2: 94% (11 May 2023 12:18) (94% - 96%)    Parameters below as of 11 May 2023 12:18  Patient On (Oxygen Delivery Method): room air      CAPILLARY BLOOD GLUCOSE        I&O's Summary    10 May 2023 07:01  -  11 May 2023 07:00  --------------------------------------------------------  IN: 360 mL / OUT: 375 mL / NET: -15 mL    11 May 2023 07:01  -  11 May 2023 14:01  --------------------------------------------------------  IN: 320 mL / OUT: 0 mL / NET: 320 mL        PHYSICAL EXAM:  GENERAL: NAD, well-developed  HEAD:  Atraumatic, Normocephalic  EYES: EOMI, PERRLA, conjunctiva and sclera clear  NECK: Supple, No JVD  CHEST/LUNG: Clear to auscultation bilaterally; No wheeze  HEART: Regular rate and rhythm; No murmurs, rubs, or gallops  ABDOMEN: Soft, Nontender, Nondistended; Bowel sounds present  EXTREMITIES:  2+ Peripheral Pulses, No clubbing, cyanosis, or edema  NEUROLOGY: non-focal  SKIN: No rashes or lesions    LABS:                        10.6   6.19  )-----------( 274      ( 11 May 2023 11:30 )             33.9     05-11    145  |  109<H>  |  19  ----------------------------<  103<H>  4.1   |  23  |  0.42<L>    Ca    8.9      11 May 2023 11:30                RADIOLOGY & ADDITIONAL TESTS:    Imaging Personally Reviewed:    Consultant(s) Notes Reviewed:      Care Discussed with Consultants/Other Providers:  
DATE OF SERVICE: 23 @ 10:35    Patient is a 87y old  Female who presents with a chief complaint of fractures (05 May 2023 12:13)      SUBJECTIVE / OVERNIGHT EVENTS:  No chest pain. No shortness of breath. No complaints. No events overnight.     MEDICATIONS  (STANDING):  heparin   Injectable 5000 Unit(s) SubCutaneous every 12 hours  lidocaine   4% Patch 1 Patch Transdermal daily  metoprolol tartrate 25 milliGRAM(s) Oral two times a day    MEDICATIONS  (PRN):      Vital Signs Last 24 Hrs  T(C): 36.7 (06 May 2023 08:02), Max: 37.4 (05 May 2023 23:58)  T(F): 98.1 (06 May 2023 08:02), Max: 99.3 (05 May 2023 23:58)  HR: 86 (06 May 2023 08:02) (86 - 103)  BP: 142/77 (06 May 2023 08:02) (121/80 - 142/77)  BP(mean): --  RR: 18 (06 May 2023 08:02) (18 - 18)  SpO2: 94% (06 May 2023 08:02) (94% - 95%)    Parameters below as of 06 May 2023 08:02  Patient On (Oxygen Delivery Method): room air      CAPILLARY BLOOD GLUCOSE        I&O's Summary    05 May 2023 07:01  -  06 May 2023 07:00  --------------------------------------------------------  IN: 240 mL / OUT: 250 mL / NET: -10 mL        PHYSICAL EXAM:  GENERAL: NAD, well-developed  HEAD:  Atraumatic, Normocephalic  EYES: EOMI, PERRLA, conjunctiva and sclera clear  NECK: Supple, No JVD  CHEST/LUNG: Clear to auscultation bilaterally; No wheeze  HEART: Regular rate and rhythm; No murmurs, rubs, or gallops  ABDOMEN: Soft, Nontender, Nondistended; Bowel sounds present  EXTREMITIES:  2+ Peripheral Pulses, No clubbing, cyanosis, or edema  PSYCH: AAOx1  NEUROLOGY: non-focal, generalized weakness  SKIN: No rashes or lesions    LABS:                        10.0   8.39  )-----------( 267      ( 06 May 2023 06:31 )             32.1         145  |  110<H>  |  18  ----------------------------<  103<H>  4.1   |  25  |  0.49<L>    Ca    8.9      06 May 2023 06:31    TPro  6.3  /  Alb  3.1<L>  /  TBili  0.9  /  DBili  x   /  AST  20  /  ALT  41  /  AlkPhos  126<H>            Urinalysis Basic - ( 05 May 2023 18:15 )    Color: Dark Yellow / Appearance: Turbid / S.028 / pH: x  Gluc: x / Ketone: Negative  / Bili: Negative / Urobili: 8 mg/dL   Blood: x / Protein: 30 mg/dL / Nitrite: Negative   Leuk Esterase: Large / RBC: 40 /hpf / WBC 31 /HPF   Sq Epi: x / Non Sq Epi: x / Bacteria: Many        RADIOLOGY & ADDITIONAL TESTS:    Imaging Personally Reviewed:    Consultant(s) Notes Reviewed:      Care Discussed with Consultants/Other Providers:  
DATE OF SERVICE: 05-09-23 @ 16:18    Patient is a 87y old  Female who presents with a chief complaint of fractures (08 May 2023 14:38)      SUBJECTIVE / OVERNIGHT EVENTS:  responsive, minimally verbal, NAD, eating well    MEDICATIONS  (STANDING):  acetaminophen   IVPB .. 675 milliGRAM(s) IV Intermittent once  cefTRIAXone   IVPB 1000 milliGRAM(s) IV Intermittent every 24 hours  citalopram 10 milliGRAM(s) Oral daily  donepezil 10 milliGRAM(s) Oral at bedtime  heparin   Injectable 5000 Unit(s) SubCutaneous every 12 hours  lidocaine   4% Patch 1 Patch Transdermal daily  metoprolol tartrate 25 milliGRAM(s) Oral two times a day    MEDICATIONS  (PRN):  acetaminophen   Oral Liquid .. 650 milliGRAM(s) Oral every 6 hours PRN Temp greater or equal to 38C (100.4F), Mild Pain (1 - 3)      Vital Signs Last 24 Hrs  T(C): 36.9 (09 May 2023 12:02), Max: 36.9 (08 May 2023 19:59)  T(F): 98.4 (09 May 2023 12:02), Max: 98.4 (08 May 2023 19:59)  HR: 96 (09 May 2023 12:02) (93 - 120)  BP: 118/74 (09 May 2023 12:02) (111/72 - 135/81)  BP(mean): --  RR: 18 (09 May 2023 12:02) (18 - 20)  SpO2: 97% (09 May 2023 12:02) (93% - 97%)    Parameters below as of 09 May 2023 12:02  Patient On (Oxygen Delivery Method): room air      CAPILLARY BLOOD GLUCOSE        I&O's Summary    08 May 2023 07:01  -  09 May 2023 07:00  --------------------------------------------------------  IN: 360 mL / OUT: 300 mL / NET: 60 mL    09 May 2023 07:01  -  09 May 2023 16:18  --------------------------------------------------------  IN: 340 mL / OUT: 100 mL / NET: 240 mL        PHYSICAL EXAM:  GENERAL: NAD, well-developed  HEAD:  Atraumatic, Normocephalic  EYES: EOMI, PERRLA, conjunctiva and sclera clear  NECK: Supple, No JVD  CHEST/LUNG: Clear to auscultation bilaterally; No wheeze  HEART: Regular rate and rhythm; No murmurs, rubs, or gallops  ABDOMEN: Soft, Nontender, Nondistended; Bowel sounds present  EXTREMITIES:  2+ Peripheral Pulses, No clubbing, cyanosis, or edema  PSYCH: AAOx1  NEUROLOGY: non-focal  SKIN: No rashes or lesions    LABS:                        10.5   7.61  )-----------( 283      ( 09 May 2023 05:13 )             33.2     05-09    145  |  111<H>  |  23  ----------------------------<  125<H>  3.6   |  22  |  0.47<L>    Ca    8.8      09 May 2023 05:13                RADIOLOGY & ADDITIONAL TESTS:    Imaging Personally Reviewed:    Consultant(s) Notes Reviewed:      Care Discussed with Consultants/Other Providers:  
87 year old female with a PMHx of Alzheimer's disease who presents from [place] memory care unit and outpatient hospice for right arm fracture. History from the patient herself is limited d/t underlying dementia. Per chart review, the patient had an unwitnessed fall the weekend of presentation. Staffed noticed a new bruise in the RUE. Outpatient X rays revealed fractures.    ED course: pt found to have a R proximal humerus fracture and R sup/inf rami and R Sacral ala fracture. She was started on NS at 75 cc/hr and given ofirmev. (04 May 2023 23:02)    05-05-23 @ 12:13  PAST MEDICAL & SURGICAL HISTORY:  Alzheimer disease      History of total knee arthroplasty, left      H/O total hip arthroplasty, right          Review of Systems:   UTO  Allergies    No Known Allergies    Intolerances        Social History:     FAMILY HISTORY:      MEDICATIONS  (STANDING):  lidocaine   4% Patch 1 Patch Transdermal daily    MEDICATIONS  (PRN):      Vital Signs Last 24 Hrs  T(C): 36.4 (05 May 2023 12:02), Max: 36.9 (05 May 2023 01:52)  T(F): 97.5 (05 May 2023 12:02), Max: 98.4 (05 May 2023 01:52)  HR: 101 (05 May 2023 12:02) (73 - 103)  BP: 137/84 (05 May 2023 12:02) (104/72 - 141/86)  BP(mean): 95 (05 May 2023 01:52) (95 - 95)  RR: 18 (05 May 2023 12:02) (16 - 20)  SpO2: 95% (05 May 2023 12:02) (95% - 97%)    Parameters below as of 05 May 2023 12:02  Patient On (Oxygen Delivery Method): room air      CAPILLARY BLOOD GLUCOSE        I&O's Summary      PHYSICAL EXAM:  GENERAL: NAD, well-developed  HEAD:  Atraumatic, Normocephalic  EYES: EOMI, PERRLA, conjunctiva and sclera clear  NECK: Supple, No JVD  CHEST/LUNG: Clear to auscultation bilaterally; No wheeze  HEART: Regular rate and rhythm; No murmurs, rubs, or gallops  ABDOMEN: Soft, Nontender, Nondistended; Bowel sounds present  EXTREMITIES:  2+ Peripheral Pulses, No clubbing, cyanosis, or edema  PSYCH: AAOx1 only knows first name  SKIN: No rashes or lesions    LABS:                        10.3   7.83  )-----------( 262      ( 04 May 2023 19:10 )             33.2     05-04    146<H>  |  112<H>  |  27<H>  ----------------------------<  155<H>  3.8   |  24  |  0.51    Ca    8.6      04 May 2023 19:10    TPro  6.3  /  Alb  3.1<L>  /  TBili  0.9  /  DBili  x   /  AST  20  /  ALT  41  /  AlkPhos  126<H>  05-04      < from: CT Pelvis Reform No Cont (05.04.23 @ 18:50) >  INTERPRETATION:  CT PELVIS REFORMAT, CT 3D RECONSTRUCTION W WORKSTATION   dated 5/4/2023 6:50 PM    INDICATION: Pain after fall    COMPARISON: Radiographs of the same date. CT of the abdomen pelvis dated   11/18/2021    TECHNIQUE: CT imaging of the pelvis was performed. The data was   reformatted in the axial, coronal, and sagittal planes. Additionally, 3-D   reformatted imaging was created.    FINDINGS:    OSSEOUS STRUCTURES: There is a right hip arthroplasty with proximal   cerclage wire. A left hip ORIF is noted. Moderate beam hardening artifact  from the hardware limits evaluation. Within this limitation, there is a   comminuted mildly displaced fracture of the right inferior pubic ramus. A   nondisplaced mildly comminuted fracture right superior pubic ramus   fracture is seen. There is a nondisplaced fracture of the right sacral   ala. There is a subacute fracture of the L4 vertebral body superior   endplate and the L3 vertebral body superior endplate. Degenerative   changes of the lower lumbar spine are noted. Bone island is appreciated   within the right posterior iliac bone. Heterotopic ossification noted   adjacent to the right hip arthroplasty.  SYNOVIUM/ JOINT FLUID: Small joint effusion on the right  TENDONS: Intact tendons.  MUSCLES: Right greater than left abductor muscleatrophy  NEUROVASCULAR STRUCTURES: Preserved  INTRAPELVIC SOFT TISSUES: Calcified uterine fibroids. Colonic   diverticulosis.  SUBCUTANEOUS SOFT TISSUES: Mild soft tissue swelling on the right hip.    3-D reformatted imaging confirms these findings.    IMPRESSION:    1.  Right superior and inferior pubic rami fractures.  2.  Nondisplaced right sacral alar fracture.  3.  Progressed L3 and new L4 upper endplate fractures when compared to   11/18/2021    --- End of Report ---      < end of copied text >          RADIOLOGY & ADDITIONAL TESTS:    Imaging Personally Reviewed:    Consultant(s) Notes Reviewed:      Care Discussed with Consultants/Other Providers:  
DATE OF SERVICE: 05-08-23 @ 14:38    Patient is a 87y old  Female who presents with a chief complaint of fractures (07 May 2023 20:58)      SUBJECTIVE / OVERNIGHT EVENTS:  doing better today.  eating well    MEDICATIONS  (STANDING):  acetaminophen   IVPB .. 675 milliGRAM(s) IV Intermittent once  cefTRIAXone   IVPB 1000 milliGRAM(s) IV Intermittent every 24 hours  citalopram 10 milliGRAM(s) Oral daily  donepezil 10 milliGRAM(s) Oral at bedtime  heparin   Injectable 5000 Unit(s) SubCutaneous every 12 hours  lidocaine   4% Patch 1 Patch Transdermal daily  metoprolol tartrate 25 milliGRAM(s) Oral two times a day    MEDICATIONS  (PRN):  acetaminophen   Oral Liquid .. 650 milliGRAM(s) Oral every 6 hours PRN Temp greater or equal to 38C (100.4F), Mild Pain (1 - 3)      Vital Signs Last 24 Hrs  T(C): 37 (08 May 2023 12:10), Max: 38.7 (07 May 2023 17:09)  T(F): 98.6 (08 May 2023 12:10), Max: 101.6 (07 May 2023 17:09)  HR: 104 (08 May 2023 12:10) (86 - 118)  BP: 125/82 (08 May 2023 12:10) (120/82 - 138/81)  BP(mean): --  RR: 18 (08 May 2023 12:10) (18 - 18)  SpO2: 95% (08 May 2023 12:10) (94% - 99%)    Parameters below as of 08 May 2023 12:10  Patient On (Oxygen Delivery Method): room air      CAPILLARY BLOOD GLUCOSE        I&O's Summary    07 May 2023 07:01  -  08 May 2023 07:00  --------------------------------------------------------  IN: 290 mL / OUT: 50 mL / NET: 240 mL    08 May 2023 07:01  -  08 May 2023 14:38  --------------------------------------------------------  IN: 0 mL / OUT: 150 mL / NET: -150 mL        PHYSICAL EXAM:  GENERAL: NAD, well-developed  HEAD:  Atraumatic, Normocephalic  EYES: EOMI, PERRLA, conjunctiva and sclera clear  NECK: Supple, No JVD  CHEST/LUNG: Clear to auscultation bilaterally; No wheeze  HEART: Regular rate and rhythm; No murmurs, rubs, or gallops  ABDOMEN: Soft, Nontender, Nondistended; Bowel sounds present  EXTREMITIES:  2+ Peripheral Pulses, No clubbing, cyanosis, or edema  PSYCH: AAOx1  NEUROLOGY: non-focal  SKIN: No rashes or lesions    LABS:                        10.6   8.81  )-----------( 279      ( 07 May 2023 06:29 )             33.5     05-07    141  |  106  |  23  ----------------------------<  109<H>  3.5   |  22  |  0.50    Ca    9.0      07 May 2023 06:27    TPro  6.4  /  Alb  3.3  /  TBili  1.0  /  DBili  x   /  AST  10  /  ALT  23  /  AlkPhos  121<H>  05-07              RADIOLOGY & ADDITIONAL TESTS:    Imaging Personally Reviewed:    Consultant(s) Notes Reviewed:      Care Discussed with Consultants/Other Providers:  
DATE OF SERVICE: 05-10-23 @ 14:47    Patient is a 87y old  Female who presents with a chief complaint of Chart Reviewed, Events Noted  "87 year old female with a PMHx of Alzheimer's disease who presents from [place] memory care unit and outpatient hospice for right arm fracture. History from the patient herself is limited d/t underlying dementia. Per chart review, the patient had an unwitnessed fall the weekend of presentation. Staffed noticed a new bruise in the RUE. Outpatient X rays revealed fractures."     (10 May 2023 11:25)      SUBJECTIVE / OVERNIGHT EVENTS:  NAD    MEDICATIONS  (STANDING):  acetaminophen   IVPB .. 675 milliGRAM(s) IV Intermittent once  cefTRIAXone   IVPB 1000 milliGRAM(s) IV Intermittent every 24 hours  citalopram 10 milliGRAM(s) Oral daily  donepezil 10 milliGRAM(s) Oral at bedtime  heparin   Injectable 5000 Unit(s) SubCutaneous every 12 hours  lidocaine   4% Patch 1 Patch Transdermal daily  metoprolol tartrate 25 milliGRAM(s) Oral two times a day    MEDICATIONS  (PRN):  acetaminophen   Oral Liquid .. 650 milliGRAM(s) Oral every 6 hours PRN Temp greater or equal to 38C (100.4F), Mild Pain (1 - 3)      Vital Signs Last 24 Hrs  T(C): 36.7 (10 May 2023 11:52), Max: 37 (09 May 2023 21:17)  T(F): 98 (10 May 2023 11:52), Max: 98.6 (09 May 2023 21:17)  HR: 78 (10 May 2023 11:52) (78 - 99)  BP: 109/71 (10 May 2023 11:52) (109/71 - 137/89)  BP(mean): --  RR: 18 (10 May 2023 11:52) (18 - 18)  SpO2: 96% (10 May 2023 11:52) (94% - 97%)    Parameters below as of 10 May 2023 11:52  Patient On (Oxygen Delivery Method): room air      CAPILLARY BLOOD GLUCOSE        I&O's Summary    09 May 2023 07:01  -  10 May 2023 07:00  --------------------------------------------------------  IN: 440 mL / OUT: 250 mL / NET: 190 mL    10 May 2023 07:01  -  10 May 2023 14:47  --------------------------------------------------------  IN: 240 mL / OUT: 225 mL / NET: 15 mL        PHYSICAL EXAM:  GENERAL: NAD, well-developed  HEAD:  Atraumatic, Normocephalic  EYES: EOMI, PERRLA, conjunctiva and sclera clear  NECK: Supple, No JVD  CHEST/LUNG: Clear to auscultation bilaterally; No wheeze  HEART: Regular rate and rhythm; No murmurs, rubs, or gallops  ABDOMEN: Soft, Nontender, Nondistended; Bowel sounds present  EXTREMITIES:  2+ Peripheral Pulses, No clubbing, cyanosis, or edema  PSYCH: AAOx3  NEUROLOGY: non-focal  SKIN: No rashes or lesions    LABS:                        10.5   7.61  )-----------( 283      ( 09 May 2023 05:13 )             33.2     05-09    145  |  111<H>  |  23  ----------------------------<  125<H>  3.6   |  22  |  0.47<L>    Ca    8.8      09 May 2023 05:13                RADIOLOGY & ADDITIONAL TESTS:    Imaging Personally Reviewed:    Consultant(s) Notes Reviewed:      Care Discussed with Consultants/Other Providers:

## 2023-05-11 NOTE — DISCHARGE NOTE NURSING/CASE MANAGEMENT/SOCIAL WORK - PATIENT PORTAL LINK FT
You can access the FollowMyHealth Patient Portal offered by [facility] by registering at the following website: [email or web address]. By joining [employer]’s FollowMyHealth portal, you will also be able to view your health information using other applications (apps) compatible with our system.

## 2023-05-12 LAB
CULTURE RESULTS: SIGNIFICANT CHANGE UP
SPECIMEN SOURCE: SIGNIFICANT CHANGE UP

## 2023-08-25 NOTE — ED ADULT NURSE NOTE - OBJECTIVE STATEMENT
88 y/o female presents to ED via EMS from [facility] for R shoulder injury. EMS reports patient is coming from memory care unit at [facility] where swelling and ecchymosis was noted to RUE. EMS reports staff did not witness a fall. On exam, AOx1, responds to name. Unlabored, spontaneous respirations, NAD. Ecchymosis noted to RUE and chest. +2 peripheral pulses. Awaiting evaluation by MD at this time.
27yo male denies PMHx presenting with left elbow pain s/p fall earlier today at a concert. Pt says he fell forward on his elbow without head trauma or LOC, no other injuries or pain. No numbness or weakness. Tdap utd.

## 2024-02-07 NOTE — PHYSICAL THERAPY INITIAL EVALUATION ADULT - RANGE OF MOTION EXAMINATION, REHAB EVAL
No
bilateral upper extremity ROM was WFL (within functional limits)/bilateral lower extremity ROM was WFL (within functional limits)

## 2024-03-18 NOTE — PROGRESS NOTE ADULT - SUBJECTIVE AND OBJECTIVE BOX
Patient seen and examined. Pain controlled. No acute events overnight.    Vital Signs Last 24 Hrs  T(C): 36.7 (02-07-19 @ 04:51), Max: 37.3 (02-06-19 @ 16:39)  T(F): 98 (02-07-19 @ 04:51), Max: 99.1 (02-06-19 @ 16:39)  HR: 87 (02-07-19 @ 04:51) (87 - 106)  BP: 122/79 (02-07-19 @ 04:51) (116/80 - 140/82)  BP(mean): --  RR: 18 (02-07-19 @ 04:51) (17 - 18)  SpO2: 96% (02-07-19 @ 04:51) (95% - 97%)    Physical Exam  Gen: NAD  LLE:   wounds clean  +EHL/FHL/Gsc/TA  SILT L3-S1  DP +  Compartments soft  No calf ttp    A/P: 83y Female sp Lhip IMN POD 5  Pain control  DVT ppx  PT/OT, WBAT  Dispo planning- rehab  D/w attending Other, please explain:

## 2024-03-18 NOTE — DISCHARGE NOTE ADULT - NS AS DC STROKE DX YN
Emmy Badillo is a 63 y.o. , female who comes for Follow up for Type 2 and Graves' disease  PCP: Faisal Arnold MD    HPI   This patient was diagnosed with diabetes mellitus 11, years ago.   Current therapy includes 0.5 mg weekly.  The patient is finding it extremely difficult to adhere to her dietary program.  She is hungry all the time.  Otherwise she is now using an exercise bike and has been very pleased with it.  She did not bring in her blood sugars for my review.  Patient is being followed by the eye doctor.  She reports no foot problems.  Regarding thyroid, she is complaining of fatigue and dryness of the eyes.  Otherwise there is no heat intolerance, palpitations or tremors.  For cholesterol this patient is taking Zetia.  She has had problems with statin therapy.    Past Medical History:   Diagnosis Date    COPD (chronic obstructive pulmonary disease) (HCC)     Degenerative disc disease 1996    Depression 07/2016    Diverticulitis     Graves disease 2011    Hyperlipidemia 2010    Hypertension 2011    Spinal stenosis 04/2015    Type II or unspecified type diabetes mellitus without mention of complication, not stated as uncontrolled 2014    Vitamin D deficiency 2012      Past Surgical History:   Procedure Laterality Date    CERVICAL SPINE SURGERY  1996    COLONOSCOPY  2014, 2018    [facility], [facility]    HYSTERECTOMY (CERVIX STATUS UNKNOWN)  1995    complete, bleeding    NECK SURGERY  1996    OTHER SURGICAL HISTORY  1 Dec 2015     Bilateral L4/5 Laminectomies, microdiskectomies (Dr. Catalan)       Family History   Problem Relation Age of Onset    Cancer Mother 72        Pancreatic Cancer    COPD Father     Diabetes Father     Diabetes Brother 30    Other Maternal Grandfather         Pneumonia    Rheum Arthritis Maternal Grandfather     Diabetes Paternal Grandmother     Other Paternal Grandmother         Enlarged Heart    Heart Disease Paternal Grandfather     Other Paternal Grandfather         Aorta Ruptured 
no

## 2024-04-23 NOTE — DISCHARGE NOTE PROVIDER - HOSPITAL COURSE
87F PMH Dementia, HTN, presents from [facility] for a LLE wound/rash noticed this morning by staff. Reportedly 2 days ago pt with dark spots on LLE ant shin but this morning the   staff noticed it was red. Pt only able to give limited history as she has dementia. Per EMS, pt hasn't had fevers and is reportedly at baseline mental status per home staff.   seen by ID, started on Cefazolin. Redness improved. Had wound care consult for LLE multiple wounds. CW wound care. Now erythema improved. DC back to longterm on Keflex.       87F PMH Dementia, HTN, presents from [facility] for a LLE wound/rash noticed this morning by staff. Reportedly 2 days ago pt with dark spots on LLE ant shin but this morning the   staff noticed it was red. Pt only able to give limited history as she has dementia. Per EMS, pt hasn't had fevers and is reportedly at baseline mental status per home staff.   seen by ID, started on Cefazolin. Redness improved. Had wound care consult for LLE multiple wounds. CW wound care. Now erythema improved. DC back to [facility] on Keflex. through3/15  Discharge/Dispo/Med rec DW Dr Hicks.       87F PMH Dementia, HTN, presents from [facility] for a LLE wound/rash noticed this morning by staff. Reportedly 2 days ago pt with dark spots on LLE ant shin but this morning the   staff noticed it was red. Pt only able to give limited history as she has dementia. Per EMS, pt hasn't had fevers and is reportedly at baseline mental status per home staff.   seen by ID, started on Cefazolin. Redness improved. Had wound care consult for LLE multiple wounds. CW wound care. Now erythema improved. DC back to [facility] on Keflex. through3/15  Discharge/Dispo/Med rec VY Hicks. Patient is being discharged back to [facility] under hospice care. Hospice nurse asked to order the following meds--Morphine iR, Ativan, prochlor perazine, hyoscyamine, haloperidol and Tylenol. VY Hicks, he agreed , meds sent to pharmacy. Cleared for discharge.        87F PMH Dementia, HTN, presents from [facility] for a LLE wound/rash noticed this morning by staff. Reportedly 2 days ago pt with dark spots on LLE ant shin but this morning the   staff noticed it was red. Pt only able to give limited history as she has dementia. Per EMS, pt hasn't had fevers and is reportedly at baseline mental status per home staff.   seen by ID, started on Cefazolin. Redness improved. Had wound care consult for LLE multiple wounds. CW wound care. Now erythema improved. DC back to [facility] on Keflex. through3/15  Discharge/Dispo/Med rec VY Hicks. Patient is being discharged back to [facility] under hospice care starting only on 3/16/23.  Disch/med rec VY Hicks. Cleared for discharge.      Calcipotriene Pregnancy And Lactation Text: This medication has not been proven safe during pregnancy. It is unknown if this medication is excreted in breast milk. Clofazimine Pregnancy And Lactation Text: This medication is Pregnancy Category C and isn't considered safe during pregnancy. It is excreted in breast milk. Birth Control Pills Pregnancy And Lactation Text: This medication should be avoided if pregnant and for the first 30 days post-partum. Humira Pregnancy And Lactation Text: This medication is Pregnancy Category B and is considered safe during pregnancy. It is unknown if this medication is excreted in breast milk. Odomzo Counseling- I discussed with the patient the risks of Odomzo including but not limited to nausea, vomiting, diarrhea, constipation, weight loss, changes in the sense of taste, decreased appetite, muscle spasms, and hair loss.  The patient verbalized understanding of the proper use and possible adverse effects of Odomzo.  All of the patient's questions and concerns were addressed. Siliq Counseling:  I discussed with the patient the risks of Siliq including but not limited to new or worsening depression, suicidal thoughts and behavior, immunosuppression, malignancy, posterior leukoencephalopathy syndrome, and serious infections.  The patient understands that monitoring is required including a PPD at baseline and must alert us or the primary physician if symptoms of infection or other concerning signs are noted. There is also a special program designed to monitor depression which is required with Siliq. Sski Pregnancy And Lactation Text: This medication is Pregnancy Category D and isn't considered safe during pregnancy. It is excreted in breast milk. Xeljanz Counseling: I discussed with the patient the risks of Xeljanz therapy including increased risk of infection, liver issues, headache, diarrhea, or cold symptoms. Live vaccines should be avoided. They were instructed to call if they have any problems. Azithromycin Pregnancy And Lactation Text: This medication is considered safe during pregnancy and is also secreted in breast milk. Tazorac Pregnancy And Lactation Text: This medication is not safe during pregnancy. It is unknown if this medication is excreted in breast milk. Picato Counseling:  I discussed with the patient the risks of Picato including but not limited to erythema, scaling, itching, weeping, crusting, and pain. Hydroxychloroquine Counseling:  I discussed with the patient that a baseline ophthalmologic exam is needed at the start of therapy and every year thereafter while on therapy. A CBC may also be warranted for monitoring.  The side effects of this medication were discussed with the patient, including but not limited to agranulocytosis, aplastic anemia, seizures, rashes, retinopathy, and liver toxicity. Patient instructed to call the office should any adverse effect occur.  The patient verbalized understanding of the proper use and possible adverse effects of Plaquenil.  All the patient's questions and concerns were addressed. Taltz Pregnancy And Lactation Text: The risk during pregnancy and breastfeeding is uncertain with this medication. Hydroxyzine Pregnancy And Lactation Text: This medication is not safe during pregnancy and should not be taken. It is also excreted in breast milk and breast feeding isn't recommended. Sarecycline Counseling: Patient advised regarding possible photosensitivity and discoloration of the teeth, skin, lips, tongue and gums.  Patient instructed to avoid sunlight, if possible.  When exposed to sunlight, patients should wear protective clothing, sunglasses, and sunscreen.  The patient was instructed to call the office immediately if the following severe adverse effects occur:  hearing changes, easy bruising/bleeding, severe headache, or vision changes.  The patient verbalized understanding of the proper use and possible adverse effects of sarecycline.  All of the patient's questions and concerns were addressed. Topical Clindamycin Counseling: Patient counseled that this medication may cause skin irritation or allergic reactions.  In the event of skin irritation, the patient was advised to reduce the amount of the drug applied or use it less frequently.   The patient verbalized understanding of the proper use and possible adverse effects of clindamycin.  All of the patient's questions and concerns were addressed. Opzelura Pregnancy And Lactation Text: There is insufficient data to evaluate drug-associated risk for major birth defects, miscarriage, or other adverse maternal or fetal outcomes.  There is a pregnancy registry that monitors pregnancy outcomes in pregnant persons exposed to the medication during pregnancy.  It is unknown if this medication is excreted in breast milk.  Do not breastfeed during treatment and for about 4 weeks after the last dose. Rifampin Pregnancy And Lactation Text: This medication is Pregnancy Category C and it isn't know if it is safe during pregnancy. It is also excreted in breast milk and should not be used if you are breast feeding. Hydroxychloroquine Pregnancy And Lactation Text: This medication has been shown to cause fetal harm but it isn't assigned a Pregnancy Risk Category. There are small amounts excreted in breast milk. Tremfya Counseling: I discussed with the patient the risks of guselkumab including but not limited to immunosuppression, serious infections, and drug reactions.  The patient understands that monitoring is required including a PPD at baseline and must alert us or the primary physician if symptoms of infection or other concerning signs are noted. Winlevi Pregnancy And Lactation Text: This medication is considered safe during pregnancy and breastfeeding. Cibinqo Counseling: I discussed with the patient the risks of Cibinqo therapy including but not limited to common cold, nausea, headache, cold sores, increased blood CPK levels, dizziness, UTIs, fatigue, acne, and vomitting. Live vaccines should be avoided.  This medication has been linked to serious infections; higher rate of mortality; malignancy and lymphoproliferative disorders; major adverse cardiovascular events; thrombosis; thrombocytopenia and lymphopenia; lipid elevations; and retinal detachment. Hydroquinone Counseling:  Patient advised that medication may result in skin irritation, lightening (hypopigmentation), dryness, and burning.  In the event of skin irritation, the patient was advised to reduce the amount of the drug applied or use it less frequently.  Rarely, spots that are treated with hydroquinone can become darker (pseudoochronosis).  Should this occur, patient instructed to stop medication and call the office. The patient verbalized understanding of the proper use and possible adverse effects of hydroquinone.  All of the patient's questions and concerns were addressed. Itraconazole Counseling:  I discussed with the patient the risks of itraconazole including but not limited to liver damage, nausea/vomiting, neuropathy, and severe allergy.  The patient understands that this medication is best absorbed when taken with acidic beverages such as non-diet cola or ginger ale.  The patient understands that monitoring is required including baseline LFTs and repeat LFTs at intervals.  The patient understands that they are to contact us or the primary physician if concerning signs are noted. Doxepin Pregnancy And Lactation Text: This medication is Pregnancy Category C and it isn't known if it is safe during pregnancy. It is also excreted in breast milk and breast feeding isn't recommended. Cimzia Counseling:  I discussed with the patient the risks of Cimzia including but not limited to immunosuppression, allergic reactions and infections.  The patient understands that monitoring is required including a PPD at baseline and must alert us or the primary physician if symptoms of infection or other concerning signs are noted. Oral Minoxidil Counseling- I discussed with the patient the risks of oral minoxidil including but not limited to shortness of breath, swelling of the feet or ankles, dizziness, lightheadedness, unwanted hair growth and allergic reaction.  The patient verbalized understanding of the proper use and possible adverse effects of oral minoxidil.  All of the patient's questions and concerns were addressed. Erythromycin Counseling:  I discussed with the patient the risks of erythromycin including but not limited to GI upset, allergic reaction, drug rash, diarrhea, increase in liver enzymes, and yeast infections. High Dose Vitamin A Counseling: Side effects reviewed, pt to contact office should one occur. 5-Fu Counseling: 5-Fluorouracil Counseling:  I discussed with the patient the risks of 5-fluorouracil including but not limited to erythema, scaling, itching, weeping, crusting, and pain. Oral Minoxidil Pregnancy And Lactation Text: This medication should only be used when clearly needed if you are pregnant, attempting to become pregnant or breast feeding. Bactrim Counseling:  I discussed with the patient the risks of sulfa antibiotics including but not limited to GI upset, allergic reaction, drug rash, diarrhea, dizziness, photosensitivity, and yeast infections.  Rarely, more serious reactions can occur including but not limited to aplastic anemia, agranulocytosis, methemoglobinemia, blood dyscrasias, liver or kidney failure, lung infiltrates or desquamative/blistering drug rashes. Picato Pregnancy And Lactation Text: This medication is Pregnancy Category C. It is unknown if this medication is excreted in breast milk. Xelleeannez Pregnancy And Lactation Text: This medication is Pregnancy Category D and is not considered safe during pregnancy.  The risk during breast feeding is also uncertain. Albendazole Counseling:  I discussed with the patient the risks of albendazole including but not limited to cytopenia, kidney damage, nausea/vomiting and severe allergy.  The patient understands that this medication is being used in an off-label manner. Colchicine Counseling:  Patient counseled regarding adverse effects including but not limited to stomach upset (nausea, vomiting, stomach pain, or diarrhea).  Patient instructed to limit alcohol consumption while taking this medication.  Colchicine may reduce blood counts especially with prolonged use.  The patient understands that monitoring of kidney function and blood counts may be required, especially at baseline. The patient verbalized understanding of the proper use and possible adverse effects of colchicine.  All of the patient's questions and concerns were addressed. Thalidomide Counseling: I discussed with the patient the risks of thalidomide including but not limited to birth defects, anxiety, weakness, chest pain, dizziness, cough and severe allergy. Spironolactone Counseling: Patient advised regarding risks of diarrhea, abdominal pain, hyperkalemia, birth defects (for female patients), liver toxicity and renal toxicity. The patient may need blood work to monitor liver and kidney function and potassium levels while on therapy. The patient verbalized understanding of the proper use and possible adverse effects of spironolactone.  All of the patient's questions and concerns were addressed. Hyrimoz Counseling:  I discussed with the patient the risks of adalimumab including but not limited to myelosuppression, immunosuppression, autoimmune hepatitis, demyelinating diseases, lymphoma, and serious infections.  The patient understands that monitoring is required including a PPD at baseline and must alert us or the primary physician if symptoms of infection or other concerning signs are noted. Detail Level: Detailed Odomzo Pregnancy And Lactation Text: This medication is Pregnancy Category X and is absolutely contraindicated during pregnancy. It is unknown if it is excreted in breast milk. Cyclophosphamide Pregnancy And Lactation Text: This medication is Pregnancy Category D and it isn't considered safe during pregnancy. This medication is excreted in breast milk. Litfulo Counseling: Litfulo (Ritlecitinib) Counseling: I discussed with the patient the risks of Litfulo therapy including but not limited to headache, upper respiratory infections, nausea, diarrhea, acne, and urticaria. Live vaccines should be avoided.  This medication has been linked to serious infections; higher rate of mortality; malignancy and lymphoproliferative disorders; major adverse cardiovascular events; thrombosis; decreases in lymphocytes and platelets; liver enzyme elevations; and CPK elevations. Sarecycline Pregnancy And Lactation Text: This medication is Pregnancy Category D and not consider safe during pregnancy. It is also excreted in breast milk. Simponi Counseling:  I discussed with the patient the risks of golimumab including but not limited to myelosuppression, immunosuppression, autoimmune hepatitis, demyelinating diseases, lymphoma, and serious infections.  The patient understands that monitoring is required including a PPD at baseline and must alert us or the primary physician if symptoms of infection or other concerning signs are noted. Cibinqo Pregnancy And Lactation Text: It is unknown if this medication will adversely affect pregnancy or breast feeding.  You should not take this medication if you are currently pregnant or planning a pregnancy or while breastfeeding. Hydroquinone Pregnancy And Lactation Text: This medication has not been assigned a Pregnancy Risk Category but animal studies failed to show danger with the topical medication. It is unknown if the medication is excreted in breast milk. Itraconazole Pregnancy And Lactation Text: This medication is Pregnancy Category C and it isn't know if it is safe during pregnancy. It is also excreted in breast milk. Low Dose Naltrexone Counseling- I discussed with the patient the potential risks and side effects of low dose naltrexone including but not limited to: more vivid dreams, headaches, nausea, vomiting, abdominal pain, fatigue, dizziness, and anxiety. Topical Clindamycin Pregnancy And Lactation Text: This medication is Pregnancy Category B and is considered safe during pregnancy. It is unknown if it is excreted in breast milk. High Dose Vitamin A Pregnancy And Lactation Text: High dose vitamin A therapy is contraindicated during pregnancy and breast feeding. Hydroxyzine Counseling: Patient advised that the medication is sedating and not to drive a car after taking this medication.  Patient informed of potential adverse effects including but not limited to dry mouth, urinary retention, and blurry vision.  The patient verbalized understanding of the proper use and possible adverse effects of hydroxyzine.  All of the patient's questions and concerns were addressed. Erythromycin Pregnancy And Lactation Text: This medication is Pregnancy Category B and is considered safe during pregnancy. It is also excreted in breast milk. Zyclara Counseling:  I discussed with the patient the risks of imiquimod including but not limited to erythema, scaling, itching, weeping, crusting, and pain.  Patient understands that the inflammatory response to imiquimod is variable from person to person and was educated regarded proper titration schedule.  If flu-like symptoms develop, patient knows to discontinue the medication and contact us. Cimzia Pregnancy And Lactation Text: This medication crosses the placenta but can be considered safe in certain situations. Cimzia may be excreted in breast milk. Azelaic Acid Counseling: Patient counseled that medicine may cause skin irritation and to avoid applying near the eyes.  In the event of skin irritation, the patient was advised to reduce the amount of the drug applied or use it less frequently.   The patient verbalized understanding of the proper use and possible adverse effects of azelaic acid.  All of the patient's questions and concerns were addressed. Dutasteride Male Counseling: Dustasteride Counseling:  I discussed with the patient the risks of use of dutasteride including but not limited to decreased libido, decreased ejaculate volume, and gynecomastia. Women who can become pregnant should not handle medication.  All of the patient's questions and concerns were addressed. Otezla Counseling: The side effects of Otezla were discussed with the patient, including but not limited to worsening or new depression, weight loss, diarrhea, nausea, upper respiratory tract infection, and headache. Patient instructed to call the office should any adverse effect occur.  The patient verbalized understanding of the proper use and possible adverse effects of Otezla.  All the patient's questions and concerns were addressed. Cosentyx Counseling:  I discussed with the patient the risks of Cosentyx including but not limited to worsening of Crohn's disease, immunosuppression, allergic reactions and infections.  The patient understands that monitoring is required including a PPD at baseline and must alert us or the primary physician if symptoms of infection or other concerning signs are noted. Spironolactone Pregnancy And Lactation Text: This medication can cause feminization of the male fetus and should be avoided during pregnancy. The active metabolite is also found in breast milk. Cyclosporine Counseling:  I discussed with the patient the risks of cyclosporine including but not limited to hypertension, gingival hyperplasia,myelosuppression, immunosuppression, liver damage, kidney damage, neurotoxicity, lymphoma, and serious infections. The patient understands that monitoring is required including baseline blood pressure, CBC, CMP, lipid panel and uric acid, and then 1-2 times monthly CMP and blood pressure. Tetracycline Counseling: Patient counseled regarding possible photosensitivity and increased risk for sunburn.  Patient instructed to avoid sunlight, if possible.  When exposed to sunlight, patients should wear protective clothing, sunglasses, and sunscreen.  The patient was instructed to call the office immediately if the following severe adverse effects occur:  hearing changes, easy bruising/bleeding, severe headache, or vision changes.  The patient verbalized understanding of the proper use and possible adverse effects of tetracycline.  All of the patient's questions and concerns were addressed. Patient understands to avoid pregnancy while on therapy due to potential birth defects. Litfulo Pregnancy And Lactation Text: There is insufficient data to evaluate whether or not Litfulo is safe to use during pregnancy.  Breastfeeding is not recommended during treatment. Ketoconazole Pregnancy And Lactation Text: This medication is Pregnancy Category C and it isn't know if it is safe during pregnancy. It is also excreted in breast milk and breast feeding isn't recommended. Albendazole Pregnancy And Lactation Text: This medication is Pregnancy Category C and it isn't known if it is safe during pregnancy. It is also excreted in breast milk. Metronidazole Pregnancy And Lactation Text: This medication is Pregnancy Category B and considered safe during pregnancy.  It is also excreted in breast milk. Dapsone Counseling: I discussed with the patient the risks of dapsone including but not limited to hemolytic anemia, agranulocytosis, rashes, methemoglobinemia, kidney failure, peripheral neuropathy, headaches, GI upset, and liver toxicity.  Patients who start dapsone require monitoring including baseline LFTs and weekly CBCs for the first month, then every month thereafter.  The patient verbalized understanding of the proper use and possible adverse effects of dapsone.  All of the patient's questions and concerns were addressed. Imiquimod Counseling:  I discussed with the patient the risks of imiquimod including but not limited to erythema, scaling, itching, weeping, crusting, and pain.  Patient understands that the inflammatory response to imiquimod is variable from person to person and was educated regarded proper titration schedule.  If flu-like symptoms develop, patient knows to discontinue the medication and contact us. Ketoconazole Counseling:   Patient counseled regarding improving absorption with orange juice.  Adverse effects include but are not limited to breast enlargement, headache, diarrhea, nausea, upset stomach, liver function test abnormalities, taste disturbance, and stomach pain.  There is a rare possibility of liver failure that can occur when taking ketoconazole. The patient understands that monitoring of LFTs may be required, especially at baseline. The patient verbalized understanding of the proper use and possible adverse effects of ketoconazole.  All of the patient's questions and concerns were addressed. Tranexamic Acid Counseling:  Patient advised of the small risk of bleeding problems with tranexamic acid. They were also instructed to call if they developed any nausea, vomiting or diarrhea. All of the patient's questions and concerns were addressed. Metronidazole Counseling:  I discussed with the patient the risks of metronidazole including but not limited to seizures, nausea/vomiting, a metallic taste in the mouth, nausea/vomiting and severe allergy. Topical Ketoconazole Counseling: Patient counseled that this medication may cause skin irritation or allergic reactions.  In the event of skin irritation, the patient was advised to reduce the amount of the drug applied or use it less frequently.   The patient verbalized understanding of the proper use and possible adverse effects of ketoconazole.  All of the patient's questions and concerns were addressed. 5-Fu Pregnancy And Lactation Text: This medication is Pregnancy Category X and contraindicated in pregnancy and in women who may become pregnant. It is unknown if this medication is excreted in breast milk. Low Dose Naltrexone Pregnancy And Lactation Text: Naltrexone is pregnancy category C.  There have been no adequate and well-controlled studies in pregnant women.  It should be used in pregnancy only if the potential benefit justifies the potential risk to the fetus.   Limited data indicates that naltrexone is minimally excreted into breastmilk. Xolair Counseling:  Patient informed of potential adverse effects including but not limited to fever, muscle aches, rash and allergic reactions.  The patient verbalized understanding of the proper use and possible adverse effects of Xolair.  All of the patient's questions and concerns were addressed. Protopic Counseling: Patient may experience a mild burning sensation during topical application. Protopic is not approved in children less than 2 years of age. There have been case reports of hematologic and skin malignancies in patients using topical calcineurin inhibitors although causality is questionable. Bactrim Pregnancy And Lactation Text: This medication is Pregnancy Category D and is known to cause fetal risk.  It is also excreted in breast milk. Azelaic Acid Pregnancy And Lactation Text: This medication is considered safe during pregnancy and breast feeding. Niacinamide Counseling: I recommended taking niacin or niacinamide, also know as vitamin B3, twice daily. Recent evidence suggests that taking vitamin B3 (500 mg twice daily) can reduce the risk of actinic keratoses and non-melanoma skin cancers. Side effects of vitamin B3 include flushing and headache. Xolair Pregnancy And Lactation Text: This medication is Pregnancy Category B and is considered safe during pregnancy. This medication is excreted in breast milk. Cyclosporine Pregnancy And Lactation Text: This medication is Pregnancy Category C and it isn't know if it is safe during pregnancy. This medication is excreted in breast milk. Olumiant Counseling: I discussed with the patient the risks of Olumiant therapy including but not limited to upper respiratory tract infections, shingles, cold sores, and nausea. Live vaccines should be avoided.  This medication has been linked to serious infections; higher rate of mortality; malignancy and lymphoproliferative disorders; major adverse cardiovascular events; thrombosis; gastrointestinal perforations; neutropenia; lymphopenia; anemia; liver enzyme elevations; and lipid elevations. Minoxidil Counseling: Minoxidil is a topical medication which can increase blood flow where it is applied. It is uncertain how this medication increases hair growth. Side effects are uncommon and include stinging and allergic reactions. Include Pregnancy/Lactation Warning?: No Terbinafine Counseling: Patient counseling regarding adverse effects of terbinafine including but not limited to headache, diarrhea, rash, upset stomach, liver function test abnormalities, itching, taste/smell disturbance, nausea, abdominal pain, and flatulence.  There is a rare possibility of liver failure that can occur when taking terbinafine.  The patient understands that a baseline LFT and kidney function test may be required. The patient verbalized understanding of the proper use and possible adverse effects of terbinafine.  All of the patient's questions and concerns were addressed. Dutasteride Female Counseling: Dutasteride Counseling:  I discussed with the patient the risks of use of dutasteride including but not limited to decreased libido and sexual dysfunction. Explained the teratogenic nature of the medication and stressed the importance of not getting pregnant during treatment. All of the patient's questions and concerns were addressed. Otezla Pregnancy And Lactation Text: This medication is Pregnancy Category C and it isn't known if it is safe during pregnancy. It is unknown if it is excreted in breast milk. Ivermectin Counseling:  Patient instructed to take medication on an empty stomach with a full glass of water.  Patient informed of potential adverse effects including but not limited to nausea, diarrhea, dizziness, itching, and swelling of the extremities or lymph nodes.  The patient verbalized understanding of the proper use and possible adverse effects of ivermectin.  All of the patient's questions and concerns were addressed. Ilumya Counseling: I discussed with the patient the risks of tildrakizumab including but not limited to immunosuppression, malignancy, posterior leukoencephalopathy syndrome, and serious infections.  The patient understands that monitoring is required including a PPD at baseline and must alert us or the primary physician if symptoms of infection or other concerning signs are noted. Minocycline Counseling: Patient advised regarding possible photosensitivity and discoloration of the teeth, skin, lips, tongue and gums.  Patient instructed to avoid sunlight, if possible.  When exposed to sunlight, patients should wear protective clothing, sunglasses, and sunscreen.  The patient was instructed to call the office immediately if the following severe adverse effects occur:  hearing changes, easy bruising/bleeding, severe headache, or vision changes.  The patient verbalized understanding of the proper use and possible adverse effects of minocycline.  All of the patient's questions and concerns were addressed. Drysol Counseling:  I discussed with the patient the risks of drysol/aluminum chloride including but not limited to skin rash, itching, irritation, burning. Skyrizi Counseling: I discussed with the patient the risks of risankizumab-rzaa including but not limited to immunosuppression, and serious infections.  The patient understands that monitoring is required including a PPD at baseline and must alert us or the primary physician if symptoms of infection or other concerning signs are noted. Dapsone Pregnancy And Lactation Text: This medication is Pregnancy Category C and is not considered safe during pregnancy or breast feeding. Opioid Counseling: I discussed with the patient the potential side effects of opioids including but not limited to addiction, altered mental status, and depression. I stressed avoiding alcohol, benzodiazepines, muscle relaxants and sleep aids unless specifically okayed by a physician. The patient verbalized understanding of the proper use and possible adverse effects of opioids. All of the patient's questions and concerns were addressed. They were instructed to flush the remaining pills down the toilet if they did not need them for pain. Tranexamic Acid Pregnancy And Lactation Text: It is unknown if this medication is safe during pregnancy or breast feeding. Cephalexin Counseling: I counseled the patient regarding use of cephalexin as an antibiotic for prophylactic and/or therapeutic purposes. Cephalexin (commonly prescribed under brand name Keflex) is a cephalosporin antibiotic which is active against numerous classes of bacteria, including most skin bacteria. Side effects may include nausea, diarrhea, gastrointestinal upset, rash, hives, yeast infections, and in rare cases, hepatitis, kidney disease, seizures, fever, confusion, neurologic symptoms, and others. Patients with severe allergies to penicillin medications are cautioned that there is about a 10% incidence of cross-reactivity with cephalosporins. When possible, patients with penicillin allergies should use alternatives to cephalosporins for antibiotic therapy. Acitretin Counseling:  I discussed with the patient the risks of acitretin including but not limited to hair loss, dry lips/skin/eyes, liver damage, hyperlipidemia, depression/suicidal ideation, photosensitivity.  Serious rare side effects can include but are not limited to pancreatitis, pseudotumor cerebri, bony changes, clot formation/stroke/heart attack.  Patient understands that alcohol is contraindicated since it can result in liver toxicity and significantly prolong the elimination of the drug by many years. Protopic Pregnancy And Lactation Text: This medication is Pregnancy Category C. It is unknown if this medication is excreted in breast milk when applied topically. Azathioprine Counseling:  I discussed with the patient the risks of azathioprine including but not limited to myelosuppression, immunosuppression, hepatotoxicity, lymphoma, and infections.  The patient understands that monitoring is required including baseline LFTs, Creatinine, possible TPMP genotyping and weekly CBCs for the first month and then every 2 weeks thereafter.  The patient verbalized understanding of the proper use and possible adverse effects of azathioprine.  All of the patient's questions and concerns were addressed. Terbinafine Pregnancy And Lactation Text: This medication is Pregnancy Category B and is considered safe during pregnancy. It is also excreted in breast milk and breast feeding isn't recommended. Niacinamide Pregnancy And Lactation Text: These medications are considered safe during pregnancy. Topical Sulfur Applications Counseling: Topical Sulfur Counseling: Patient counseled that this medication may cause skin irritation or allergic reactions.  In the event of skin irritation, the patient was advised to reduce the amount of the drug applied or use it less frequently.   The patient verbalized understanding of the proper use and possible adverse effects of topical sulfur application.  All of the patient's questions and concerns were addressed. Azathioprine Pregnancy And Lactation Text: This medication is Pregnancy Category D and isn't considered safe during pregnancy. It is unknown if this medication is excreted in breast milk. Dutasteride Pregnancy And Lactation Text: This medication is absolutely contraindicated in women, especially during pregnancy and breast feeding. Feminization of male fetuses is possible if taking while pregnant. Elidel Counseling: Patient may experience a mild burning sensation during topical application. Elidel is not approved in children less than 2 years of age. There have been case reports of hematologic and skin malignancies in patients using topical calcineurin inhibitors although causality is questionable. Fluconazole Counseling:  Patient counseled regarding adverse effects of fluconazole including but not limited to headache, diarrhea, nausea, upset stomach, liver function test abnormalities, taste disturbance, and stomach pain.  There is a rare possibility of liver failure that can occur when taking fluconazole.  The patient understands that monitoring of LFTs and kidney function test may be required, especially at baseline. The patient verbalized understanding of the proper use and possible adverse effects of fluconazole.  All of the patient's questions and concerns were addressed. Dupixent Counseling: I discussed with the patient the risks of dupilumab including but not limited to eye infection and irritation, cold sores, injection site reactions, worsening of asthma, allergic reactions and increased risk of parasitic infection.  Live vaccines should be avoided while taking dupilumab. Dupilumab will also interact with certain medications such as warfarin and cyclosporine. The patient understands that monitoring is required and they must alert us or the primary physician if symptoms of infection or other concerning signs are noted. Oxybutynin Counseling:  I discussed with the patient the risks of oxybutynin including but not limited to skin rash, drowsiness, dry mouth, difficulty urinating, and blurred vision. Rhofade Pregnancy And Lactation Text: This medication has not been assigned a Pregnancy Risk Category. It is unknown if the medication is excreted in breast milk. Dupixent Pregnancy And Lactation Text: This medication likely crosses the placenta but the risk for the fetus is uncertain. This medication is excreted in breast milk. Acitretin Pregnancy And Lactation Text: This medication is Pregnancy Category X and should not be given to women who are pregnant or may become pregnant in the future. This medication is excreted in breast milk. Erivedge Counseling- I discussed with the patient the risks of Erivedge including but not limited to nausea, vomiting, diarrhea, constipation, weight loss, changes in the sense of taste, decreased appetite, muscle spasms, and hair loss.  The patient verbalized understanding of the proper use and possible adverse effects of Erivedge.  All of the patient's questions and concerns were addressed. Cephalexin Pregnancy And Lactation Text: This medication is Pregnancy Category B and considered safe during pregnancy.  It is also excreted in breast milk but can be used safely for shorter doses. Rhofade Counseling: Rhofade is a topical medication which can decrease superficial blood flow where applied. Side effects are uncommon and include stinging, redness and allergic reactions. Infliximab Counseling:  I discussed with the patient the risks of infliximab including but not limited to myelosuppression, immunosuppression, autoimmune hepatitis, demyelinating diseases, lymphoma, and serious infections.  The patient understands that monitoring is required including a PPD at baseline and must alert us or the primary physician if symptoms of infection or other concerning signs are noted. Benzoyl Peroxide Counseling: Patient counseled that medicine may cause skin irritation and bleach clothing.  In the event of skin irritation, the patient was advised to reduce the amount of the drug applied or use it less frequently.   The patient verbalized understanding of the proper use and possible adverse effects of benzoyl peroxide.  All of the patient's questions and concerns were addressed. Gabapentin Counseling: I discussed with the patient the risks of gabapentin including but not limited to dizziness, somnolence, fatigue and ataxia. Methotrexate Counseling:  Patient counseled regarding adverse effects of methotrexate including but not limited to nausea, vomiting, abnormalities in liver function tests. Patients may develop mouth sores, rash, diarrhea, and abnormalities in blood counts. The patient understands that monitoring is required including LFT's and blood counts.  There is a rare possibility of scarring of the liver and lung problems that can occur when taking methotrexate. Persistent nausea, loss of appetite, pale stools, dark urine, cough, and shortness of breath should be reported immediately. Patient advised to discontinue methotrexate treatment at least three months before attempting to become pregnant.  I discussed the need for folate supplements while taking methotrexate.  These supplements can decrease side effects during methotrexate treatment. The patient verbalized understanding of the proper use and possible adverse effects of methotrexate.  All of the patient's questions and concerns were addressed. Valtrex Counseling: I discussed with the patient the risks of valacyclovir including but not limited to kidney damage, nausea, vomiting and severe allergy.  The patient understands that if the infection seems to be worsening or is not improving, they are to call. Olumiant Pregnancy And Lactation Text: Based on animal studies, Olumiant may cause embryo-fetal harm when administered to pregnant women.  The medication should not be used in pregnancy.  Breastfeeding is not recommended during treatment. Opioid Pregnancy And Lactation Text: These medications can lead to premature delivery and should be avoided during pregnancy. These medications are also present in breast milk in small amounts. Stelara Counseling:  I discussed with the patient the risks of ustekinumab including but not limited to immunosuppression, malignancy, posterior leukoencephalopathy syndrome, and serious infections.  The patient understands that monitoring is required including a PPD at baseline and must alert us or the primary physician if symptoms of infection or other concerning signs are noted. Valtrex Pregnancy And Lactation Text: this medication is Pregnancy Category B and is considered safe during pregnancy. This medication is not directly found in breast milk but it's metabolite acyclovir is present. Quinolones Counseling:  I discussed with the patient the risks of fluoroquinolones including but not limited to GI upset, allergic reaction, drug rash, diarrhea, dizziness, photosensitivity, yeast infections, liver function test abnormalities, tendonitis/tendon rupture. Adbry Counseling: I discussed with the patient the risks of tralokinumab including but not limited to eye infection and irritation, cold sores, injection site reactions, worsening of asthma, allergic reactions and increased risk of parasitic infection.  Live vaccines should be avoided while taking tralokinumab. The patient understands that monitoring is required and they must alert us or the primary physician if symptoms of infection or other concerning signs are noted. Topical Sulfur Applications Pregnancy And Lactation Text: This medication is considered safe during pregnancy and breast feeding secondary to limited systemic absorption. Nsaids Counseling: NSAID Counseling: I discussed with the patient that NSAIDs should be taken with food. Prolonged use of NSAIDs can result in the development of stomach ulcers.  Patient advised to stop taking NSAIDs if abdominal pain occurs.  The patient verbalized understanding of the proper use and possible adverse effects of NSAIDs.  All of the patient's questions and concerns were addressed. Cimetidine Counseling:  I discussed with the patient the risks of Cimetidine including but not limited to gynecomastia, headache, diarrhea, nausea, drowsiness, arrhythmias, pancreatitis, skin rashes, psychosis, bone marrow suppression and kidney toxicity. Cellcept Counseling:  I discussed with the patient the risks of mycophenolate mofetil including but not limited to infection/immunosuppression, GI upset, hypokalemia, hypercholesterolemia, bone marrow suppression, lymphoproliferative disorders, malignancy, GI ulceration/bleed/perforation, colitis, interstitial lung disease, kidney failure, progressive multifocal leukoencephalopathy, and birth defects.  The patient understands that monitoring is required including a baseline creatinine and regular CBC testing. In addition, patient must alert us immediately if symptoms of infection or other concerning signs are noted. Finasteride Male Counseling: Finasteride Counseling:  I discussed with the patient the risks of use of finasteride including but not limited to decreased libido, decreased ejaculate volume, gynecomastia, and depression. Women should not handle medication.  All of the patient's questions and concerns were addressed. Bexarotene Pregnancy And Lactation Text: This medication is Pregnancy Category X and should not be given to women who are pregnant or may become pregnant. This medication should not be used if you are breast feeding. Solaraze Counseling:  I discussed with the patient the risks of Solaraze including but not limited to erythema, scaling, itching, weeping, crusting, and pain. Clindamycin Pregnancy And Lactation Text: This medication can be used in pregnancy if certain situations. Clindamycin is also present in breast milk. Finasteride Female Counseling: Finasteride Counseling:  I discussed with the patient the risks of use of finasteride including but not limited to decreased libido and sexual dysfunction. Explained the teratogenic nature of the medication and stressed the importance of not getting pregnant during treatment. All of the patient's questions and concerns were addressed. Clindamycin Counseling: I counseled the patient regarding use of clindamycin as an antibiotic for prophylactic and/or therapeutic purposes. Clindamycin is active against numerous classes of bacteria, including skin bacteria. Side effects may include nausea, diarrhea, gastrointestinal upset, rash, hives, yeast infections, and in rare cases, colitis. Bexarotene Counseling:  I discussed with the patient the risks of bexarotene including but not limited to hair loss, dry lips/skin/eyes, liver abnormalities, hyperlipidemia, pancreatitis, depression/suicidal ideation, photosensitivity, drug rash/allergic reactions, hypothyroidism, anemia, leukopenia, infection, cataracts, and teratogenicity.  Patient understands that they will need regular blood tests to check lipid profile, liver function tests, white blood cell count, thyroid function tests and pregnancy test if applicable. Arava Counseling:  Patient counseled regarding adverse effects of Arava including but not limited to nausea, vomiting, abnormalities in liver function tests. Patients may develop mouth sores, rash, diarrhea, and abnormalities in blood counts. The patient understands that monitoring is required including LFTs and blood counts.  There is a rare possibility of scarring of the liver and lung problems that can occur when taking methotrexate. Persistent nausea, loss of appetite, pale stools, dark urine, cough, and shortness of breath should be reported immediately. Patient advised to discontinue Arava treatment and consult with a physician prior to attempting conception. The patient will have to undergo a treatment to eliminate Arava from the body prior to conception. Benzoyl Peroxide Pregnancy And Lactation Text: This medication is Pregnancy Category C. It is unknown if benzoyl peroxide is excreted in breast milk. Methotrexate Pregnancy And Lactation Text: This medication is Pregnancy Category X and is known to cause fetal harm. This medication is excreted in breast milk. Propranolol Counseling:  I discussed with the patient the risks of propranolol including but not limited to low heart rate, low blood pressure, low blood sugar, restlessness and increased cold sensitivity. They should call the office if they experience any of these side effects. Rinvoq Counseling: I discussed with the patient the risks of Rinvoq therapy including but not limited to upper respiratory tract infections, shingles, cold sores, bronchitis, nausea, cough, fever, acne, and headache. Live vaccines should be avoided.  This medication has been linked to serious infections; higher rate of mortality; malignancy and lymphoproliferative disorders; major adverse cardiovascular events; thrombosis; thrombocytopenia, anemia, and neutropenia; lipid elevations; liver enzyme elevations; and gastrointestinal perforations. Enbrel Counseling:  I discussed with the patient the risks of etanercept including but not limited to myelosuppression, immunosuppression, autoimmune hepatitis, demyelinating diseases, lymphoma, and infections.  The patient understands that monitoring is required including a PPD at baseline and must alert us or the primary physician if symptoms of infection or other concerning signs are noted. Mirvaso Counseling: Mirvaso is a topical medication which can decrease superficial blood flow where applied. Side effects are uncommon and include stinging, redness and allergic reactions. Eucrisa Counseling: Patient may experience a mild burning sensation during topical application. Eucrisa is not approved in children less than 3 months of age. Glycopyrrolate Counseling:  I discussed with the patient the risks of glycopyrrolate including but not limited to skin rash, drowsiness, dry mouth, difficulty urinating, and blurred vision. Griseofulvin Counseling:  I discussed with the patient the risks of griseofulvin including but not limited to photosensitivity, cytopenia, liver damage, nausea/vomiting and severe allergy.  The patient understands that this medication is best absorbed when taken with a fatty meal (e.g., ice cream or french fries). Doxycycline Counseling:  Patient counseled regarding possible photosensitivity and increased risk for sunburn.  Patient instructed to avoid sunlight, if possible.  When exposed to sunlight, patients should wear protective clothing, sunglasses, and sunscreen.  The patient was instructed to call the office immediately if the following severe adverse effects occur:  hearing changes, easy bruising/bleeding, severe headache, or vision changes.  The patient verbalized understanding of the proper use and possible adverse effects of doxycycline.  All of the patient's questions and concerns were addressed. Isotretinoin Counseling: Patient should get monthly blood tests, not donate blood, not drive at night if vision affected, not share medication, and not undergo elective surgery for 6 months after tx completed. Side effects reviewed, pt to contact office should one occur. Wartpeel Counseling:  I discussed with the patient the risks of Wartpeel including but not limited to erythema, scaling, itching, weeping, crusting, and pain. Solaraze Pregnancy And Lactation Text: This medication is Pregnancy Category B and is considered safe. There is some data to suggest avoiding during the third trimester. It is unknown if this medication is excreted in breast milk. Adbry Pregnancy And Lactation Text: It is unknown if this medication will adversely affect pregnancy or breast feeding. Nsaids Pregnancy And Lactation Text: These medications are considered safe up to 30 weeks gestation. It is excreted in breast milk. Carac Counseling:  I discussed with the patient the risks of Carac including but not limited to erythema, scaling, itching, weeping, crusting, and pain. Prednisone Counseling:  I discussed with the patient the risks of prolonged use of prednisone including but not limited to weight gain, insomnia, osteoporosis, mood changes, diabetes, susceptibility to infection, glaucoma and high blood pressure.  In cases where prednisone use is prolonged, patients should be monitored with blood pressure checks, serum glucose levels and an eye exam.  Additionally, the patient may need to be placed on GI prophylaxis, PCP prophylaxis, and calcium and vitamin D supplementation and/or a bisphosphonate.  The patient verbalized understanding of the proper use and the possible adverse effects of prednisone.  All of the patient's questions and concerns were addressed. Olanzapine Counseling- I discussed with the patient the common side effects of olanzapine including but are not limited to: lack of energy, dry mouth, increased appetite, sleepiness, tremor, constipation, dizziness, changes in behavior, or restlessness.  Explained that teenagers are more likely to experience headaches, abdominal pain, pain in the arms or legs, tiredness, and sleepiness.  Serious side effects include but are not limited: increased risk of death in elderly patients who are confused, have memory loss, or dementia-related psychosis; hyperglycemia; increased cholesterol and triglycerides; and weight gain. Bimzelx Counseling:  I discussed with the patient the risks of Bimzelx including but not limited to depression, immunosuppression, allergic reactions and infections.  The patient understands that monitoring is required including a PPD at baseline and must alert us or the primary physician if symptoms of infection or other concerning signs are noted. Rinvoq Pregnancy And Lactation Text: Based on animal studies, Rinvoq may cause embryo-fetal harm when administered to pregnant women.  The medication should not be used in pregnancy.  Breastfeeding is not recommended during treatment and for 6 days after the last dose. Cyclophosphamide Counseling:  I discussed with the patient the risks of cyclophosphamide including but not limited to hair loss, hormonal abnormalities, decreased fertility, abdominal pain, diarrhea, nausea and vomiting, bone marrow suppression and infection. The patient understands that monitoring is required while taking this medication. Finasteride Pregnancy And Lactation Text: This medication is absolutely contraindicated during pregnancy. It is unknown if it is excreted in breast milk. Rituxan Counseling:  I discussed with the patient the risks of Rituxan infusions. Side effects can include infusion reactions, severe drug rashes including mucocutaneous reactions, reactivation of latent hepatitis and other infections and rarely progressive multifocal leukoencephalopathy.  All of the patient's questions and concerns were addressed. Propranolol Pregnancy And Lactation Text: This medication is Pregnancy Category C and it isn't known if it is safe during pregnancy. It is excreted in breast milk. Libtayo Counseling- I discussed with the patient the risks of Libtayo including but not limited to nausea, vomiting, diarrhea, and bone or muscle pain.  The patient verbalized understanding of the proper use and possible adverse effects of Libtayo.  All of the patient's questions and concerns were addressed. Doxepin Counseling:  Patient advised that the medication is sedating and not to drive a car after taking this medication. Patient informed of potential adverse effects including but not limited to dry mouth, urinary retention, and blurry vision.  The patient verbalized understanding of the proper use and possible adverse effects of doxepin.  All of the patient's questions and concerns were addressed. Rituxan Pregnancy And Lactation Text: This medication is Pregnancy Category C and it isn't know if it is safe during pregnancy. It is unknown if this medication is excreted in breast milk but similar antibodies are known to be excreted. Isotretinoin Pregnancy And Lactation Text: This medication is Pregnancy Category X and is considered extremely dangerous during pregnancy. It is unknown if it is excreted in breast milk. SSKI Counseling:  I discussed with the patient the risks of SSKI including but not limited to thyroid abnormalities, metallic taste, GI upset, fever, headache, acne, arthralgias, paraesthesias, lymphadenopathy, easy bleeding, arrhythmias, and allergic reaction. Doxycycline Pregnancy And Lactation Text: This medication is Pregnancy Category D and not consider safe during pregnancy. It is also excreted in breast milk but is considered safe for shorter treatment courses. Libtayo Pregnancy And Lactation Text: This medication is contraindicated in pregnancy and when breast feeding. Calcipotriene Counseling:  I discussed with the patient the risks of calcipotriene including but not limited to erythema, scaling, itching, and irritation. Topical Retinoid counseling:  Patient advised to apply a pea-sized amount only at bedtime and wait 30 minutes after washing their face before applying.  If too drying, patient may add a non-comedogenic moisturizer. The patient verbalized understanding of the proper use and possible adverse effects of retinoids.  All of the patient's questions and concerns were addressed. Tazorac Counseling:  Patient advised that medication is irritating and drying.  Patient may need to apply sparingly and wash off after an hour before eventually leaving it on overnight.  The patient verbalized understanding of the proper use and possible adverse effects of tazorac.  All of the patient's questions and concerns were addressed. Taltz Counseling: I discussed with the patient the risks of ixekizumab including but not limited to immunosuppression, serious infections, worsening of inflammatory bowel disease and drug reactions.  The patient understands that monitoring is required including a PPD at baseline and must alert us or the primary physician if symptoms of infection or other concerning signs are noted. Glycopyrrolate Pregnancy And Lactation Text: This medication is Pregnancy Category B and is considered safe during pregnancy. It is unknown if it is excreted breast milk. Azithromycin Counseling:  I discussed with the patient the risks of azithromycin including but not limited to GI upset, allergic reaction, drug rash, diarrhea, and yeast infections. Sotyktu Pregnancy And Lactation Text: There is insufficient data to evaluate whether or not Sotyktu is safe to use during pregnancy.   It is not known if Sotyktu passes into breast milk and whether or not it is safe to use when breastfeeding.   Sotyktu Counseling:  I discussed the most common side effects of Sotyktu including: common cold, sore throat, sinus infections, cold sores, canker sores, folliculitis, and acne.  I also discussed more serious side effects of Sotyktu including but not limited to: serious allergic reactions; increased risk for infections such as TB; cancers such as lymphomas; rhabdomyolysis and elevated CPK; and elevated triglycerides and liver enzymes.  Opzelura Counseling:  I discussed with the patient the risks of Opzelura including but not limited to nasopharngitis, bronchitis, ear infection, eosinophila, hives, diarrhea, folliculitis, tonsillitis, and rhinorrhea.  Taken orally, this medication has been linked to serious infections; higher rate of mortality; malignancy and lymphoproliferative disorders; major adverse cardiovascular events; thrombosis; thrombocytopenia, anemia, and neutropenia; and lipid elevations. Olanzapine Pregnancy And Lactation Text: This medication is pregnancy category C.   There are no adequate and well controlled trials with olanzapine in pregnant females.  Olanzapine should be used during pregnancy only if the potential benefit justifies the potential risk to the fetus.   In a study in lactating healthy women, olanzapine was excreted in breast milk.  It is recommended that women taking olanzapine should not breast feed. Winlevi Counseling:  I discussed with the patient the risks of topical clascoterone including but not limited to erythema, scaling, itching, and stinging. Patient voiced their understanding. Bimzelx Pregnancy And Lactation Text: This medication crosses the placenta and the safety is uncertain during pregnancy. It is unknown if this medication is present in breast milk. Rifampin Counseling: I discussed with the patient the risks of rifampin including but not limited to liver damage, kidney damage, red-orange body fluids, nausea/vomiting and severe allergy. Griseofulvin Pregnancy And Lactation Text: This medication is Pregnancy Category X and is known to cause serious birth defects. It is unknown if this medication is excreted in breast milk but breast feeding should be avoided. Clofazimine Counseling:  I discussed with the patient the risks of clofazimine including but not limited to skin and eye pigmentation, liver damage, nausea/vomiting, gastrointestinal bleeding and allergy. Humira Counseling:  I discussed with the patient the risks of adalimumab including but not limited to myelosuppression, immunosuppression, autoimmune hepatitis, demyelinating diseases, lymphoma, and serious infections.  The patient understands that monitoring is required including a PPD at baseline and must alert us or the primary physician if symptoms of infection or other concerning signs are noted. Birth Control Pills Counseling: Birth Control Pill Counseling: I discussed with the patient the potential side effects of OCPs including but not limited to increased risk of stroke, heart attack, thrombophlebitis, deep venous thrombosis, hepatic adenomas, breast changes, GI upset, headaches, and depression.  The patient verbalized understanding of the proper use and possible adverse effects of OCPs. All of the patient's questions and concerns were addressed.

## 2024-06-17 NOTE — ED PROVIDER NOTE - NS ED MD DISPO DIVISION
Patient notified of results.  Deanna Feng LPN  
[facility] for images to be pushed sent  
[facility]

## 2024-07-15 ENCOUNTER — EMERGENCY (EMERGENCY)
Facility: HOSPITAL | Age: 89
LOS: 1 days | Discharge: HOME | End: 2024-07-15
Attending: EMERGENCY MEDICINE | Admitting: EMERGENCY MEDICINE
Payer: MEDICARE

## 2024-07-15 VITALS
DIASTOLIC BLOOD PRESSURE: 61 MMHG | WEIGHT: 130.07 LBS | HEART RATE: 95 BPM | RESPIRATION RATE: 18 BRPM | SYSTOLIC BLOOD PRESSURE: 114 MMHG | OXYGEN SATURATION: 97 % | TEMPERATURE: 98 F

## 2024-07-15 DIAGNOSIS — Z96.652 PRESENCE OF LEFT ARTIFICIAL KNEE JOINT: Chronic | ICD-10-CM

## 2024-07-15 DIAGNOSIS — Z96.641 PRESENCE OF RIGHT ARTIFICIAL HIP JOINT: Chronic | ICD-10-CM

## 2024-07-15 PROCEDURE — 99284 EMERGENCY DEPT VISIT MOD MDM: CPT

## 2024-07-16 VITALS
SYSTOLIC BLOOD PRESSURE: 128 MMHG | RESPIRATION RATE: 19 BRPM | HEART RATE: 76 BPM | OXYGEN SATURATION: 98 % | TEMPERATURE: 99 F | DIASTOLIC BLOOD PRESSURE: 79 MMHG

## 2024-07-16 PROCEDURE — 70450 CT HEAD/BRAIN W/O DYE: CPT | Mod: 26,MC

## 2024-07-16 PROCEDURE — 72125 CT NECK SPINE W/O DYE: CPT | Mod: 26,MC

## 2024-09-09 NOTE — CONSULT NOTE ADULT - PROVIDER SPECIALTY LIST ADULT
Internal Medicine
Alert-The patient is alert, awake and responds to voice. The patient is oriented to time, place, and person. The triage nurse is able to obtain subjective information.

## 2025-03-03 ENCOUNTER — INPATIENT (INPATIENT)
Facility: HOSPITAL | Age: 89
LOS: 6 days | Discharge: HOME HEALTH | DRG: 998 | End: 2025-03-10
Attending: STUDENT IN AN ORGANIZED HEALTH CARE EDUCATION/TRAINING PROGRAM | Admitting: STUDENT IN AN ORGANIZED HEALTH CARE EDUCATION/TRAINING PROGRAM
Payer: MEDICARE

## 2025-03-03 VITALS
HEIGHT: 62 IN | HEART RATE: 63 BPM | SYSTOLIC BLOOD PRESSURE: 149 MMHG | WEIGHT: 134.92 LBS | DIASTOLIC BLOOD PRESSURE: 89 MMHG | RESPIRATION RATE: 20 BRPM | OXYGEN SATURATION: 95 %

## 2025-03-03 DIAGNOSIS — R29.6 REPEATED FALLS: ICD-10-CM

## 2025-03-03 DIAGNOSIS — M43.9 DEFORMING DORSOPATHY, UNSPECIFIED: ICD-10-CM

## 2025-03-03 DIAGNOSIS — Z29.9 ENCOUNTER FOR PROPHYLACTIC MEASURES, UNSPECIFIED: ICD-10-CM

## 2025-03-03 DIAGNOSIS — Z96.641 PRESENCE OF RIGHT ARTIFICIAL HIP JOINT: Chronic | ICD-10-CM

## 2025-03-03 DIAGNOSIS — I10 ESSENTIAL (PRIMARY) HYPERTENSION: ICD-10-CM

## 2025-03-03 DIAGNOSIS — G30.9 ALZHEIMER'S DISEASE, UNSPECIFIED: ICD-10-CM

## 2025-03-03 DIAGNOSIS — Z96.652 PRESENCE OF LEFT ARTIFICIAL KNEE JOINT: Chronic | ICD-10-CM
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
## 2025-03-03 DIAGNOSIS — N39.0 URINARY TRACT INFECTION, SITE NOT SPECIFIED: ICD-10-CM

## 2025-03-03 LAB
ADD ON TEST-SPECIMEN IN LAB: SIGNIFICANT CHANGE UP
ALBUMIN SERPL ELPH-MCNC: 4.1 G/DL — SIGNIFICANT CHANGE UP (ref 3.3–5)
ALP SERPL-CCNC: 116 U/L — SIGNIFICANT CHANGE UP (ref 40–120)
ALT FLD-CCNC: 20 U/L — SIGNIFICANT CHANGE UP (ref 10–45)
ANION GAP SERPL CALC-SCNC: 12 MMOL/L — SIGNIFICANT CHANGE UP (ref 5–17)
APPEARANCE UR: CLEAR — SIGNIFICANT CHANGE UP
APTT BLD: 30.4 SEC — SIGNIFICANT CHANGE UP (ref 24.5–35.6)
AST SERPL-CCNC: 13 U/L — SIGNIFICANT CHANGE UP (ref 10–40)
BACTERIA # UR AUTO: ABNORMAL /HPF
BASOPHILS # BLD AUTO: 0.02 K/UL — SIGNIFICANT CHANGE UP (ref 0–0.2)
BASOPHILS NFR BLD AUTO: 0.3 % — SIGNIFICANT CHANGE UP (ref 0–2)
BILIRUB SERPL-MCNC: 0.5 MG/DL — SIGNIFICANT CHANGE UP (ref 0.2–1.2)
BILIRUB UR-MCNC: NEGATIVE — SIGNIFICANT CHANGE UP
BUN SERPL-MCNC: 16 MG/DL — SIGNIFICANT CHANGE UP (ref 7–23)
CALCIUM SERPL-MCNC: 9.6 MG/DL — SIGNIFICANT CHANGE UP (ref 8.4–10.5)
CAST: 0 /LPF — SIGNIFICANT CHANGE UP (ref 0–4)
CHLORIDE SERPL-SCNC: 104 MMOL/L — SIGNIFICANT CHANGE UP (ref 96–108)
CK MB CFR SERPL CALC: 4.5 NG/ML — HIGH (ref 0–3.8)
CO2 SERPL-SCNC: 23 MMOL/L — SIGNIFICANT CHANGE UP (ref 22–31)
COLOR SPEC: YELLOW — SIGNIFICANT CHANGE UP
CREAT SERPL-MCNC: 0.46 MG/DL — LOW (ref 0.5–1.3)
DIFF PNL FLD: ABNORMAL
EGFR: 93 ML/MIN/1.73M2 — SIGNIFICANT CHANGE UP
EGFR: 93 ML/MIN/1.73M2 — SIGNIFICANT CHANGE UP
EOSINOPHIL # BLD AUTO: 0.03 K/UL — SIGNIFICANT CHANGE UP (ref 0–0.5)
EOSINOPHIL NFR BLD AUTO: 0.4 % — SIGNIFICANT CHANGE UP (ref 0–6)
GAS PNL BLDV: SIGNIFICANT CHANGE UP
GLUCOSE SERPL-MCNC: 110 MG/DL — HIGH (ref 70–99)
GLUCOSE UR QL: NEGATIVE MG/DL — SIGNIFICANT CHANGE UP
HCT VFR BLD CALC: 41.4 % — SIGNIFICANT CHANGE UP (ref 34.5–45)
HGB BLD-MCNC: 13.3 G/DL — SIGNIFICANT CHANGE UP (ref 11.5–15.5)
IMM GRANULOCYTES NFR BLD AUTO: 0.6 % — SIGNIFICANT CHANGE UP (ref 0–0.9)
INR BLD: 1.06 RATIO — SIGNIFICANT CHANGE UP (ref 0.85–1.16)
KETONES UR-MCNC: NEGATIVE MG/DL — SIGNIFICANT CHANGE UP
LEUKOCYTE ESTERASE UR-ACNC: ABNORMAL
LYMPHOCYTES # BLD AUTO: 0.86 K/UL — LOW (ref 1–3.3)
LYMPHOCYTES # BLD AUTO: 12.7 % — LOW (ref 13–44)
MAGNESIUM SERPL-MCNC: 2 MG/DL — SIGNIFICANT CHANGE UP (ref 1.6–2.6)
MCHC RBC-ENTMCNC: 30.9 PG — SIGNIFICANT CHANGE UP (ref 27–34)
MCHC RBC-ENTMCNC: 32.1 G/DL — SIGNIFICANT CHANGE UP (ref 32–36)
MCV RBC AUTO: 96.3 FL — SIGNIFICANT CHANGE UP (ref 80–100)
MONOCYTES # BLD AUTO: 0.43 K/UL — SIGNIFICANT CHANGE UP (ref 0–0.9)
MONOCYTES NFR BLD AUTO: 6.3 % — SIGNIFICANT CHANGE UP (ref 2–14)
NEUTROPHILS # BLD AUTO: 5.41 K/UL — SIGNIFICANT CHANGE UP (ref 1.8–7.4)
NEUTROPHILS NFR BLD AUTO: 79.7 % — HIGH (ref 43–77)
NITRITE UR-MCNC: POSITIVE
NRBC BLD AUTO-RTO: 0 /100 WBCS — SIGNIFICANT CHANGE UP (ref 0–0)
PH UR: 6.5 — SIGNIFICANT CHANGE UP (ref 5–8)
PLATELET # BLD AUTO: 181 K/UL — SIGNIFICANT CHANGE UP (ref 150–400)
POTASSIUM SERPL-MCNC: 4.4 MMOL/L — SIGNIFICANT CHANGE UP (ref 3.5–5.3)
POTASSIUM SERPL-SCNC: 4.4 MMOL/L — SIGNIFICANT CHANGE UP (ref 3.5–5.3)
PROT SERPL-MCNC: 7.9 G/DL — SIGNIFICANT CHANGE UP (ref 6–8.3)
PROT UR-MCNC: NEGATIVE MG/DL — SIGNIFICANT CHANGE UP
PROTHROM AB SERPL-ACNC: 12.2 SEC — SIGNIFICANT CHANGE UP (ref 9.9–13.4)
RBC # BLD: 4.3 M/UL — SIGNIFICANT CHANGE UP (ref 3.8–5.2)
RBC # FLD: 13 % — SIGNIFICANT CHANGE UP (ref 10.3–14.5)
RBC CASTS # UR COMP ASSIST: 1 /HPF — SIGNIFICANT CHANGE UP (ref 0–4)
SODIUM SERPL-SCNC: 139 MMOL/L — SIGNIFICANT CHANGE UP (ref 135–145)
SP GR SPEC: 1.02 — SIGNIFICANT CHANGE UP (ref 1–1.03)
SQUAMOUS # UR AUTO: 1 /HPF — SIGNIFICANT CHANGE UP (ref 0–5)
TROPONIN T, HIGH SENSITIVITY RESULT: 12 NG/L — SIGNIFICANT CHANGE UP (ref 0–51)
UROBILINOGEN FLD QL: 0.2 MG/DL — SIGNIFICANT CHANGE UP (ref 0.2–1)
WBC # BLD: 6.79 K/UL — SIGNIFICANT CHANGE UP (ref 3.8–10.5)
WBC # FLD AUTO: 6.79 K/UL — SIGNIFICANT CHANGE UP (ref 3.8–10.5)
WBC UR QL: 10 /HPF — HIGH (ref 0–5)

## 2025-03-03 PROCEDURE — 73564 X-RAY EXAM KNEE 4 OR MORE: CPT | Mod: 26,RT

## 2025-03-03 PROCEDURE — 72125 CT NECK SPINE W/O DYE: CPT | Mod: 26

## 2025-03-03 PROCEDURE — 73502 X-RAY EXAM HIP UNI 2-3 VIEWS: CPT | Mod: 26,RT

## 2025-03-03 PROCEDURE — 71250 CT THORAX DX C-: CPT | Mod: 26

## 2025-03-03 PROCEDURE — 99285 EMERGENCY DEPT VISIT HI MDM: CPT | Mod: GC

## 2025-03-03 PROCEDURE — 72100 X-RAY EXAM L-S SPINE 2/3 VWS: CPT | Mod: 26

## 2025-03-03 PROCEDURE — 73552 X-RAY EXAM OF FEMUR 2/>: CPT | Mod: 26,RT

## 2025-03-03 PROCEDURE — 74176 CT ABD & PELVIS W/O CONTRAST: CPT | Mod: 26

## 2025-03-03 PROCEDURE — 70450 CT HEAD/BRAIN W/O DYE: CPT | Mod: 26

## 2025-03-03 PROCEDURE — 99223 1ST HOSP IP/OBS HIGH 75: CPT

## 2025-03-03 PROCEDURE — 73030 X-RAY EXAM OF SHOULDER: CPT | Mod: 26,50

## 2025-03-03 RX ORDER — CEFTRIAXONE 500 MG/1
1000 INJECTION, POWDER, FOR SOLUTION INTRAMUSCULAR; INTRAVENOUS EVERY 24 HOURS
Refills: 0 | Status: DISCONTINUED | OUTPATIENT
Start: 2025-03-03 | End: 2025-03-05

## 2025-03-03 RX ORDER — CITALOPRAM 20 MG/1
10 TABLET ORAL DAILY
Refills: 0 | Status: DISCONTINUED | OUTPATIENT
Start: 2025-03-03 | End: 2025-03-10

## 2025-03-03 RX ORDER — METOPROLOL SUCCINATE 50 MG/1
2.5 TABLET, EXTENDED RELEASE ORAL ONCE
Refills: 0 | Status: COMPLETED | OUTPATIENT
Start: 2025-03-03 | End: 2025-03-03

## 2025-03-03 RX ORDER — IBUPROFEN 200 MG
400 TABLET ORAL EVERY 6 HOURS
Refills: 0 | Status: DISCONTINUED | OUTPATIENT
Start: 2025-03-03 | End: 2025-03-03

## 2025-03-03 RX ORDER — METOPROLOL SUCCINATE 50 MG/1
2.5 TABLET, EXTENDED RELEASE ORAL EVERY 6 HOURS
Refills: 0 | Status: DISCONTINUED | OUTPATIENT
Start: 2025-03-03 | End: 2025-03-03

## 2025-03-03 RX ORDER — CYANOCOBALAMIN 1000 UG/ML
1000 INJECTION INTRAMUSCULAR; SUBCUTANEOUS DAILY
Refills: 0 | Status: DISCONTINUED | OUTPATIENT
Start: 2025-03-03 | End: 2025-03-10

## 2025-03-03 RX ORDER — SODIUM CHLORIDE 9 G/1000ML
1000 INJECTION, SOLUTION INTRAVENOUS ONCE
Refills: 0 | Status: COMPLETED | OUTPATIENT
Start: 2025-03-03 | End: 2025-03-03

## 2025-03-03 RX ORDER — METOPROLOL SUCCINATE 50 MG/1
25 TABLET, EXTENDED RELEASE ORAL DAILY
Refills: 0 | Status: DISCONTINUED | OUTPATIENT
Start: 2025-03-03 | End: 2025-03-03

## 2025-03-03 RX ORDER — LIDOCAINE HCL/EPINEPHRINE/PF 1 %-1:200K
8 AMPUL (ML) INJECTION ONCE
Refills: 0 | Status: COMPLETED | OUTPATIENT
Start: 2025-03-03 | End: 2025-03-03

## 2025-03-03 RX ORDER — CEFTRIAXONE 500 MG/1
1000 INJECTION, POWDER, FOR SOLUTION INTRAMUSCULAR; INTRAVENOUS ONCE
Refills: 0 | Status: COMPLETED | OUTPATIENT
Start: 2025-03-03 | End: 2025-03-03

## 2025-03-03 RX ORDER — METOPROLOL SUCCINATE 50 MG/1
25 TABLET, EXTENDED RELEASE ORAL DAILY
Refills: 0 | Status: DISCONTINUED | OUTPATIENT
Start: 2025-03-03 | End: 2025-03-10

## 2025-03-03 RX ORDER — KETOROLAC TROMETHAMINE 30 MG/ML
15 INJECTION, SOLUTION INTRAMUSCULAR; INTRAVENOUS ONCE
Refills: 0 | Status: DISCONTINUED | OUTPATIENT
Start: 2025-03-03 | End: 2025-03-09

## 2025-03-03 RX ORDER — KETOROLAC TROMETHAMINE 30 MG/ML
15 INJECTION, SOLUTION INTRAMUSCULAR; INTRAVENOUS EVERY 6 HOURS
Refills: 0 | Status: DISCONTINUED | OUTPATIENT
Start: 2025-03-03 | End: 2025-03-03

## 2025-03-03 RX ORDER — ACETAMINOPHEN 500 MG/5ML
650 LIQUID (ML) ORAL EVERY 6 HOURS
Refills: 0 | Status: DISCONTINUED | OUTPATIENT
Start: 2025-03-03 | End: 2025-03-10

## 2025-03-03 RX ORDER — CLOSTRIDIUM TETANI TOXOID ANTIGEN (FORMALDEHYDE INACTIVATED), CORYNEBACTERIUM DIPHTHERIAE TOXOID ANTIGEN (FORMALDEHYDE INACTIVATED), BORDETELLA PERTUSSIS TOXOID ANTIGEN (GLUTARALDEHYDE INACTIVATED), BORDETELLA PERTUSSIS FILAMENTOUS HEMAGGLUTININ ANTIGEN (FORMALDEHYDE INACTIVATED), BORDETELLA PERTUSSIS PERTACTIN ANTIGEN, AND BORDETELLA PERTUSSIS FIMBRIAE 2/3 ANTIGEN 5; 2; 2.5; 5; 3; 5 [LF]/.5ML; [LF]/.5ML; UG/.5ML; UG/.5ML; UG/.5ML; UG/.5ML
0.5 INJECTION, SUSPENSION INTRAMUSCULAR ONCE
Refills: 0 | Status: COMPLETED | OUTPATIENT
Start: 2025-03-03 | End: 2025-03-03

## 2025-03-03 RX ORDER — ENOXAPARIN SODIUM 100 MG/ML
40 INJECTION SUBCUTANEOUS EVERY 24 HOURS
Refills: 0 | Status: DISCONTINUED | OUTPATIENT
Start: 2025-03-03 | End: 2025-03-10

## 2025-03-03 RX ADMIN — Medication 50 MILLILITER(S): at 22:53

## 2025-03-03 RX ADMIN — METOPROLOL SUCCINATE 2.5 MILLIGRAM(S): 50 TABLET, EXTENDED RELEASE ORAL at 16:13

## 2025-03-03 RX ADMIN — Medication 8 MILLILITER(S): at 09:45

## 2025-03-03 RX ADMIN — ENOXAPARIN SODIUM 40 MILLIGRAM(S): 100 INJECTION SUBCUTANEOUS at 16:12

## 2025-03-03 RX ADMIN — CEFTRIAXONE 100 MILLIGRAM(S): 500 INJECTION, POWDER, FOR SOLUTION INTRAMUSCULAR; INTRAVENOUS at 11:10

## 2025-03-03 RX ADMIN — CLOSTRIDIUM TETANI TOXOID ANTIGEN (FORMALDEHYDE INACTIVATED), CORYNEBACTERIUM DIPHTHERIAE TOXOID ANTIGEN (FORMALDEHYDE INACTIVATED), BORDETELLA PERTUSSIS TOXOID ANTIGEN (GLUTARALDEHYDE INACTIVATED), BORDETELLA PERTUSSIS FILAMENTOUS HEMAGGLUTININ ANTIGEN (FORMALDEHYDE INACTIVATED), BORDETELLA PERTUSSIS PERTACTIN ANTIGEN, AND BORDETELLA PERTUSSIS FIMBRIAE 2/3 ANTIGEN 0.5 MILLILITER(S): 5; 2; 2.5; 5; 3; 5 INJECTION, SUSPENSION INTRAMUSCULAR at 09:39

## 2025-03-03 RX ADMIN — SODIUM CHLORIDE 1000 MILLILITER(S): 9 INJECTION, SOLUTION INTRAVENOUS at 11:44

## 2025-03-03 RX ADMIN — Medication 5 MILLIGRAM(S): at 14:09

## 2025-03-03 RX ADMIN — Medication 50 MILLILITER(S): at 16:15

## 2025-03-03 NOTE — ED PROVIDER NOTE - PRINCIPAL DIAGNOSIS
States that the black runs from the inside corner of her eye and covers about 1/4 of her eyelid.  Denies drainage, pain/tenderness, redness to the white of her eye, vision disturbance, injury, does not have pets that sleep with her.  Just wondering if she should be concerned?  Anything she should be watching for?  Any idea what may have caused it?  Please advise.   Fall Scalp laceration

## 2025-03-03 NOTE — H&P ADULT - NSHPOUTPATIENTPROVIDERS_GEN_ALL_CORE
As listed on transfer paperwork:  Psychiatrist - Akhil Huynh ([phone]) / Charles Faye ([phone])  Podiatrist - Reymundo Chan ([phone]) As listed on EMS transfer paperwork:  Psychiatrist - Akhil Huynh ([phone]) / Charles Faye ([phone])  Podiatrist - Reymundo Chan ([phone])

## 2025-03-03 NOTE — ED PROVIDER NOTE - PROGRESS NOTE DETAILS
paged ortho for possible compression fractures. treated urine with ceftriaxone.   will plan for admission for falls, UTI, AMS MO - spoke with ortho spine who is aware of the pt

## 2025-03-03 NOTE — ED ADULT NURSE NOTE - OBJECTIVE STATEMENT
Pt is an 89y F A&O 0 PMHx HTN, dementia, depression as per EMS presenting to the ED from nursing home found down after a fall. Pt was last seen at 8 PM last night and was found on the floor this morning. Pt has a left forehead abrasion. R leg appears shorter than left leg on assessment. No abdominal tenderness. Pt breathing spontaneously and unlabored. Pt is an 89y F A&O 0 PMHx HTN, dementia, depression as per EMS presenting to the ED from nursing home found down after a fall. Pt was last seen at 8 PM last night and was found on the floor this morning. Pt placed in collar by EMS.  Pt has a left forehead abrasion. R leg appears shorter than left leg on assessment. No abdominal tenderness. Pt breathing spontaneously and unlabored.

## 2025-03-03 NOTE — H&P ADULT - PROBLEM SELECTOR PLAN 2
- As noted above, CT findings of compression deformites of T11, L1, L4, L5  - Pending XR LS for further evaluation  - Orthopedics following

## 2025-03-03 NOTE — H&P ADULT - PROBLEM SELECTOR PLAN 5
- Takes metop succinate 25 mg daily   - Given current inability to take PO, placed on metop 2.5 mg IV q6H with holding parameters

## 2025-03-03 NOTE — ED PROVIDER NOTE - ATTENDING CONTRIBUTION TO CARE
89F hx dementia baseline oriented x1 pw found down  on exam right hip short   not moving bl shoulders  bluish feet but 1+ dp pulses bl  abd soft nt  oriented x1  I read ekg as nsr rate 72, no st elevation or depression, qtc 477, narrow qrs.   plan for rule out rhabdo, acs, hip fx, brain bleed

## 2025-03-03 NOTE — ED ADULT NURSE NOTE - CHIEF COMPLAINT QUOTE
unwitnessed fall, found on floor by staff at [facility], +L forehead bleeding hematoma, +baseline confusion, +ccollar by EMS, head dressing by EMS

## 2025-03-03 NOTE — H&P ADULT - NSHPSOCIALHISTORY_GEN_ALL_CORE
Per facility, pt mostly bed bound, incontinent of bowel/bladder  Per facility on "regular diet, soft to chew texture, regular consistency"  Takes PO medications via crush meds/open capsules mixed in food

## 2025-03-03 NOTE — ED ADULT NURSE NOTE - NSSEPSISSUSPECTED_ED_A_ED
Dr. Mcclelland was OK with ENT referral.  Patient notified. Gave number for referral as well.     Rosario GALLEGO RN     No

## 2025-03-03 NOTE — ED PROVIDER NOTE - PHYSICAL EXAMINATION
Physical Exam:  General: minimally verbal  Skin: abrasion and 2cm laceration to the L forehead  Eyes: EOMI, Conjunctiva and sclera clear. Pupils equal and reactive  Neck: No JVD  Lungs: No respiratory distress, bilateral breath sounds  Heart: Normal peripheral perfusion  Abdomen: Soft, nontender, nondistended, no CVA tenderness  Extremities: 2+ peripheral pulses, no edema, pt has pain with ROM R hip and R shoulder   Psych: AAO X1  Neurologic: oriented to name, moves all 4 ex, no facial asymmetry

## 2025-03-03 NOTE — CONSULT NOTE ADULT - SUBJECTIVE AND OBJECTIVE BOX
Patient is a 89y Female who presents sp unwitnessed fall at rehab, patient has a scalp lac and found to have multiple age indeterminate VCFs (T11, L1, 4, 5) and new L3 VCF on CT imaging. Denies +HS, unsure LOC. Patient with dementia and difficulty following commands. Urinary incont at baseline. Denies pain/injury elsewhere. Denies numbness/tingling/paresthesias/weakness. Denies fevers/chills. No other complaints at this time.    HEALTH ISSUES - PROBLEM Dx:  Unwitnessed fall    Alzheimer disease    HTN (hypertension)    UTI (urinary tract infection)    Prophylactic measure    Compression deformity of vertebra            MEDICATIONS  (STANDING):  ascorbic acid 500 milliGRAM(s) Oral daily  cefTRIAXone   IVPB 1000 milliGRAM(s) IV Intermittent every 24 hours  citalopram 10 milliGRAM(s) Oral daily  cyanocobalamin 1000 MICROGram(s) Oral daily  enoxaparin Injectable 40 milliGRAM(s) SubCutaneous every 24 hours  ketorolac   Injectable 15 milliGRAM(s) IV Push once  metoprolol succinate ER 25 milliGRAM(s) Oral daily  sodium chloride 0.9%. 1000 milliLiter(s) IV Continuous <Continuous>      Allergies    No Known Allergies    Intolerances        PAST MEDICAL & SURGICAL HISTORY:  Alzheimer disease    No significant past surgical history    History of total knee arthroplasty, left    H/O total hip arthroplasty, right                              13.3   6.79  )-----------( 181      ( 03 Mar 2025 09:37 )             41.4       03 Mar 2025 09:37    139    |  104    |  16     ----------------------------<  110    4.4     |  23     |  0.46     Ca    9.6        03 Mar 2025 09:37  Mg     2.0       03 Mar 2025 09:37    TPro  7.9    /  Alb  4.1    /  TBili  0.5    /  DBili  x      /  AST  13     /  ALT  20     /  AlkPhos  116    03 Mar 2025 09:37      PT/INR - ( 03 Mar 2025 09:37 )   PT: 12.2 sec;   INR: 1.06 ratio         PTT - ( 03 Mar 2025 09:37 )  PTT:30.4 sec    Urinalysis Basic - ( 03 Mar 2025 09:44 )    Color: Yellow / Appearance: Clear / S.022 / pH: x  Gluc: x / Ketone: Negative mg/dL  / Bili: Negative / Urobili: 0.2 mg/dL   Blood: x / Protein: Negative mg/dL / Nitrite: Positive   Leuk Esterase: Small / RBC: 1 /HPF / WBC 10 /HPF   Sq Epi: x / Non Sq Epi: 1 /HPF / Bacteria: Many /HPF        Vital Signs Last 24 Hrs  T(C): 37.3 (25 @ 18:38), Max: 37.6 (25 @ 17:58)  T(F): 99.1 (25 @ 18:38), Max: 99.7 (25 @ 17:58)  HR: 65 (25 @ 18:38) (63 - 92)  BP: 154/97 (25 @ 18:38) (120/81 - 193/98)  BP(mean): --  RR: 20 (25 @ 18:38) (20 - 20)  SpO2: 99% (25 @ 18:38) (95% - 100%)    Gen: NAD    Spine PE:  Skin intact  No gross deformity  No midnline TTP C/T/L/S spine  No bony step offs  No paraspinal muscle ttp/hypertonicity   Negative clonus  Negative babinski  Negative golden  No saddle anesthesia    Not following commands to comply for formal neuro exam:  sensation intact to noxious stimuli to BLUE and BLLEs  Moving all upper and lower extremities with good strength             Imaging:   CT pelvis: Compression deformities of T11, L1, L4, and L5. Interval loss of vertebral body   height at L3 since .      A/P: 89y Female with chronic Compression deformities of T11, L1, L4, and L5 and new L3 VCF   Pain control  WBAT with assistive devices as needed  TLSO for comfort   medical management per primary team   No acute orthopaedic surgical intervention indicated at this time. This patient is orthopaedically stable for discharge.   Patient to follow up with Dr. Palm as an outpatient for further evaluation and management.   All of the patient's questions and concerns were answered and addressed.

## 2025-03-03 NOTE — CONSULT NOTE ADULT - TIME BILLING
Please note that over 55 minutes of time was spent in care of this patient including  - pre visit preparation  - in person visit  - post visit documentation  - discussion with colleagues  - review of imaging

## 2025-03-03 NOTE — ED ADULT TRIAGE NOTE - CHIEF COMPLAINT QUOTE
unwitnessed fall, found on floor by staff at [facility], +L forehead bleeding hematoma, +baseline confusion, +ccollar by EMS, head dressing by EMS

## 2025-03-03 NOTE — H&P ADULT - PROBLEM SELECTOR PLAN 1
- Per facility RN, mostly bedbound, was in normal state of health night prior, found on floor by staff this AM on side of bed without rail  - With contusion/lac to forehead s/p repair in ED  - CTH/C spine negative for acute bleed or dislocation  - XRay of b/l shoulders, XR R hip/femur/knee with no new fx, does have advanced OA and chronic fx  - CT chest/abd did note no acute intrathoracic findings, age-indeterminate mild loss of vertebral body height at L3 since 2023. Compression deformities of T11, L1, L4, L5.   - Ortho consulted in ED, PT eval pending ortho recs - XR LS ordered and pending at this time   - Pain control for now with tylenol/ibuprofen - Per facility RN, mostly bedbound, was in normal state of health night prior, found on floor by staff this AM on side of bed without rail  - With contusion/lac to forehead s/p repair in ED  - CTH/C spine negative for acute bleed or dislocation  - CK WNL, no signs of rhabdo on labs   - XRay of b/l shoulders, XR R hip/femur/knee with no new fx, does have advanced OA and chronic fx  - CT chest/abd did note no acute intrathoracic findings, age-indeterminate mild loss of vertebral body height at L3 since 2023. Compression deformities of T11, L1, L4, L5.   - Ortho consulted in ED, PT eval pending ortho recs - XR LS ordered and pending at this time   - Pain control for now with tylenol/ibuprofen - Per facility RN, mostly bedbound, was in normal state of health night prior, found on floor by staff this AM on side of bed without rail  - With contusion/lac to forehead s/p repair in ED  - CTH/C spine negative for acute bleed or dislocation  - CK WNL, no signs of rhabdo on labs   - XRay of b/l shoulders, XR R hip/femur/knee with no new fx, does have advanced OA and chronic fx  - CT chest/abd did note no acute intrathoracic findings, age-indeterminate mild loss of vertebral body height at L3 since 2023. Compression deformities of T11, L1, L4, L5.   - Ortho consulted in ED, PT eval pending ortho recs - XR LS ordered and pending at this time   - Pain control for now with tylenol/toradol - Per facility RN, mostly bedbound, was in normal state of health night prior, found on floor by staff this AM on side of bed without rail  - With contusion/lac to forehead s/p repair in ED  - CTH/C spine negative for acute bleed or dislocation  - CK WNL, no signs of rhabdo on labs   - XRay of b/l shoulders, XR R hip/femur/knee with no new fx, does have advanced OA and chronic fx  - CT chest/abd did note no acute intrathoracic findings, age-indeterminate mild loss of vertebral body height at L3 since 2023. Compression deformities of T11, L1, L4, L5.   - Ortho consulted in ED, PT eval pending ortho recs - XR LS ordered and pending at this time   - Pain control for now with tylenol, given one time dose of Toradol (upon PO, can transition to low-dose tramadol as needed)

## 2025-03-03 NOTE — H&P ADULT - NSHPPHYSICALEXAM_GEN_ALL_CORE
Vital Signs Last 24 Hrs  T(C): 36.2 (03 Mar 2025 13:23), Max: 36.3 (03 Mar 2025 09:20)  T(F): 97.2 (03 Mar 2025 13:23), Max: 97.3 (03 Mar 2025 09:20)  HR: 80 (03 Mar 2025 13:23) (63 - 80)  BP: 193/98 (03 Mar 2025 13:23) (149/89 - 193/98)  BP(mean): --  RR: 20 (03 Mar 2025 13:23) (20 - 20)  SpO2: 100% (03 Mar 2025 13:23) (95% - 100%)    Parameters below as of 03 Mar 2025 13:23  Patient On (Oxygen Delivery Method): room air    CONSTITUTIONAL: Well-developed, well-groomed, in no apparent distress  EYES: No conjunctival or scleral injection, non-icteric  ENMT: (+) laceration to L forehead s/p repair in ED; oral mucosa with moist membranes  RESPIRATORY: Breathing comfortably; lungs CTA without wheeze/rhonchi/rales  CARDIOVASCULAR: +S1S2, RRR, no M/G/R; pedal pulses full and symmetric; no lower extremity edema  GASTROINTESTINAL: No palpable masses or tenderness, +BS throughout, no rebound/guarding  MUSCULOSKELETAL: No digital clubbing or cyanosis  SKIN: Scattered areas of prior bruising, very small 1 cm superficial skin tear to R wrist, mottling of BLE  NEUROLOGIC: Calm, oriented to self only, able to answer a few questions, follow some commands on repeated questioning (able to wiggle toes)

## 2025-03-03 NOTE — H&P ADULT - NSHPLABSRESULTS_GEN_ALL_CORE
LABS:                         13.3   6.79  )-----------( 181      ( 03 Mar 2025 09:37 )             41.4         139  |  104  |  16  ----------------------------<  110[H]  4.4   |  23  |  0.46[L]    Ca    9.6      03 Mar 2025 09:37  Mg     2.0     03-03    TPro  7.9  /  Alb  4.1  /  TBili  0.5  /  DBili  x   /  AST  13  /  ALT  20  /  AlkPhos  116  03-03    PT/INR - ( 03 Mar 2025 09:37 )   PT: 12.2 sec;   INR: 1.06 ratio         PTT - ( 03 Mar 2025 09:37 )  PTT:30.4 sec  Urinalysis Basic - ( 03 Mar 2025 09:44 )    Color: Yellow / Appearance: Clear / S.022 / pH: x  Gluc: x / Ketone: Negative mg/dL  / Bili: Negative / Urobili: 0.2 mg/dL   Blood: x / Protein: Negative mg/dL / Nitrite: Positive   Leuk Esterase: Small / RBC: 1 /HPF / WBC 10 /HPF   Sq Epi: x / Non Sq Epi: 1 /HPF / Bacteria: Many /HPF      CARDIAC MARKERS ( 03 Mar 2025 09:37 )  x     / x     / x     / x     / 4.5 ng/mL      Records reviewed from prior hospitalization. Last hospitalized May 2023 w/R proximal humerus fx, R sup/inf pubic rami and R sacral ala fx after unwitnessed fall. Labs largely unremarkable. ED imaging including CTH/C spine, Xray shoulders/R femur/hip/knee reviewed.

## 2025-03-03 NOTE — H&P ADULT - PROBLEM SELECTOR PLAN 3
- At baseline, docile, oriented to self, answers some questions/has short word sentences but does not partake in prolonged conversation, on regular consistency, easy/soft to chew texture diet  - Per staff, no longer on donepezil  - Per ED RN, pt failed dysphagia screen, unable to follow commands, or even take sips of water  - Speech/swallow evaluation

## 2025-03-03 NOTE — ED PROVIDER NOTE - DATE/TIME 1

## 2025-03-03 NOTE — H&P ADULT - ASSESSMENT
89 y.o. F with pmhx notable for Alzheimer's disease, HTN, MDD who presents from [facility] [place] after unwitnessed fall. Per

## 2025-03-03 NOTE — H&P ADULT - PROBLEM SELECTOR PLAN 4
- At baseline incontinent of b/b per facility staff  - UA with many abiodun, small LE and positive nitrites, only 10 WBCs  - Given dose of CTX in ED, c/w same for now in setting of dementia

## 2025-03-03 NOTE — ED ADULT NURSE NOTE - NSFALLHARMRISKINTERV_ED_ALL_ED

## 2025-03-03 NOTE — ED ADULT NURSE REASSESSMENT NOTE - NS ED NURSE REASSESS COMMENT FT1
Pt soiled in urine. Diaper and bed changed. Comfort and safety provided.
Alert and oriented to person, place and time

## 2025-03-03 NOTE — ED PROVIDER NOTE - CLINICAL SUMMARY MEDICAL DECISION MAKING FREE TEXT BOX
89-year-old female history of Alzheimer's dementia, hip fracture  Presenting after fall.  Patient was last seen normal on her nursing home around 8 PM last night was found this morning laying on her left side on the floor.  Unknown mechanism of the fall or how long she was down for.  On exam primary survey intact, patient is oriented to name not place time or situation.  On secondary survey she has pain with range of motion of the right shoulder and right hip.  Will obtain imaging, labs, laceration repair.

## 2025-03-03 NOTE — H&P ADULT - PROBLEM SELECTOR PLAN 6
- DVT ppx: lovenox  - Diet: pending speech eval  - Code status: DNR/DNI, no feeding tube per [facility] faxed over from facility  - Son Yuri is HCP, updated via phone  - Discussed with ACP - DVT ppx: lovenox  - Diet: pending speech eval  - Code status: DNR/DNI, no feeding tube per [facility] faxed over from facility  - Son Yuri is HCP, updated via phone - is traveling abroad at this time, asks that we call his wife Philly's phone at [phone] with updates  - Discussed with ACP

## 2025-03-03 NOTE — H&P ADULT - HISTORY OF PRESENT ILLNESS
89 y.o. F with pmhx notable for Alzheimer's disease, HTN, MDD who presents from [facility] [place] for fall. Per facility caretaker, Nick, pt was in normal state of health yesterday evening, last seen around 8 PM. Notes this AM, was found on floor in her room along side of bed without guard-rail, with hematoma/bleeding to L forehead. Unclear mechanism of fall, unknown duration of 'down-time'. Per staff, at baseline pt very docile, oriented to self at baseline, says a few words but doesn't partake in prolonged conversation. Mostly bed-bound, incontinent of b/b in depends.    Upon arrival pt hypertensive w/BP up to 189/104 but with remainder of VSS stable. With sutures applied to L forehead lesion in ED. Labs largely unremarkable, CT and Xray imaging noted for lumbar compression fx for which ortho was consulted. Medicine called for admission. 89 y.o. F with pmhx notable for Alzheimer's disease, HTN, MDD who presents from [facility] [place] for fall. Per facility caretaker, Nick, pt was in normal state of health yesterday evening, last seen around 8 PM. Notes this AM, was found on floor in her room along side of bed without guard-rail, with hematoma/bleeding to L forehead. Unclear mechanism of fall, unknown duration of 'down-time'. Per staff, at baseline pt very docile, oriented to self at baseline, says a few words but doesn't partake in prolonged conversation. Mostly bed-bound, incontinent of b/b in depends.    Upon arrival pt hypertensive w/BP up to 189/104 for which pt given 5 mg hydralazine IV but with remainder of VSS stable. With sutures applied to L forehead lesion in ED. Labs largely unremarkable, CT and Xray imaging noted for lumbar compression fx for which ortho was consulted. Medicine called for admission.

## 2025-03-04 PROCEDURE — 99222 1ST HOSP IP/OBS MODERATE 55: CPT

## 2025-03-04 PROCEDURE — 99233 SBSQ HOSP IP/OBS HIGH 50: CPT

## 2025-03-04 RX ADMIN — CITALOPRAM 10 MILLIGRAM(S): 20 TABLET ORAL at 13:05

## 2025-03-04 RX ADMIN — METOPROLOL SUCCINATE 25 MILLIGRAM(S): 50 TABLET, EXTENDED RELEASE ORAL at 05:58

## 2025-03-04 RX ADMIN — CYANOCOBALAMIN 1000 MICROGRAM(S): 1000 INJECTION INTRAMUSCULAR; SUBCUTANEOUS at 13:05

## 2025-03-04 RX ADMIN — Medication 500 MILLIGRAM(S): at 13:05

## 2025-03-04 RX ADMIN — ENOXAPARIN SODIUM 40 MILLIGRAM(S): 100 INJECTION SUBCUTANEOUS at 18:13

## 2025-03-04 RX ADMIN — CEFTRIAXONE 100 MILLIGRAM(S): 500 INJECTION, POWDER, FOR SOLUTION INTRAMUSCULAR; INTRAVENOUS at 05:57

## 2025-03-04 NOTE — PROGRESS NOTE ADULT - PROBLEM SELECTOR PLAN 4
- At baseline incontinent of b/b per facility staff  - UA positive, given dose of CTX in ED, c/w same for now in setting of dementia  - F/u UCx

## 2025-03-04 NOTE — ADVANCED PRACTICE NURSE CONSULT - ASSESSMENT
Arrived on unit, patient was found lying in a low air loss pressure redistribution support surface style bed.  Ms. Smith was unable to turn independently, staff assist x 2 achieved turning, once turned, able to view her skin. Patient with a prima fit external catheter for urinary diversion.    Skin assessment reveals; B/L Buttocks/ Sacrum blanchable erythema and intact skin, size approximately 4.0 cm x 6.0 cm x 0.0 cm, present on admission. Due to blanchable erythema and intact skin, a deep tissue injury cannot be ruled out at this time. Cleansed with incontinence cleanser, pat dry, and applied Barbara moisturizing cream for soothing inflammation related to incontinence, and to block out wetness, which can result in skin erosion. Patient appropriately offloaded with Ralph-Lock positioner pillow, providing a stable, contouring base for optimal support.    Bilateral heels with blanchable erythema and intact skin. No open wounds or ulcerations. Patient's heel appropriately offloaded and "floating" with use of Ralph-Lock positioner pillow.     RNs were educated on the importance of turning and positioning every 2 hours. The importance of keeping her skin clean and dry and to offload feet/heels, and optimal nutrition.         When the consultation was completed, the patient was left in a right-sided position, with side rails up, call bell within reach, and bed in lowest position. Discussed plan of care with Yessenia (RN).

## 2025-03-04 NOTE — PROGRESS NOTE ADULT - PROBLEM SELECTOR PLAN 1
- Per facility RN, mostly bedbound, was in normal state of health night prior, found on floor by staff this AM on side of bed without rail  - With contusion/lac to forehead s/p repair in ED  - CTH/C spine negative for acute bleed or dislocation  - CK WNL, no signs of rhabdo on labs   - XRay of b/l shoulders, XR R hip/femur/knee with no new fx, does have advanced OA and chronic fx  - CT chest/abd did note no acute intrathoracic findings, age-indeterminate mild loss of vertebral body height at L3 since 2023. Compression deformities of T11, L1, L4, L5.   - XR LS as reported: Redemonstrated severe T12, moderate to severe L4, mild to moderate L3, and mild T11 superior endplate compression fracture deformities without gross progressive collapse. Remaining visualized vertebral body heights maintained. Redemonstrated multilevel degenerative changes including lower lumbar predominant posterior facet arthrosis. Partially visualized right total hip prosthesis and left hip fracture fixation hardware. Generalized osteopenia otherwise no discrete lytic or blastic lesions.  - Ortho recommendations appreciated: "89y Female with chronic Compression deformities of T11, L1, L4, and L5 and new L3 VCF; Pain control; WBAT with assistive devices as needed; TLSO for comfort; medical management per primary team; no acute orthopaedic surgical intervention indicated at this time. This patient is orthopaedically stable for discharge.   - Pain control for now with tylenol, given one time dose of Toradol (upon PO, can transition to low-dose tramadol as needed)

## 2025-03-04 NOTE — CHART NOTE - NSCHARTNOTEFT_GEN_A_CORE
Patient was measured and dispensed a TLSO rigid posterior panel soft apron front. The orthosis will stabilize and control the spine, reduce ROM and pain and safely protect the fracture sites. All went without incident.   [facility]  [phone] no...

## 2025-03-04 NOTE — PROGRESS NOTE ADULT - PROBLEM SELECTOR PLAN 2
- As noted above, CT findings of compression deformities of T11, L1, L4, L5  - XR LS as reported: Redemonstrated severe T12, moderate to severe L4, mild to moderate L3, and mild T11 superior endplate compression fracture deformities without gross progressive collapse. Remaining visualized vertebral body heights maintained. Redemonstrated multilevel degenerative changes including lower lumbar predominant posterior facet arthrosis. Partially visualized right total hip prosthesis and left hip fracture fixation hardware. Generalized osteopenia otherwise no discrete lytic or blastic lesions.  - Orthopedics following

## 2025-03-04 NOTE — ADVANCED PRACTICE NURSE CONSULT - REASON FOR CONSULT
Wound consult requested to assess skin status - sacrum, suspected deep tissue injury, present on admission.  HPI: 89 y.o. F with pmhx notable for Alzheimer's disease, HTN, MDD who presents from [facility] [place] for fall. Per facility caretaker, Nick, pt was in normal state of health yesterday evening, last seen around 8 PM. Notes this AM, was found on floor in her room along side of bed without guard-rail, with hematoma/bleeding to L forehead. Unclear mechanism of fall, unknown duration of 'down-time'. Per staff, at baseline pt very docile, oriented to self at baseline, says a few words but doesn't partake in prolonged conversation. Mostly bed-bound, incontinent of b/b in depends.

## 2025-03-04 NOTE — PROGRESS NOTE ADULT - SUBJECTIVE AND OBJECTIVE BOX
INCOMPLETE NOTE    Gogo Colón M.D.  Division of Hospital Medicine  Available on [employer] TEAMS    SUBJECTIVE / ACUTE INTERVAL EVENTS: Patient seen and examined. No overnight events. No subjective complaints.     OBJECTIVE:   Vital Signs Last 24 Hrs  T(F): 98.6 (04 Mar 2025 05:08), Max: 99.7 (03 Mar 2025 17:58)  HR: 100 (04 Mar 2025 05:08) (63 - 103)  BP: 106/64 (04 Mar 2025 05:08) (106/64 - 193/98)  RR: 18 (04 Mar 2025 05:08) (18 - 20)  SpO2: 94% (04 Mar 2025 05:08) (94% - 100%)  Parameters below as of 04 Mar 2025 05:08  Patient On (Oxygen Delivery Method): room air    I&O's Summary  03 Mar 2025 07:01  -  04 Mar 2025 07:00  --------------------------------------------------------  IN: 0 mL / OUT: 600 mL / NET: -600 mL    Physical Examination:  GEN: elderly woman, laying in bed in NAD  PSYCH: A&Ox1, mood and affect appear appropriate   NEURO: no focal neurologic deficits appreciated  RESPI: no accessory muscle use, B/L air entry   CARDIO: regular rate/rhythm, no LE edema B/L  ABD: soft, NT, ND, +BS  EXT: patient able to move all extremities spontaneously  VASC: peripheral pulses palpated    Labs:                     13.3   6.79  )-----------( 181      ( 03 Mar 2025 09:37 )             41.4     03-03  139  |  104  |  16  ----------------------------<  110[H]  4.4   |  23  |  0.46[L]    Ca    9.6      03 Mar 2025 09:37  Mg     2.0     03-03    TPro  7.9  /  Alb  4.1  /  TBili  0.5  /  DBili  x   /  AST  13  /  ALT  20  /  AlkPhos  116  03-03    PT/INR - ( 03 Mar 2025 09:37 )   PT: 12.2 sec;   INR: 1.06 ratio    PTT - ( 03 Mar 2025 09:37 )  PTT:30.4 sec    CARDIAC MARKERS ( 03 Mar 2025 09:37 )  x     / x     / x     / x     / 4.5 ng/mL    Urinalysis Basic - ( 03 Mar 2025 09:44 )  Color: Yellow / Appearance: Clear / S.022 / pH: x  Gluc: x / Ketone: Negative mg/dL  / Bili: Negative / Urobili: 0.2 mg/dL   Blood: x / Protein: Negative mg/dL / Nitrite: Positive   Leuk Esterase: Small / RBC: 1 /HPF / WBC 10 /HPF   Sq Epi: x / Non Sq Epi: 1 /HPF / Bacteria: Many /HPF    Imaging Personally Reviewed:  - XR LS as reported: Redemonstrated severe T12, moderate to severe L4, mild to moderate L3, and mild T11 superior endplate compression fracture deformities without gross progressive collapse. Remaining visualized vertebral body heights   maintained. Redemonstrated multilevel degenerative changes including lower lumbar predominant posterior facet arthrosis. Partially visualized right total hip prosthesis and left hip fracture fixation hardware. Generalized osteopenia otherwise no discrete lytic or blastic lesions.    Consultant(s) Notes Reviewed: Orthopedics    Care Discussed with Consultants/Other Providers: PREETI Patrick    MEDICATIONS  (STANDING):  ascorbic acid 500 milliGRAM(s) Oral daily  cefTRIAXone   IVPB 1000 milliGRAM(s) IV Intermittent every 24 hours  citalopram 10 milliGRAM(s) Oral daily  cyanocobalamin 1000 MICROGram(s) Oral daily  enoxaparin Injectable 40 milliGRAM(s) SubCutaneous every 24 hours  ketorolac   Injectable 15 milliGRAM(s) IV Push once  metoprolol succinate ER 25 milliGRAM(s) Oral daily  sodium chloride 0.9%. 1000 milliLiter(s) (50 mL/Hr) IV Continuous <Continuous>    MEDICATIONS  (PRN):  acetaminophen     Tablet .. 650 milliGRAM(s) Oral every 6 hours PRN Temp greater or equal to 38C (100.4F), Mild Pain (1 - 3) Gogo Colón M.D.  Division of Hospital Medicine  Available on [employer] TEAMS    SUBJECTIVE / ACUTE INTERVAL EVENTS: Patient seen and examined. No overnight events. No subjective complaints. Appreciate orthopedics' evaluation and recommendations -- will obtain TLSO brace and f/u PT's recommendations. Patient's HCP/emergency contacts unable to be reached: Jose at numbers listed -- will attempt again later this afternoon to update them on patient's clinical course and plans for management.     OBJECTIVE:   Vital Signs Last 24 Hrs  T(F): 98.6 (04 Mar 2025 05:08), Max: 99.7 (03 Mar 2025 17:58)  HR: 100 (04 Mar 2025 05:08) (63 - 103)  BP: 106/64 (04 Mar 2025 05:08) (106/64 - 193/98)  RR: 18 (04 Mar 2025 05:08) (18 - 20)  SpO2: 94% (04 Mar 2025 05:08) (94% - 100%)  Parameters below as of 04 Mar 2025 05:08  Patient On (Oxygen Delivery Method): room air    I&O's Summary  03 Mar 2025 07:01  -  04 Mar 2025 07:00  --------------------------------------------------------  IN: 0 mL / OUT: 600 mL / NET: -600 mL    Physical Examination:  GEN: elderly woman, laying in bed in NAD  PSYCH: A&Ox1, mood and affect appear appropriate   NEURO: no focal neurologic deficits appreciated  RESPI: no accessory muscle use, B/L air entry   CARDIO: regular rate/rhythm, no LE edema B/L  ABD: soft, NT, ND, +BS  EXT: patient able to move all extremities spontaneously  VASC: peripheral pulses palpated    Labs:                     13.3   6.79  )-----------( 181      ( 03 Mar 2025 09:37 )             41.4       139  |  104  |  16  ----------------------------<  110[H]  4.4   |  23  |  0.46[L]    Ca    9.6      03 Mar 2025 09:37  Mg     2.0     -    TPro  7.9  /  Alb  4.1  /  TBili  0.5  /  DBili  x   /  AST  13  /  ALT  20  /  AlkPhos  116  -03    PT/INR - ( 03 Mar 2025 09:37 )   PT: 12.2 sec;   INR: 1.06 ratio    PTT - ( 03 Mar 2025 09:37 )  PTT:30.4 sec    CARDIAC MARKERS ( 03 Mar 2025 09:37 )  x     / x     / x     / x     / 4.5 ng/mL    Urinalysis Basic - ( 03 Mar 2025 09:44 )  Color: Yellow / Appearance: Clear / S.022 / pH: x  Gluc: x / Ketone: Negative mg/dL  / Bili: Negative / Urobili: 0.2 mg/dL   Blood: x / Protein: Negative mg/dL / Nitrite: Positive   Leuk Esterase: Small / RBC: 1 /HPF / WBC 10 /HPF   Sq Epi: x / Non Sq Epi: 1 /HPF / Bacteria: Many /HPF    Imaging Personally Reviewed:  - XR LS as reported: Redemonstrated severe T12, moderate to severe L4, mild to moderate L3, and mild T11 superior endplate compression fracture deformities without gross progressive collapse. Remaining visualized vertebral body heights   maintained. Redemonstrated multilevel degenerative changes including lower lumbar predominant posterior facet arthrosis. Partially visualized right total hip prosthesis and left hip fracture fixation hardware. Generalized osteopenia otherwise no discrete lytic or blastic lesions.    Consultant(s) Notes Reviewed: Orthopedics    Care Discussed with Consultants/Other Providers: PREETI Patrick    MEDICATIONS  (STANDING):  ascorbic acid 500 milliGRAM(s) Oral daily  cefTRIAXone   IVPB 1000 milliGRAM(s) IV Intermittent every 24 hours  citalopram 10 milliGRAM(s) Oral daily  cyanocobalamin 1000 MICROGram(s) Oral daily  enoxaparin Injectable 40 milliGRAM(s) SubCutaneous every 24 hours  ketorolac   Injectable 15 milliGRAM(s) IV Push once  metoprolol succinate ER 25 milliGRAM(s) Oral daily  sodium chloride 0.9%. 1000 milliLiter(s) (50 mL/Hr) IV Continuous <Continuous>    MEDICATIONS  (PRN):  acetaminophen     Tablet .. 650 milliGRAM(s) Oral every 6 hours PRN Temp greater or equal to 38C (100.4F), Mild Pain (1 - 3)

## 2025-03-04 NOTE — ADVANCED PRACTICE NURSE CONSULT - RECOMMEDATIONS
Impression     urinary incontinence   bilateral sacrum/buttock blanchable erythema, skin intact.   bilateral heels blanchable erythema, skin intact.           Recommendations      1. Bilateral sacrum/buttock cannot rule out DTI POA             opical therapy- sacral/bilateral buttocks- cleanse w/incontinent cleanser, pat dry & apply Barbara moisturizer twice daily & prn soiling. Monitor for changes      2. Right and left heel       Elevate heels; ensure that the soles of the feet are not resting on the foot board of the bed.     3 Incontinent management - incontinent cleanser, pads, franklin care BID     4. Maintain on an alternating air with low air loss surface      5. Turn & reposition every 2 hr; Use positioning pillow to turn and reposition, soft pillow between bony prominences; continue measures to decrease friction/shear/pressure.     6. Nutrition optimization.     7. Place waffle cushion when out of bed to chair.

## 2025-03-04 NOTE — PROGRESS NOTE ADULT - PROBLEM SELECTOR PLAN 6
- DVT ppx: lovenox  - Code status: DNR/DNI, no feeding tube per [facility] faxed over from facility  - Son Yuri is HCP, updated via phone on admission - is traveling [place] at this time, asks that we call his wife Philly's phone at [phone] with updates -- called on 3/4, left VM  - Discussed with ACP

## 2025-03-05 LAB
-  AMPICILLIN/SULBACTAM: SIGNIFICANT CHANGE UP
-  AMPICILLIN: SIGNIFICANT CHANGE UP
-  AZTREONAM: SIGNIFICANT CHANGE UP
-  CEFAZOLIN: SIGNIFICANT CHANGE UP
-  CEFEPIME: SIGNIFICANT CHANGE UP
-  CEFTRIAXONE: SIGNIFICANT CHANGE UP
-  CEFUROXIME: SIGNIFICANT CHANGE UP
-  CIPROFLOXACIN: SIGNIFICANT CHANGE UP
-  ERTAPENEM: SIGNIFICANT CHANGE UP
-  GENTAMICIN: SIGNIFICANT CHANGE UP
-  IMIPENEM: SIGNIFICANT CHANGE UP
-  LEVOFLOXACIN: SIGNIFICANT CHANGE UP
-  MEROPENEM: SIGNIFICANT CHANGE UP
-  NITROFURANTOIN: SIGNIFICANT CHANGE UP
-  PIPERACILLIN/TAZOBACTAM: SIGNIFICANT CHANGE UP
-  TOBRAMYCIN: SIGNIFICANT CHANGE UP
-  TRIMETHOPRIM/SULFAMETHOXAZOLE: SIGNIFICANT CHANGE UP
CULTURE RESULTS: ABNORMAL
METHOD TYPE: SIGNIFICANT CHANGE UP
ORGANISM # SPEC MICROSCOPIC CNT: ABNORMAL
ORGANISM # SPEC MICROSCOPIC CNT: ABNORMAL
SPECIMEN SOURCE: SIGNIFICANT CHANGE UP

## 2025-03-05 PROCEDURE — 99232 SBSQ HOSP IP/OBS MODERATE 35: CPT

## 2025-03-05 RX ORDER — PIPERACILLIN-TAZO-DEXTROSE,ISO 3.375G/5
3.38 IV SOLUTION, PIGGYBACK PREMIX FROZEN(ML) INTRAVENOUS EVERY 8 HOURS
Refills: 0 | Status: COMPLETED | OUTPATIENT
Start: 2025-03-06 | End: 2025-03-08

## 2025-03-05 RX ORDER — PIPERACILLIN-TAZO-DEXTROSE,ISO 3.375G/5
3.38 IV SOLUTION, PIGGYBACK PREMIX FROZEN(ML) INTRAVENOUS ONCE
Refills: 0 | Status: COMPLETED | OUTPATIENT
Start: 2025-03-05 | End: 2025-03-05

## 2025-03-05 RX ADMIN — Medication 650 MILLIGRAM(S): at 20:59

## 2025-03-05 RX ADMIN — Medication 200 GRAM(S): at 17:01

## 2025-03-05 RX ADMIN — Medication 25 GRAM(S): at 21:00

## 2025-03-05 RX ADMIN — CEFTRIAXONE 100 MILLIGRAM(S): 500 INJECTION, POWDER, FOR SOLUTION INTRAMUSCULAR; INTRAVENOUS at 05:49

## 2025-03-05 RX ADMIN — Medication 500 MILLIGRAM(S): at 12:45

## 2025-03-05 RX ADMIN — CITALOPRAM 10 MILLIGRAM(S): 20 TABLET ORAL at 12:44

## 2025-03-05 RX ADMIN — ENOXAPARIN SODIUM 40 MILLIGRAM(S): 100 INJECTION SUBCUTANEOUS at 15:24

## 2025-03-05 RX ADMIN — Medication 650 MILLIGRAM(S): at 21:59

## 2025-03-05 RX ADMIN — METOPROLOL SUCCINATE 25 MILLIGRAM(S): 50 TABLET, EXTENDED RELEASE ORAL at 05:49

## 2025-03-05 RX ADMIN — CYANOCOBALAMIN 1000 MICROGRAM(S): 1000 INJECTION INTRAMUSCULAR; SUBCUTANEOUS at 12:45

## 2025-03-05 NOTE — PHYSICAL THERAPY INITIAL EVALUATION ADULT - PERTINENT HX OF CURRENT PROBLEM, REHAB EVAL
89-year-old female history of Alzheimer's dementia, hip fracture  Presenting after fall.  Patient was last seen normal on her nursing home around 8 PM last night was found this morning laying on her left side on the floor.  Unknown mechanism of the fall or how long she was down for.  On exam primary survey intact, patient is oriented to name not place time or situation.  On secondary survey she has pain with range of motion of the right shoulder and right hip. patient has a scalp lac and found to have multiple age indeterminate VCFs (T11, L1, 4, 5) and new L3 VCF on CT imaging.WBAT with assistive devices as neededTLSO for comfort   No acute orthopaedic surgical intervention indicated at this time. CT pelvis: Compression deformities of T11, L1, L4, and L5. Interval loss of vertebral body   height at L3 since 2023. CTH/C spine negative for acute bleed or dislocation  - CK WNL, no signs of rhabdo on labs   - XRay of b/l shoulders, XR R hip/femur/knee with no new fx, does have advanced OA and chronic fx 89-year-old female history of Alzheimer's dementia, hip fracture  Presenting after fall.  Patient was last seen normal on her nursing home around 8 PM last night was found this morning laying on her left side on the floor.  Unknown mechanism of the fall or how long she was down for.  On exam primary survey intact, patient is oriented to name not place time or situation.  On secondary survey she has pain with range of motion of the right shoulder and right hip. patient has a scalp lac and found to have multiple age indeterminate VCFs (T11, L1, 4, 5) and new L3 VCF on CT imaging.WBAT with assistive devices as neededTLSO for comfort   No acute orthopaedic surgical intervention indicated at this time. CT pelvis: Compression deformities of T11, L1, L4, and L5. Interval loss of vertebral body   height at L3 since 2023. CTH/C spine negative for acute bleed or dislocation  - CK WNL, no signs of rhabdo on labs   - XRay of b/l shoulders-  Right shoulder radiographs demonstrate a chronic deformity of the   proximal humerus. , XR R hip/femur/knee with no new fx, does have advanced OA and chronic fx

## 2025-03-05 NOTE — PHYSICAL THERAPY INITIAL EVALUATION ADULT - PRECAUTIONS/LIMITATIONS, REHAB EVAL
fall precautions/spinal precautions TLSO brace for comfort when oob./fall precautions/spinal precautions

## 2025-03-05 NOTE — PROGRESS NOTE ADULT - PROBLEM SELECTOR PLAN 6
- DVT ppx: lovenox  - Code status: DNR/DNI, no feeding tube per [facility] faxed over from facility  - Son Yuri is HCP, updated via phone on admission - is traveling abroad at this time, asks that we call his wife Philly's phone at [phone] with updates -- called on 3/4, left VM  - Discussed with ACP - DVT ppx: lovenox  - Code status: DNR/DNI, no feeding tube per [facility] faxed over from facility  - Son Yuri is HCP, updated via phone on admission - is traveling abroad at this time, asks that we call his wife Philly's phone at [phone] with updates -- called on 3/4 and 3/5, [facility]

## 2025-03-05 NOTE — PHYSICAL THERAPY INITIAL EVALUATION ADULT - BALANCE TRAINING, PT EVAL
GOAL: Pt will improve  balance during (static/dynamic) (sitting) activities by at least 1 balance grade within 3-4 weeks to assist with greater independence during functional mobility and ADL's.

## 2025-03-05 NOTE — PROGRESS NOTE ADULT - SUBJECTIVE AND OBJECTIVE BOX
INCOMPLETE NOTE    Gogo Colón M.D.  Division of Hospital Medicine  Available on [employer] TEAMS    SUBJECTIVE / ACUTE INTERVAL EVENTS: Patient seen and examined. No overnight events. No subjective complaints.     OBJECTIVE:   Vital Signs Last 24 Hrs  T(F): 99.3 (05 Mar 2025 04:32), Max: 99.3 (05 Mar 2025 04:32)  HR: 97 (05 Mar 2025 04:32) (88 - 104)  BP: 114/74 (05 Mar 2025 04:32) (106/70 - 131/86)  RR: 18 (05 Mar 2025 04:32) (18 - 18)  SpO2: 94% (05 Mar 2025 04:32) (94% - 95%)  Parameters below as of 05 Mar 2025 04:32  Patient On (Oxygen Delivery Method): room air    I&O's Summary  04 Mar 2025 07:01  -  05 Mar 2025 07:00  --------------------------------------------------------  IN: 0 mL / OUT: 300 mL / NET: -300 mL    Physical Examination:  GEN: elderly woman, laying in bed in NAD  PSYCH: A&Ox1, mood and affect appear appropriate   NEURO: no focal neurologic deficits appreciated  RESPI: no accessory muscle use, B/L air entry   CARDIO: regular rate/rhythm, no LE edema B/L  ABD: soft, NT, ND, +BS  EXT: patient able to move all extremities spontaneously  VASC: peripheral pulses palpated    Labs:                      13.3   6.79  )-----------( 181      ( 03 Mar 2025 09:37 )             41.4     03-03  139  |  104  |  16  ----------------------------<  110[H]  4.4   |  23  |  0.46[L]    Ca    9.6      03 Mar 2025 09:37  Mg     2.0     03-03    TPro  7.9  /  Alb  4.1  /  TBili  0.5  /  DBili  x   /  AST  13  /  ALT  20  /  AlkPhos  116  03-03    PT/INR - ( 03 Mar 2025 09:37 )   PT: 12.2 sec;   INR: 1.06 ratio    PTT - ( 03 Mar 2025 09:37 )  PTT:30.4 sec    CARDIAC MARKERS ( 03 Mar 2025 09:37 )  x     / x     / x     / x     / 4.5 ng/mL    Urinalysis Basic - ( 03 Mar 2025 09:44 )  Color: Yellow / Appearance: Clear / S.022 / pH: x  Gluc: x / Ketone: Negative mg/dL  / Bili: Negative / Urobili: 0.2 mg/dL   Blood: x / Protein: Negative mg/dL / Nitrite: Positive   Leuk Esterase: Small / RBC: 1 /HPF / WBC 10 /HPF   Sq Epi: x / Non Sq Epi: 1 /HPF / Bacteria: Many /HPF    Consultant(s) Notes Reviewed: Wound Care    Care Discussed with Consultants/Other Providers: PREETI Patrick    MEDICATIONS  (STANDING):  ascorbic acid 500 milliGRAM(s) Oral daily  cefTRIAXone   IVPB 1000 milliGRAM(s) IV Intermittent every 24 hours  citalopram 10 milliGRAM(s) Oral daily  cyanocobalamin 1000 MICROGram(s) Oral daily  enoxaparin Injectable 40 milliGRAM(s) SubCutaneous every 24 hours  ketorolac   Injectable 15 milliGRAM(s) IV Push once  metoprolol succinate ER 25 milliGRAM(s) Oral daily  sodium chloride 0.9%. 1000 milliLiter(s) (50 mL/Hr) IV Continuous <Continuous>    MEDICATIONS  (PRN):  acetaminophen     Tablet .. 650 milliGRAM(s) Oral every 6 hours PRN Temp greater or equal to 38C (100.4F), Mild Pain (1 - 3) Gogo Colón M.D.  Division of Hospital Medicine  Available on [employer] TEAMS    SUBJECTIVE / ACUTE INTERVAL EVENTS: Patient seen and examined. No overnight events. No subjective complaints.     OBJECTIVE:   Vital Signs Last 24 Hrs  T(F): 99.3 (05 Mar 2025 04:32), Max: 99.3 (05 Mar 2025 04:32)  HR: 97 (05 Mar 2025 04:32) (88 - 104)  BP: 114/74 (05 Mar 2025 04:32) (106/70 - 131/86)  RR: 18 (05 Mar 2025 04:32) (18 - 18)  SpO2: 94% (05 Mar 2025 04:32) (94% - 95%)  Parameters below as of 05 Mar 2025 04:32  Patient On (Oxygen Delivery Method): room air    I&O's Summary  04 Mar 2025 07:01  -  05 Mar 2025 07:00  --------------------------------------------------------  IN: 0 mL / OUT: 300 mL / NET: -300 mL    Physical Examination:  GEN: elderly woman, laying in bed in NAD  PSYCH: A&Ox1, mood and affect appear appropriate   NEURO: no focal neurologic deficits appreciated  RESPI: no accessory muscle use, B/L air entry   CARDIO: regular rate/rhythm, no LE edema B/L  ABD: soft, NT, ND, +BS  EXT: patient able to move all extremities spontaneously  VASC: peripheral pulses palpated    Labs:                      13.3   6.79  )-----------( 181      ( 03 Mar 2025 09:37 )             41.4     03-03  139  |  104  |  16  ----------------------------<  110[H]  4.4   |  23  |  0.46[L]    Ca    9.6      03 Mar 2025 09:37  Mg     2.0     03-03    TPro  7.9  /  Alb  4.1  /  TBili  0.5  /  DBili  x   /  AST  13  /  ALT  20  /  AlkPhos  116  03-03    PT/INR - ( 03 Mar 2025 09:37 )   PT: 12.2 sec;   INR: 1.06 ratio    PTT - ( 03 Mar 2025 09:37 )  PTT:30.4 sec    CARDIAC MARKERS ( 03 Mar 2025 09:37 )  x     / x     / x     / x     / 4.5 ng/mL    Urinalysis Basic - ( 03 Mar 2025 09:44 )  Color: Yellow / Appearance: Clear / S.022 / pH: x  Gluc: x / Ketone: Negative mg/dL  / Bili: Negative / Urobili: 0.2 mg/dL   Blood: x / Protein: Negative mg/dL / Nitrite: Positive   Leuk Esterase: Small / RBC: 1 /HPF / WBC 10 /HPF   Sq Epi: x / Non Sq Epi: 1 /HPF / Bacteria: Many /HPF    Consultant(s) Notes Reviewed: Wound Care    Care Discussed with Consultants/Other Providers: PREETI Patrick    MEDICATIONS  (STANDING):  ascorbic acid 500 milliGRAM(s) Oral daily  cefTRIAXone   IVPB 1000 milliGRAM(s) IV Intermittent every 24 hours  citalopram 10 milliGRAM(s) Oral daily  cyanocobalamin 1000 MICROGram(s) Oral daily  enoxaparin Injectable 40 milliGRAM(s) SubCutaneous every 24 hours  ketorolac   Injectable 15 milliGRAM(s) IV Push once  metoprolol succinate ER 25 milliGRAM(s) Oral daily  sodium chloride 0.9%. 1000 milliLiter(s) (50 mL/Hr) IV Continuous <Continuous>    MEDICATIONS  (PRN):  acetaminophen     Tablet .. 650 milliGRAM(s) Oral every 6 hours PRN Temp greater or equal to 38C (100.4F), Mild Pain (1 - 3) Gogo Colón M.D.  Division of Hospital Medicine  Available on [employer] TEAMS    SUBJECTIVE / ACUTE INTERVAL EVENTS: Patient seen and examined. No overnight events. No subjective complaints. Continuing to attempt to reach BRITTANI -- phone goes straight to VM, VM left.     OBJECTIVE:   Vital Signs Last 24 Hrs  T(F): 99.3 (05 Mar 2025 04:32), Max: 99.3 (05 Mar 2025 04:32)  HR: 97 (05 Mar 2025 04:32) (88 - 104)  BP: 114/74 (05 Mar 2025 04:32) (106/70 - 131/86)  RR: 18 (05 Mar 2025 04:32) (18 - 18)  SpO2: 94% (05 Mar 2025 04:32) (94% - 95%)  Parameters below as of 05 Mar 2025 04:32  Patient On (Oxygen Delivery Method): room air    I&O's Summary  04 Mar 2025 07:01  -  05 Mar 2025 07:00  --------------------------------------------------------  IN: 0 mL / OUT: 300 mL / NET: -300 mL    Physical Examination:  GEN: elderly woman, laying in bed in NAD  PSYCH: A&Ox1, mood and affect appear appropriate   NEURO: no focal neurologic deficits appreciated  RESPI: no accessory muscle use, B/L air entry   CARDIO: regular rate/rhythm, no LE edema B/L  ABD: soft, NT, ND, +BS  EXT: patient able to move all extremities spontaneously  VASC: peripheral pulses palpated    Labs:                      13.3   6.79  )-----------( 181      ( 03 Mar 2025 09:37 )             41.4     -03  139  |  104  |  16  ----------------------------<  110[H]  4.4   |  23  |  0.46[L]    Ca    9.6      03 Mar 2025 09:37  Mg     2.0     03-03    TPro  7.9  /  Alb  4.1  /  TBili  0.5  /  DBili  x   /  AST  13  /  ALT  20  /  AlkPhos  116  03-03    PT/INR - ( 03 Mar 2025 09:37 )   PT: 12.2 sec;   INR: 1.06 ratio    PTT - ( 03 Mar 2025 09:37 )  PTT:30.4 sec    CARDIAC MARKERS ( 03 Mar 2025 09:37 )  x     / x     / x     / x     / 4.5 ng/mL    Urinalysis Basic - ( 03 Mar 2025 09:44 )  Color: Yellow / Appearance: Clear / S.022 / pH: x  Gluc: x / Ketone: Negative mg/dL  / Bili: Negative / Urobili: 0.2 mg/dL   Blood: x / Protein: Negative mg/dL / Nitrite: Positive   Leuk Esterase: Small / RBC: 1 /HPF / WBC 10 /HPF   Sq Epi: x / Non Sq Epi: 1 /HPF / Bacteria: Many /HPF    Consultant(s) Notes Reviewed: Wound Care    Care Discussed with Consultants/Other Providers: PREETI Patrick    MEDICATIONS  (STANDING):  ascorbic acid 500 milliGRAM(s) Oral daily  cefTRIAXone   IVPB 1000 milliGRAM(s) IV Intermittent every 24 hours  citalopram 10 milliGRAM(s) Oral daily  cyanocobalamin 1000 MICROGram(s) Oral daily  enoxaparin Injectable 40 milliGRAM(s) SubCutaneous every 24 hours  ketorolac   Injectable 15 milliGRAM(s) IV Push once  metoprolol succinate ER 25 milliGRAM(s) Oral daily  sodium chloride 0.9%. 1000 milliLiter(s) (50 mL/Hr) IV Continuous <Continuous>    MEDICATIONS  (PRN):  acetaminophen     Tablet .. 650 milliGRAM(s) Oral every 6 hours PRN Temp greater or equal to 38C (100.4F), Mild Pain (1 - 3)

## 2025-03-05 NOTE — PROGRESS NOTE ADULT - PROBLEM SELECTOR PLAN 2
- As noted above, CT findings of compression deformities of T11, L1, L4, L5  - XR LS as reported: Redemonstrated severe T12, moderate to severe L4, mild to moderate L3, and mild T11 superior endplate compression fracture deformities without gross progressive collapse. Remaining visualized vertebral body heights maintained. Redemonstrated multilevel degenerative changes including lower lumbar predominant posterior facet arthrosis. Partially visualized right total hip prosthesis and left hip fracture fixation hardware. Generalized osteopenia otherwise no discrete lytic or blastic lesions.  - Orthopedics following - As noted above, CT findings of compression deformities of T11, L1, L4, L5  - XR LS as reported: Redemonstrated severe T12, moderate to severe L4, mild to moderate L3, and mild T11 superior endplate compression fracture deformities without gross progressive collapse. Remaining visualized vertebral body heights maintained. Redemonstrated multilevel degenerative changes including lower lumbar predominant posterior facet arthrosis. Partially visualized right total hip prosthesis and left hip fracture fixation hardware. Generalized osteopenia otherwise no discrete lytic or blastic lesions.  - Orthopedics recommendations appreciated: Pain control; WBAT with assistive devices as needed; TLSO for comfort   medical management per primary team; No acute orthopaedic surgical intervention indicated at this time. This patient is orthopaedically stable for discharge. Patient to follow up with Dr. Palm as an outpatient for further evaluation and management.

## 2025-03-05 NOTE — PHYSICAL THERAPY INITIAL EVALUATION ADULT - ORIENTATION, REHAB EVAL
person Suturegard Body: The suture ends were repeatedly re-tightened and re-clamped to achieve the desired tissue expansion.

## 2025-03-05 NOTE — PHYSICAL THERAPY INITIAL EVALUATION ADULT - ADDITIONAL COMMENTS
Per chart Pt admitted from [facility]. Pt unable to provide hx. Call placed to pt's son-no answer. To f/u.

## 2025-03-05 NOTE — PROGRESS NOTE ADULT - PROBLEM SELECTOR PLAN 4
- At baseline incontinent of b/b per facility staff  - UA positive, given dose of CTX in ED, c/w same for now in setting of dementia  - F/u UCx - At baseline incontinent of b/b per facility staff  - UA positive, given dose of CTX in ED, c/w same for now in setting of dementia - complete 5 day course  - F/u UCx - EColi

## 2025-03-05 NOTE — PROGRESS NOTE ADULT - PROBLEM SELECTOR PLAN 1
- Per facility RN, mostly bedbound, was in normal state of health night prior, found on floor by staff this AM on side of bed without rail  - With contusion/lac to forehead s/p repair in ED  - CTH/C spine negative for acute bleed or dislocation  - CK WNL, no signs of rhabdo on labs   - XRay of b/l shoulders, XR R hip/femur/knee with no new fx, does have advanced OA and chronic fx  - CT chest/abd did note no acute intrathoracic findings, age-indeterminate mild loss of vertebral body height at L3 since 2023. Compression deformities of T11, L1, L4, L5.   - XR LS as reported: Redemonstrated severe T12, moderate to severe L4, mild to moderate L3, and mild T11 superior endplate compression fracture deformities without gross progressive collapse. Remaining visualized vertebral body heights maintained. Redemonstrated multilevel degenerative changes including lower lumbar predominant posterior facet arthrosis. Partially visualized right total hip prosthesis and left hip fracture fixation hardware. Generalized osteopenia otherwise no discrete lytic or blastic lesions.  - Ortho recommendations appreciated: "89y Female with chronic Compression deformities of T11, L1, L4, and L5 and new L3 VCF; Pain control; WBAT with assistive devices as needed; TLSO for comfort; medical management per primary team; no acute orthopaedic surgical intervention indicated at this time. This patient is orthopaedically stable for discharge.   - Pain control for now with tylenol, given one time dose of Toradol (upon PO, can transition to low-dose tramadol as needed) - Per facility RN, mostly bedbound, was in normal state of health night prior, found on floor by staff this AM on side of bed without rail  - With contusion/lac to forehead s/p repair in ED  - CTH/C spine negative for acute bleed or dislocation  - CK WNL, no signs of rhabdo on labs   - XRay of b/l shoulders, XR R hip/femur/knee with no new fx, does have advanced OA and chronic fx  - CT chest/abd did note no acute intrathoracic findings, age-indeterminate mild loss of vertebral body height at L3 since 2023. Compression deformities of T11, L1, L4, L5.   - XR LS as reported: Redemonstrated severe T12, moderate to severe L4, mild to moderate L3, and mild T11 superior endplate compression fracture deformities without gross progressive collapse. Remaining visualized vertebral body heights maintained. Redemonstrated multilevel degenerative changes including lower lumbar predominant posterior facet arthrosis. Partially visualized right total hip prosthesis and left hip fracture fixation hardware. Generalized osteopenia otherwise no discrete lytic or blastic lesions.  - Ortho recommendations appreciated: "89y Female with chronic Compression deformities of T11, L1, L4, and L5 and new L3 VCF; Pain control; WBAT with assistive devices as needed; TLSO for comfort; medical management per primary team; no acute orthopaedic surgical intervention indicated at this time. This patient is orthopaedically stable for discharge.   - Conservative analgesia regimen PRN

## 2025-03-06 LAB
ALBUMIN SERPL ELPH-MCNC: 3.6 G/DL — SIGNIFICANT CHANGE UP (ref 3.3–5)
ALP SERPL-CCNC: 92 U/L — SIGNIFICANT CHANGE UP (ref 40–120)
ALT FLD-CCNC: 19 U/L — SIGNIFICANT CHANGE UP (ref 10–45)
ANION GAP SERPL CALC-SCNC: 13 MMOL/L — SIGNIFICANT CHANGE UP (ref 5–17)
AST SERPL-CCNC: 11 U/L — SIGNIFICANT CHANGE UP (ref 10–40)
BASOPHILS # BLD AUTO: 0.01 K/UL — SIGNIFICANT CHANGE UP (ref 0–0.2)
BASOPHILS NFR BLD AUTO: 0.3 % — SIGNIFICANT CHANGE UP (ref 0–2)
BILIRUB SERPL-MCNC: 0.6 MG/DL — SIGNIFICANT CHANGE UP (ref 0.2–1.2)
BUN SERPL-MCNC: 18 MG/DL — SIGNIFICANT CHANGE UP (ref 7–23)
CALCIUM SERPL-MCNC: 8.8 MG/DL — SIGNIFICANT CHANGE UP (ref 8.4–10.5)
CHLORIDE SERPL-SCNC: 108 MMOL/L — SIGNIFICANT CHANGE UP (ref 96–108)
CO2 SERPL-SCNC: 23 MMOL/L — SIGNIFICANT CHANGE UP (ref 22–31)
CREAT SERPL-MCNC: 0.56 MG/DL — SIGNIFICANT CHANGE UP (ref 0.5–1.3)
EGFR: 88 ML/MIN/1.73M2 — SIGNIFICANT CHANGE UP
EGFR: 88 ML/MIN/1.73M2 — SIGNIFICANT CHANGE UP
EOSINOPHIL # BLD AUTO: 0.04 K/UL — SIGNIFICANT CHANGE UP (ref 0–0.5)
EOSINOPHIL NFR BLD AUTO: 1.1 % — SIGNIFICANT CHANGE UP (ref 0–6)
GLUCOSE SERPL-MCNC: 103 MG/DL — HIGH (ref 70–99)
HCT VFR BLD CALC: 37.1 % — SIGNIFICANT CHANGE UP (ref 34.5–45)
HGB BLD-MCNC: 12 G/DL — SIGNIFICANT CHANGE UP (ref 11.5–15.5)
IMM GRANULOCYTES NFR BLD AUTO: 0.3 % — SIGNIFICANT CHANGE UP (ref 0–0.9)
LYMPHOCYTES # BLD AUTO: 0.69 K/UL — LOW (ref 1–3.3)
LYMPHOCYTES # BLD AUTO: 19.1 % — SIGNIFICANT CHANGE UP (ref 13–44)
MAGNESIUM SERPL-MCNC: 2 MG/DL — SIGNIFICANT CHANGE UP (ref 1.6–2.6)
MCHC RBC-ENTMCNC: 30.8 PG — SIGNIFICANT CHANGE UP (ref 27–34)
MCHC RBC-ENTMCNC: 32.3 G/DL — SIGNIFICANT CHANGE UP (ref 32–36)
MCV RBC AUTO: 95.4 FL — SIGNIFICANT CHANGE UP (ref 80–100)
MONOCYTES # BLD AUTO: 0.41 K/UL — SIGNIFICANT CHANGE UP (ref 0–0.9)
MONOCYTES NFR BLD AUTO: 11.3 % — SIGNIFICANT CHANGE UP (ref 2–14)
NEUTROPHILS # BLD AUTO: 2.46 K/UL — SIGNIFICANT CHANGE UP (ref 1.8–7.4)
NEUTROPHILS NFR BLD AUTO: 67.9 % — SIGNIFICANT CHANGE UP (ref 43–77)
NRBC BLD AUTO-RTO: 0 /100 WBCS — SIGNIFICANT CHANGE UP (ref 0–0)
PLATELET # BLD AUTO: 151 K/UL — SIGNIFICANT CHANGE UP (ref 150–400)
POTASSIUM SERPL-MCNC: 3.7 MMOL/L — SIGNIFICANT CHANGE UP (ref 3.5–5.3)
POTASSIUM SERPL-SCNC: 3.7 MMOL/L — SIGNIFICANT CHANGE UP (ref 3.5–5.3)
PROT SERPL-MCNC: 6.8 G/DL — SIGNIFICANT CHANGE UP (ref 6–8.3)
RBC # BLD: 3.89 M/UL — SIGNIFICANT CHANGE UP (ref 3.8–5.2)
RBC # FLD: 13.2 % — SIGNIFICANT CHANGE UP (ref 10.3–14.5)
SODIUM SERPL-SCNC: 144 MMOL/L — SIGNIFICANT CHANGE UP (ref 135–145)
WBC # BLD: 3.62 K/UL — LOW (ref 3.8–10.5)
WBC # FLD AUTO: 3.62 K/UL — LOW (ref 3.8–10.5)

## 2025-03-06 PROCEDURE — 99233 SBSQ HOSP IP/OBS HIGH 50: CPT

## 2025-03-06 RX ORDER — NYSTATIN 100000 [USP'U]/G
1 CREAM TOPICAL
Refills: 0 | Status: DISCONTINUED | OUTPATIENT
Start: 2025-03-06 | End: 2025-03-10

## 2025-03-06 RX ORDER — NYSTATIN 100000 [USP'U]/G
1 CREAM TOPICAL
Refills: 0 | Status: DISCONTINUED | OUTPATIENT
Start: 2025-03-06 | End: 2025-03-06

## 2025-03-06 RX ADMIN — Medication 25 GRAM(S): at 21:20

## 2025-03-06 RX ADMIN — NYSTATIN 1 APPLICATION(S): 100000 CREAM TOPICAL at 17:16

## 2025-03-06 RX ADMIN — CITALOPRAM 10 MILLIGRAM(S): 20 TABLET ORAL at 13:02

## 2025-03-06 RX ADMIN — Medication 25 GRAM(S): at 13:02

## 2025-03-06 RX ADMIN — Medication 25 GRAM(S): at 05:11

## 2025-03-06 RX ADMIN — METOPROLOL SUCCINATE 25 MILLIGRAM(S): 50 TABLET, EXTENDED RELEASE ORAL at 05:12

## 2025-03-06 RX ADMIN — CYANOCOBALAMIN 1000 MICROGRAM(S): 1000 INJECTION INTRAMUSCULAR; SUBCUTANEOUS at 13:02

## 2025-03-06 RX ADMIN — ENOXAPARIN SODIUM 40 MILLIGRAM(S): 100 INJECTION SUBCUTANEOUS at 17:16

## 2025-03-06 RX ADMIN — Medication 500 MILLIGRAM(S): at 13:02

## 2025-03-06 NOTE — DIETITIAN INITIAL EVALUATION ADULT - PERTINENT LABORATORY DATA
03-06    144  |  108  |  18  ----------------------------<  103[H]  3.7   |  23  |  0.56    Ca    8.8      06 Mar 2025 09:09  Mg     2.0     03-06    TPro  6.8  /  Alb  3.6  /  TBili  0.6  /  DBili  x   /  AST  11  /  ALT  19  /  AlkPhos  92  03-06

## 2025-03-06 NOTE — PROGRESS NOTE ADULT - PROBLEM SELECTOR PLAN 6
- DVT ppx: lovenox  - Code status: DNR/DNI, no feeding tube per [facility] faxed over from facility  - Son Yuri is HCP, updated via phone on admission - is traveling [place] at this time, asks that we call his wife Philly's phone at [phone] with updates -- called on 3/4, 3/5, and 3/6 left [facility] with callback number

## 2025-03-06 NOTE — DIETITIAN INITIAL EVALUATION ADULT - PERTINENT MEDS FT
MEDICATIONS  (STANDING):  ascorbic acid 500 milliGRAM(s) Oral daily  citalopram 10 milliGRAM(s) Oral daily  cyanocobalamin 1000 MICROGram(s) Oral daily  enoxaparin Injectable 40 milliGRAM(s) SubCutaneous every 24 hours  ketorolac   Injectable 15 milliGRAM(s) IV Push once  metoprolol succinate ER 25 milliGRAM(s) Oral daily  nystatin Powder 1 Application(s) Topical two times a day  piperacillin/tazobactam IVPB.. 3.375 Gram(s) IV Intermittent every 8 hours  sodium chloride 0.9%. 1000 milliLiter(s) (50 mL/Hr) IV Continuous <Continuous>    MEDICATIONS  (PRN):  acetaminophen     Tablet .. 650 milliGRAM(s) Oral every 6 hours PRN Temp greater or equal to 38C (100.4F), Mild Pain (1 - 3)

## 2025-03-06 NOTE — DIETITIAN INITIAL EVALUATION ADULT - NSFNSPHYEXAMSKINFT_GEN_A_CORE
skin: per wound care note (3/4) pt noted with  B/L Buttocks/ Sacrum blanchable erythema and intact skin however deep tissue injury cannot be ruled out.

## 2025-03-06 NOTE — DIETITIAN INITIAL EVALUATION ADULT - PROBLEM SELECTOR PLAN 1
- Per facility RN, mostly bedbound, was in normal state of health night prior, found on floor by staff this AM on side of bed without rail  - With contusion/lac to forehead s/p repair in ED  - CTH/C spine negative for acute bleed or dislocation  - CK WNL, no signs of rhabdo on labs   - XRay of b/l shoulders, XR R hip/femur/knee with no new fx, does have advanced OA and chronic fx  - CT chest/abd did note no acute intrathoracic findings, age-indeterminate mild loss of vertebral body height at L3 since 2023. Compression deformities of T11, L1, L4, L5.   - Ortho consulted in ED, PT eval pending ortho recs - XR LS ordered and pending at this time   - Pain control for now with tylenol, given one time dose of Toradol (upon PO, can transition to low-dose tramadol as needed)

## 2025-03-06 NOTE — DIETITIAN INITIAL EVALUATION ADULT - ORAL INTAKE PTA/DIET HISTORY
Received the following PCP recommendation from Dr. Lara,    \"Aicha Lara, DO  to Me          10/2/20 3:56 PM  May have video visit for PHF only.\"    Will forward message to Clinical support staff to assist scheduling Virtual Video PHF appt per PCP recommendation as PCP fully booked at this time.     Pt with dementia, unable to participate in interview. Per chart pt resides at assisted living facility, was in normal state of health. Admitted s/p fall. Per facility transfer records, pt was on a soft chew diet. NKFA per chart. No reports of swallowing difficulty, nausea, vomiting, diarrhea, constipation per chart.

## 2025-03-06 NOTE — PROGRESS NOTE ADULT - PROBLEM SELECTOR PLAN 4
- At baseline incontinent of b/b per facility staff  - UA positive, given dose of CTX in ED  - F/u UCx - EColi - ESBL Ecoli -- changed Abx to IV Zosyn  - monitor CBC

## 2025-03-06 NOTE — DIETITIAN INITIAL EVALUATION ADULT - RD TO REMAIN AVAILABLE
Iris Hendricks RD, CDN, [facility] [place], Available on Teams/yes

## 2025-03-06 NOTE — PROGRESS NOTE ADULT - PROBLEM SELECTOR PLAN 2
- As noted above, CT findings of compression deformities of T11, L1, L4, L5  - XR LS as reported: Redemonstrated severe T12, moderate to severe L4, mild to moderate L3, and mild T11 superior endplate compression fracture deformities without gross progressive collapse. Remaining visualized vertebral body heights maintained. Redemonstrated multilevel degenerative changes including lower lumbar predominant posterior facet arthrosis. Partially visualized right total hip prosthesis and left hip fracture fixation hardware. Generalized osteopenia otherwise no discrete lytic or blastic lesions.  - Orthopedics recommendations appreciated: Pain control; WBAT with assistive devices as needed; TLSO for comfort   medical management per primary team; No acute orthopaedic surgical intervention indicated at this time. This patient is orthopaedically stable for discharge. Patient to follow up with Dr. Palm as an outpatient for further evaluation and management.

## 2025-03-06 NOTE — PROGRESS NOTE ADULT - SUBJECTIVE AND OBJECTIVE BOX
Gogo Colón M.D.  Division of Hospital Medicine  Available on [employer] TE[employer]    SUBJECTIVE / ACUTE INTERVAL EVENTS: Patient seen and examined. No overnight events. No subjective complaints. UCx returns growing ESBL Ecoli -- changed Abx to IV Zosyn -- monitor fever curve closely and f/u AM labs to determine need to send BCx given axillary temp overnight to 100.4F.      OBJECTIVE:   Vital Signs Last 24 Hrs  T(F): 99.3 (06 Mar 2025 12:17), Max: 100.1 (05 Mar 2025 20:44)  HR: 83 (06 Mar 2025 12:17) (78 - 100)  BP: 118/78 (06 Mar 2025 12:17) (110/74 - 130/74)  RR: 18 (06 Mar 2025 12:17) (18 - 18)  SpO2: 95% (06 Mar 2025 12:17) (94% - 95%)  Parameters below as of 06 Mar 2025 12:17  Patient On (Oxygen Delivery Method): room air    I&O's Summary  05 Mar 2025 07:01  -  06 Mar 2025 07:00  --------------------------------------------------------  IN: 0 mL / OUT: 350 mL / NET: -350 mL    Physical Examination:  GEN: elderly woman, laying in bed in NAD  PSYCH: A&Ox1, mood and affect appear appropriate   NEURO: no focal neurologic deficits appreciated  RESPI: no accessory muscle use, B/L air entry   CARDIO: regular rate/rhythm, no LE edema B/L  ABD: soft, NT, ND, +BS  EXT: patient able to move all extremities spontaneously  VASC: peripheral pulses palpated    Labs:                      12.0   3.62  )-----------( 151      ( 06 Mar 2025 09:09 )             37.1     03-06  144  |  108  |  18  ----------------------------<  103[H]  3.7   |  23  |  0.56    Ca    8.8      06 Mar 2025 09:09  Mg     2.0     03-06    TPro  6.8  /  Alb  3.6  /  TBili  0.6  /  DBili  x   /  AST  11  /  ALT  19  /  AlkPhos  92  03-06    Urinalysis Basic - ( 06 Mar 2025 09:09 )  Color: x / Appearance: x / SG: x / pH: x  Gluc: 103 mg/dL / Ketone: x  / Bili: x / Urobili: x   Blood: x / Protein: x / Nitrite: x   Leuk Esterase: x / RBC: x / WBC x   Sq Epi: x / Non Sq Epi: x / Bacteria: x    Consultant(s) Notes Reviewed: PT, Care Coordination    Care Discussed with Consultants/Other Providers: PREETI Milian    MEDICATIONS  (STANDING):  ascorbic acid 500 milliGRAM(s) Oral daily  citalopram 10 milliGRAM(s) Oral daily  cyanocobalamin 1000 MICROGram(s) Oral daily  enoxaparin Injectable 40 milliGRAM(s) SubCutaneous every 24 hours  ketorolac   Injectable 15 milliGRAM(s) IV Push once  metoprolol succinate ER 25 milliGRAM(s) Oral daily  nystatin Powder 1 Application(s) Topical two times a day  piperacillin/tazobactam IVPB.. 3.375 Gram(s) IV Intermittent every 8 hours  sodium chloride 0.9%. 1000 milliLiter(s) (50 mL/Hr) IV Continuous <Continuous>    MEDICATIONS  (PRN):  acetaminophen     Tablet .. 650 milliGRAM(s) Oral every 6 hours PRN Temp greater or equal to 38C (100.4F), Mild Pain (1 - 3)

## 2025-03-06 NOTE — PROGRESS NOTE ADULT - PROBLEM SELECTOR PLAN 1
- Per facility RN, mostly bedbound, was in normal state of health night prior, found on floor by staff this AM on side of bed without rail  - With contusion/lac to forehead s/p repair in ED  - CTH/C spine negative for acute bleed or dislocation  - CK WNL, no signs of rhabdo on labs   - XRay of b/l shoulders, XR R hip/femur/knee with no new fx, does have advanced OA and chronic fx  - CT chest/abd did note no acute intrathoracic findings, age-indeterminate mild loss of vertebral body height at L3 since 2023. Compression deformities of T11, L1, L4, L5.   - XR LS as reported: Redemonstrated severe T12, moderate to severe L4, mild to moderate L3, and mild T11 superior endplate compression fracture deformities without gross progressive collapse. Remaining visualized vertebral body heights maintained. Redemonstrated multilevel degenerative changes including lower lumbar predominant posterior facet arthrosis. Partially visualized right total hip prosthesis and left hip fracture fixation hardware. Generalized osteopenia otherwise no discrete lytic or blastic lesions.  - Ortho recommendations appreciated: "89y Female with chronic Compression deformities of T11, L1, L4, and L5 and new L3 VCF; Pain control; WBAT with assistive devices as needed; TLSO for comfort; medical management per primary team; no acute orthopaedic surgical intervention indicated at this time. This patient is orthopaedically stable for discharge.   - Conservative analgesia regimen PRN

## 2025-03-06 NOTE — DIETITIAN INITIAL EVALUATION ADULT - REASON FOR ADMISSION
Unspecified fall, initial encounter    Chart reviewed, events noted. This is a "89yoF w/ PMHx of Alzheimer's disease, HTN, MDD who presents from [facility] after unwitnessed fall."

## 2025-03-06 NOTE — DIETITIAN INITIAL EVALUATION ADULT - OTHER INFO
Weight: per previous RD note weight was 100lbs (5/10/23). 130lbs (7/15/24).  Unable to load [facility] HI for additional weights.  Current dosing weight is 134.6lbs-- pt appears with ~ 34lbs weight gain x ~ 2 years, will continue to monitor.

## 2025-03-06 NOTE — DIETITIAN INITIAL EVALUATION ADULT - ENERGY INTAKE
Adequate (%) In-house pt on easy to chew diet, consumed % of meal per nursing flow sheets. No acute GI distress noted. Orthopedically stable for discharge per chart.

## 2025-03-06 NOTE — DIETITIAN INITIAL EVALUATION ADULT - ADD RECOMMEND
1) Defer diet texture/consistency to team/ Speech Language Pathologist 2) RD to add Mighty shakes TID daily to supplement PO intake. 3) Continue  ascorbic acid daily. Recommend addition of multivitamin once daily if no contraindications.  4) RD to remain available and follow-up as medically appropriate.

## 2025-03-06 NOTE — DIETITIAN INITIAL EVALUATION ADULT - PROBLEM SELECTOR PLAN 6
- DVT ppx: lovenox  - Diet: pending speech eval  - Code status: DNR/DNI, no feeding tube per [facility] faxed over from facility  - Son Yuri is HCP, updated via phone - is traveling abroad at this time, asks that we call his wife Philly's phone at [phone] with updates  - Discussed with ACP

## 2025-03-07 PROCEDURE — 99232 SBSQ HOSP IP/OBS MODERATE 35: CPT

## 2025-03-07 RX ADMIN — METOPROLOL SUCCINATE 25 MILLIGRAM(S): 50 TABLET, EXTENDED RELEASE ORAL at 06:26

## 2025-03-07 RX ADMIN — CITALOPRAM 10 MILLIGRAM(S): 20 TABLET ORAL at 11:18

## 2025-03-07 RX ADMIN — ENOXAPARIN SODIUM 40 MILLIGRAM(S): 100 INJECTION SUBCUTANEOUS at 16:33

## 2025-03-07 RX ADMIN — Medication 25 GRAM(S): at 21:00

## 2025-03-07 RX ADMIN — NYSTATIN 1 APPLICATION(S): 100000 CREAM TOPICAL at 17:04

## 2025-03-07 RX ADMIN — NYSTATIN 1 APPLICATION(S): 100000 CREAM TOPICAL at 06:00

## 2025-03-07 RX ADMIN — Medication 25 GRAM(S): at 13:00

## 2025-03-07 RX ADMIN — Medication 500 MILLIGRAM(S): at 11:18

## 2025-03-07 RX ADMIN — CYANOCOBALAMIN 1000 MICROGRAM(S): 1000 INJECTION INTRAMUSCULAR; SUBCUTANEOUS at 11:18

## 2025-03-07 RX ADMIN — Medication 25 GRAM(S): at 06:26

## 2025-03-07 NOTE — PROGRESS NOTE ADULT - SUBJECTIVE AND OBJECTIVE BOX
Gogo Colón M.D.  Division of Hospital Medicine  Available on [employer] TEAMS    SUBJECTIVE / ACUTE INTERVAL EVENTS: Patient seen and examined. No overnight events. No subjective complaints. Tomorrow will complete 3days of IV Zosyn. Attempting to reach family to discuss clinical course and hopeful plan for discharge back to [facility] this weekend.      OBJECTIVE:   Vital Signs Last 24 Hrs  T(F): 98.6 (07 Mar 2025 11:24), Max: 99.3 (06 Mar 2025 12:17)  HR: 80 (07 Mar 2025 11:24) (80 - 98)  BP: 108/72 (07 Mar 2025 11:24) (108/72 - 121/76)  RR: 18 (07 Mar 2025 11:24) (18 - 18)  SpO2: 94% (07 Mar 2025 11:24) (93% - 95%)  Parameters below as of 07 Mar 2025 11:24  Patient On (Oxygen Delivery Method): room air    Physical Examination:  GEN: elderly woman, laying in bed in NAD  PSYCH: A&Ox1, mood and affect appear appropriate   NEURO: no focal neurologic deficits appreciated  RESPI: no accessory muscle use, B/L air entry   CARDIO: regular rate/rhythm, no LE edema B/L  ABD: soft, NT, ND, +BS  EXT: patient able to move all extremities spontaneously  VASC: peripheral pulses palpated    Labs:                     12.0   3.62  )-----------( 151      ( 06 Mar 2025 09:09 )             37.1     03-06  144  |  108  |  18  ----------------------------<  103[H]  3.7   |  23  |  0.56    Ca    8.8      06 Mar 2025 09:09  Mg     2.0     03-06    TPro  6.8  /  Alb  3.6  /  TBili  0.6  /  DBili  x   /  AST  11  /  ALT  19  /  AlkPhos  92  03-06    Urinalysis Basic - ( 06 Mar 2025 09:09 )  Color: x / Appearance: x / SG: x / pH: x  Gluc: 103 mg/dL / Ketone: x  / Bili: x / Urobili: x   Blood: x / Protein: x / Nitrite: x   Leuk Esterase: x / RBC: x / WBC x   Sq Epi: x / Non Sq Epi: x / Bacteria: x    Care Discussed with Consultants/Other Providers: PREETI Milian    MEDICATIONS  (STANDING):  ascorbic acid 500 milliGRAM(s) Oral daily  citalopram 10 milliGRAM(s) Oral daily  cyanocobalamin 1000 MICROGram(s) Oral daily  enoxaparin Injectable 40 milliGRAM(s) SubCutaneous every 24 hours  ketorolac   Injectable 15 milliGRAM(s) IV Push once  metoprolol succinate ER 25 milliGRAM(s) Oral daily  nystatin Powder 1 Application(s) Topical two times a day  piperacillin/tazobactam IVPB.. 3.375 Gram(s) IV Intermittent every 8 hours  sodium chloride 0.9%. 1000 milliLiter(s) (50 mL/Hr) IV Continuous <Continuous>    MEDICATIONS  (PRN):  acetaminophen     Tablet .. 650 milliGRAM(s) Oral every 6 hours PRN Temp greater or equal to 38C (100.4F), Mild Pain (1 - 3)

## 2025-03-07 NOTE — DISCHARGE NOTE PROVIDER - NSDCFUADDAPPT_GEN_ALL_CORE_FT
APPTS ARE READY TO BE MADE: [X] YES    Best Family or Patient Contact (if needed):    Additional Information about above appointments (if needed):    1: Orthopedics  2:   3:     Other comments or requests:    APPTS ARE READY TO BE MADE: [X] YES    Best Family or Patient Contact (if needed):    Additional Information about above appointments (if needed):    1: Orthopedics  2:   3:     Other comments or requests:     Patient is being transferred. Caregiver will arrange follow up.

## 2025-03-07 NOTE — DISCHARGE NOTE PROVIDER - HOSPITAL COURSE
History of Presenting Illness: 89 y.o. F with pmhx notable for Alzheimer's disease, HTN, MDD who presents from [facility] [place] for fall. Per facility caretaker, Nick, pt was in normal state of health yesterday evening, last seen around 8 PM. Notes this AM, was found on floor in her room along side of bed without guard-rail, with hematoma/bleeding to L forehead. Unclear mechanism of fall, unknown duration of 'down-time'. Per staff, at baseline pt very docile, oriented to self at baseline, says a few words but doesn't partake in prolonged conversation. Mostly bed-bound, incontinent of b/b in depends. Upon arrival pt hypertensive w/BP up to 189/104 for which pt given 5 mg hydralazine IV but with remainder of VSS stable. With sutures applied to L forehead lesion in ED. Labs largely unremarkable, CT and Xray imaging noted for lumbar compression fx for which ortho was consulted. Medicine called for admission. (03 Mar 2025 14:55)    Hospital Course:  - Per facility RN, mostly bedbound, was in normal state of health night prior, found on floor by staff this AM on side of bed without rail  - With contusion/lac to forehead s/p repair in ED  - CTH/C spine negative for acute bleed or dislocation  - CK WNL, no signs of rhabdo on labs   - XRay of b/l shoulders, XR R hip/femur/knee with no new fx, does have advanced OA and chronic fx  - CT chest/abd did note no acute intrathoracic findings, age-indeterminate mild loss of vertebral body height at L3 since 2023. Compression deformities of T11, L1, L4, L5.   - XR LS as reported: Redemonstrated severe T12, moderate to severe L4, mild to moderate L3, and mild T11 superior endplate compression fracture deformities without gross progressive collapse. Remaining visualized vertebral body heights maintained. Redemonstrated multilevel degenerative changes including lower lumbar predominant posterior facet arthrosis. Partially visualized right total hip prosthesis and left hip fracture fixation hardware. Generalized osteopenia otherwise no discrete lytic or blastic lesions.  - Ortho recommendations appreciated: "89y Female with chronic Compression deformities of T11, L1, L4, and L5 and new L3 VCF; Pain control; WBAT with assistive devices as needed; TLSO for comfort; medical management per primary team; no acute orthopaedic surgical intervention indicated at this time. This patient is orthopaedically stable for discharge.   - Conservative analgesia regimen PRN.  - As noted above, CT findings of compression deformities of T11, L1, L4, L5  - XR LS as reported: Redemonstrated severe T12, moderate to severe L4, mild to moderate L3, and mild T11 superior endplate compression fracture deformities without gross progressive collapse. Remaining visualized vertebral body heights maintained. Redemonstrated multilevel degenerative changes including lower lumbar predominant posterior facet arthrosis. Partially visualized right total hip prosthesis and left hip fracture fixation hardware. Generalized osteopenia otherwise no discrete lytic or blastic lesions.  - Orthopedics recommendations appreciated: Pain control; WBAT with assistive devices as needed; TLSO for comfort   medical management per primary team; No acute orthopaedic surgical intervention indicated at this time. This patient is orthopaedically stable for discharge. Patient to follow up with Dr. Palm as an outpatient for further evaluation and management.  - At baseline, docile, oriented to self, answers some questions/has short word sentences but does not partake in prolonged conversation, on regular consistency, easy/soft to chew texture diet  - Per staff, no longer on donepezil  - Per ED RN, pt failed dysphagia screen, unable to follow commands, or even take sips of water  - Speech/swallow evaluation.  - At baseline incontinent of b/b per facility staff  - UA positive, given dose of CTX in ED  - UCx - EColi - ESBL Ecoli -- changed Abx to IV Zosyn  - monitor CBC.  - Continue metop succinate 25 mg daily.  - DVT ppx: lovenox  - Code status: DNR/DNI, no feeding tube per [facility] faxed over from facility  - Son Yuri is HCP, updated via phone on admission - is traveling abroad at this time, asks that we call his wife Philly's phone at [phone] with updates -- called on 3/4, 3/5, and 3[place]6 [place] [facility] [place].    Important Medication Changes and Reason:    Active or Pending Issues Requiring Follow-up:    Advanced Directives:   [ ] Full code  [X] DNR  [ ] Hospice    Discharge Diagnoses:  Unwitnessed fall.   Compression deformity of vertebra.  Acute UTI (urinary tract infection)  Alzheimer disease.        History of Presenting Illness: 89 y.o. F with PMHx notable for Alzheimer's disease, HTN, MDD who presents from [facility] [place] for fall. Per facility caretaker, Nick, pt was in normal state of health yesterday evening, last seen around 8 PM. Notes this AM, was found on floor in her room along side of bed without guard-rail, with hematoma/bleeding to L forehead. Unclear mechanism of fall, unknown duration of 'down-time'. Per staff, at baseline pt very docile, oriented to self at baseline, says a few words but doesn't partake in prolonged conversation. Mostly non-ambulatory, incontinent of b/b in Depends. Upon arrival pt hypertensive w/BP up to 189/104 for which pt given 5 mg hydralazine IV but with remainder of VSS stable. With sutures applied to L forehead lesion in ED. Labs largely unremarkable, CT and Xray imaging noted for lumbar compression fx for which ortho was consulted. Medicine called for admission. (03 Mar 2025 14:55)    Hospital Course:  Patient presented s/p fall   - With contusion/laceration to forehead s/p repair in ED  - CTH/C spine negative for acute bleed or dislocation  - CK WNL, no signs of rhabdomyolysis on labs   - XRay of b/l shoulders, XR R hip/femur/knee with no new fx, does have advanced OA and chronic fx  - CT chest/abd did note no acute intrathoracic findings, age-indeterminate mild loss of vertebral body height at L3 since 2023. Compression deformities of T11, L1, L4, L5.   - XR LS as reported: Redemonstrated severe T12, moderate to severe L4, mild to moderate L3, and mild T11 superior endplate compression fracture deformities without gross progressive collapse. Remaining visualized vertebral body heights maintained. Redemonstrated multilevel degenerative changes including lower lumbar predominant posterior facet arthrosis. Partially visualized right total hip prosthesis and left hip fracture fixation hardware. Generalized osteopenia otherwise no discrete lytic or blastic lesions.  - Ortho recommendations appreciated: "89y Female with chronic Compression deformities of T11, L1, L4, and L5 and new L3 VCF; Pain control; WBAT with assistive devices as needed; TLSO for comfort; medical management per primary team; no acute orthopaedic surgical intervention indicated at this time. This patient is orthopaedically stable for discharge." No acute orthopaedic surgical intervention indicated at this time. This patient is orthopaedically stable for discharge. Patient to follow up with Dr. Palm as an outpatient for further evaluation and management.  - Conservative analgesia regimen PRN.    - UA positive, given dose of CTX in ED  - UCx - EColi - ESBL Ecoli -- changed Abx to IV Zosyn. Completed 3 days of Zosyn.     Patient symptomatically improved and hemodynamically stable for discharge.    Important Medication Changes and Reason:  No changes to home medications    Active or Pending Issues Requiring Follow-up:  Follow-up with PCP and orthopedics    Advanced Directives:   [ ] Full code  [X] DNR  [ ] Hospice    Discharge Diagnoses:  Unwitnessed fall.   Compression deformity of vertebra.  Acute UTI (urinary tract infection)  Acute toxic metabolic encephalopathy 2/2 UTI  Alzheimer disease with behavioral disturbance

## 2025-03-07 NOTE — DISCHARGE NOTE PROVIDER - NSDCMRMEDTOKEN_GEN_ALL_CORE_FT
acetaminophen 325 mg oral tablet: 2 tab(s) orally 3 times a day  ascorbic acid 500 mg oral tablet: 1 tab(s) orally once a day  citalopram 10 mg oral tablet: 1 tab(s) orally once a day  cyanocobalamin 1000 mcg oral tablet: 1 tab(s) orally once a day  metoprolol succinate 25 mg oral tablet, extended release: 1 tab(s) orally once a day  Multivitamin Tablet: 1 tablet orally once a day  Systane 0.3%-0.4% Eye Drop: 1 drop instilled in each eye 3 times a day  TLSO: for comfort  TLSO brace: ALVA 99  S32.0  Ht 157 cm  Wt 61 kg   acetaminophen 325 mg oral tablet: 2 tab(s) orally 3 times a day  ascorbic acid 500 mg oral tablet: 1 tab(s) orally once a day  citalopram 10 mg oral tablet: 1 tab(s) orally once a day  cyanocobalamin 1000 mcg oral tablet: 1 tab(s) orally once a day  metoprolol succinate 25 mg oral tablet, extended release: 1 tab(s) orally once a day  Multivitamin Tablet: 1 tablet orally once a day  Systane 0.3%-0.4% Eye Drop: 1 drop instilled in each eye 3 times a day

## 2025-03-07 NOTE — DISCHARGE NOTE PROVIDER - ATTENDING DISCHARGE PHYSICAL EXAMINATION:
Vital Signs Last 24 Hrs  T(C): 36.9 (10 Mar 2025 04:40), Max: 36.9 (10 Mar 2025 04:40)  T(F): 98.4 (10 Mar 2025 04:40), Max: 98.4 (10 Mar 2025 04:40)  HR: 77 (10 Mar 2025 04:40) (76 - 87)  BP: 124/74 (10 Mar 2025 04:40) (124/74 - 141/83)  BP(mean): --  RR: 18 (10 Mar 2025 04:40) (18 - 18)  SpO2: 93% (10 Mar 2025 04:40) (93% - 95%)    Parameters below as of 10 Mar 2025 04:40  Patient On (Oxygen Delivery Method): room air        CONSTITUTIONAL: NAD  EYES: No conjunctival or scleral injection, non-icteric;   ENMT: No external nasal lesions; MMM  NECK: Trachea midline   RESPIRATORY: Breathing comfortably; lungs CTA without wheeze/rhonchi/rales  CARDIOVASCULAR: +S1S2, RRR, no lower extremity edema  GASTROINTESTINAL: No palpable masses or tenderness, no rebound/guarding  SKIN: Warm, dry  PSYCHIATRIC: Calm, cooperative

## 2025-03-07 NOTE — PROGRESS NOTE ADULT - PROBLEM SELECTOR PLAN 4
- At baseline incontinent of b/b per facility staff  - UA positive, given dose of CTX in ED  - F/u UCx - EColi - ESBL Ecoli -- changed Abx to IV Zosyn - complete 3days of IV Zosyn prior to D/C  - monitor CBC

## 2025-03-07 NOTE — PROGRESS NOTE ADULT - PROBLEM SELECTOR PLAN 6
- DVT ppx: lovenox  - Code status: DNR/DNI, no feeding tube per [facility] faxed over from facility  - Son Yuri is HCP, updated via phone on admission - is traveling abroad at this time, asks that we call his wife Philly's phone at [phone] with updates -- called on 3/4, 3/5, and 3/6 left [facility] with callback number

## 2025-03-07 NOTE — DISCHARGE NOTE PROVIDER - CARE PROVIDER_API CALL
Hector Palm  Spine Surgery  [address], [address]  [place] [address]  Phone: [phone]  Fax: [phone]  Follow Up Time: 2 weeks

## 2025-03-07 NOTE — DISCHARGE NOTE PROVIDER - NSDCCPCAREPLAN_GEN_ALL_CORE_FT
PRINCIPAL DISCHARGE DIAGNOSIS  Diagnosis: Fall  Assessment and Plan of Treatment: You were admitted due to fall. Imaging was significant for chronic compression deformities of T11, L1, L4, and L5 and new L3 vertebral compression fracture. You were evaluated by orthopedics - recommended weight bearing with assistive devices as needed, wearing TLSO while out of bed for comfort; no acute orthopedic surgical intervention needed. May take Tylenol as needed for pain.  Follow-up with Dr. Palm as an outpatient for further evaluation and management.  You were also noted to have a urinary tract infection. You were treated with intravenous antibiotics.   If any new or worsening symptoms, including fever, chest pain, shortness of breath, nausea, vomit, abdominal pain, seek immediate medical attention.

## 2025-03-08 LAB
CULTURE RESULTS: SIGNIFICANT CHANGE UP
CULTURE RESULTS: SIGNIFICANT CHANGE UP
SPECIMEN SOURCE: SIGNIFICANT CHANGE UP
SPECIMEN SOURCE: SIGNIFICANT CHANGE UP

## 2025-03-08 PROCEDURE — 99232 SBSQ HOSP IP/OBS MODERATE 35: CPT

## 2025-03-08 RX ADMIN — CITALOPRAM 10 MILLIGRAM(S): 20 TABLET ORAL at 12:30

## 2025-03-08 RX ADMIN — CYANOCOBALAMIN 1000 MICROGRAM(S): 1000 INJECTION INTRAMUSCULAR; SUBCUTANEOUS at 12:30

## 2025-03-08 RX ADMIN — NYSTATIN 1 APPLICATION(S): 100000 CREAM TOPICAL at 17:44

## 2025-03-08 RX ADMIN — ENOXAPARIN SODIUM 40 MILLIGRAM(S): 100 INJECTION SUBCUTANEOUS at 17:44

## 2025-03-08 RX ADMIN — NYSTATIN 1 APPLICATION(S): 100000 CREAM TOPICAL at 05:43

## 2025-03-08 RX ADMIN — METOPROLOL SUCCINATE 25 MILLIGRAM(S): 50 TABLET, EXTENDED RELEASE ORAL at 05:43

## 2025-03-08 RX ADMIN — Medication 25 GRAM(S): at 05:43

## 2025-03-08 RX ADMIN — Medication 25 GRAM(S): at 14:11

## 2025-03-08 RX ADMIN — Medication 500 MILLIGRAM(S): at 12:30

## 2025-03-08 RX ADMIN — Medication 25 GRAM(S): at 22:07

## 2025-03-08 NOTE — PROGRESS NOTE ADULT - SUBJECTIVE AND OBJECTIVE BOX
INCOMPLETE NOTE    Gogo Colón M.D.  Division of Hospital Medicine  Available on [employer] TEAMS    SUBJECTIVE / ACUTE INTERVAL EVENTS: Patient seen and examined. No overnight events. No subjective complaints.     OBJECTIVE:   Vital Signs Last 24 Hrs  T(F): 98.9 (08 Mar 2025 04:47), Max: 98.9 (08 Mar 2025 04:47)  HR: 87 (08 Mar 2025 04:47) (80 - 98)  BP: 133/82 (08 Mar 2025 04:47) (108/72 - 133/82)  RR: 18 (08 Mar 2025 04:47) (18 - 18)  SpO2: 92% (08 Mar 2025 04:47) (92% - 95%)  Parameters below as of 08 Mar 2025 04:47  Patient On (Oxygen Delivery Method): room air    Physical Examination:  GEN: elderly woman, laying in bed in NAD  PSYCH: A&Ox1, mood and affect appear appropriate   NEURO: no focal neurologic deficits appreciated  RESPI: no accessory muscle use, B/L air entry   CARDIO: regular rate/rhythm, no LE edema B/L  ABD: soft, NT, ND, +BS  EXT: patient able to move all extremities spontaneously  VASC: peripheral pulses palpated    Labs:                     12.0   3.62  )-----------( 151      ( 06 Mar 2025 09:09 )             37.1     03-06  144  |  108  |  18  ----------------------------<  103[H]  3.7   |  23  |  0.56    Ca    8.8      06 Mar 2025 09:09  Mg     2.0     03-06    TPro  6.8  /  Alb  3.6  /  TBili  0.6  /  DBili  x   /  AST  11  /  ALT  19  /  AlkPhos  92  03-06    Urinalysis Basic - ( 06 Mar 2025 09:09 )  Color: x / Appearance: x / SG: x / pH: x  Gluc: 103 mg/dL / Ketone: x  / Bili: x / Urobili: x   Blood: x / Protein: x / Nitrite: x   Leuk Esterase: x / RBC: x / WBC x   Sq Epi: x / Non Sq Epi: x / Bacteria: x    Consultant(s) Notes Reviewed: Care Coordination    Care Discussed with Consultants/Other Providers: PREETI Bose    MEDICATIONS  (STANDING):  ascorbic acid 500 milliGRAM(s) Oral daily  citalopram 10 milliGRAM(s) Oral daily  cyanocobalamin 1000 MICROGram(s) Oral daily  enoxaparin Injectable 40 milliGRAM(s) SubCutaneous every 24 hours  ketorolac   Injectable 15 milliGRAM(s) IV Push once  metoprolol succinate ER 25 milliGRAM(s) Oral daily  nystatin Powder 1 Application(s) Topical two times a day  piperacillin/tazobactam IVPB.. 3.375 Gram(s) IV Intermittent every 8 hours  sodium chloride 0.9%. 1000 milliLiter(s) (50 mL/Hr) IV Continuous <Continuous>    MEDICATIONS  (PRN):  acetaminophen     Tablet .. 650 milliGRAM(s) Oral every 6 hours PRN Temp greater or equal to 38C (100.4F), Mild Pain (1 - 3) Gogo Colón M.D.  Division of Hospital Medicine  Available on [employer] TEAMS    SUBJECTIVE / ACUTE INTERVAL EVENTS: Patient seen and examined. No overnight events. No subjective complaints. Abx course to complete today. Difficulty reaching family persists -- need to contact HCP prior to initiating discharge planning.     OBJECTIVE:   Vital Signs Last 24 Hrs  T(F): 98.9 (08 Mar 2025 04:47), Max: 98.9 (08 Mar 2025 04:47)  HR: 87 (08 Mar 2025 04:47) (80 - 98)  BP: 133/82 (08 Mar 2025 04:47) (108/72 - 133/82)  RR: 18 (08 Mar 2025 04:47) (18 - 18)  SpO2: 92% (08 Mar 2025 04:47) (92% - 95%)  Parameters below as of 08 Mar 2025 04:47  Patient On (Oxygen Delivery Method): room air    Physical Examination:  GEN: elderly woman, laying in bed in NAD  PSYCH: A&Ox1, mood and affect appear appropriate   NEURO: no focal neurologic deficits appreciated  RESPI: no accessory muscle use, B/L air entry   CARDIO: regular rate/rhythm, no LE edema B/L  ABD: soft, NT, ND, +BS  EXT: patient able to move all extremities spontaneously  VASC: peripheral pulses palpated    Labs:                     12.0   3.62  )-----------( 151      ( 06 Mar 2025 09:09 )             37.1     03-06  144  |  108  |  18  ----------------------------<  103[H]  3.7   |  23  |  0.56    Ca    8.8      06 Mar 2025 09:09  Mg     2.0     03-06    TPro  6.8  /  Alb  3.6  /  TBili  0.6  /  DBili  x   /  AST  11  /  ALT  19  /  AlkPhos  92  03-06    Urinalysis Basic - ( 06 Mar 2025 09:09 )  Color: x / Appearance: x / SG: x / pH: x  Gluc: 103 mg/dL / Ketone: x  / Bili: x / Urobili: x   Blood: x / Protein: x / Nitrite: x   Leuk Esterase: x / RBC: x / WBC x   Sq Epi: x / Non Sq Epi: x / Bacteria: x    Consultant(s) Notes Reviewed: Care Coordination    Care Discussed with Consultants/Other Providers: PREETI Bose    MEDICATIONS  (STANDING):  ascorbic acid 500 milliGRAM(s) Oral daily  citalopram 10 milliGRAM(s) Oral daily  cyanocobalamin 1000 MICROGram(s) Oral daily  enoxaparin Injectable 40 milliGRAM(s) SubCutaneous every 24 hours  ketorolac   Injectable 15 milliGRAM(s) IV Push once  metoprolol succinate ER 25 milliGRAM(s) Oral daily  nystatin Powder 1 Application(s) Topical two times a day  piperacillin/tazobactam IVPB.. 3.375 Gram(s) IV Intermittent every 8 hours  sodium chloride 0.9%. 1000 milliLiter(s) (50 mL/Hr) IV Continuous <Continuous>    MEDICATIONS  (PRN):  acetaminophen     Tablet .. 650 milliGRAM(s) Oral every 6 hours PRN Temp greater or equal to 38C (100.4F), Mild Pain (1 - 3)

## 2025-03-08 NOTE — PROGRESS NOTE ADULT - PROBLEM SELECTOR PLAN 2
- As noted above, CT findings of compression deformities of T11, L1, L4, L5  - XR LS as reported: Redemonstrated severe T12, moderate to severe L4, mild to moderate L3, and mild T11 superior endplate compression fracture deformities without gross progressive collapse. Remaining visualized vertebral body heights maintained. Redemonstrated multilevel degenerative changes including lower lumbar predominant posterior facet arthrosis. Partially visualized right total hip prosthesis and left hip fracture fixation hardware. Generalized osteopenia otherwise no discrete lytic or blastic lesions.  - Orthopedics recommendations appreciated: Pain control; WBAT with assistive devices as needed; TLSO for comfort   medical management per primary team; No acute orthopaedic surgical intervention indicated at this time. This patient is orthopaedically stable for discharge. Patient to follow up with Dr. Palm as an outpatient for further evaluation and management. 36.7

## 2025-03-08 NOTE — PROGRESS NOTE ADULT - PROBLEM SELECTOR PLAN 6
- DVT ppx: lovenox  - Code status: DNR/DNI, no feeding tube per [facility] faxed over from facility  - Son Yuri is HCP, updated via phone on admission - is traveling abroad at this time, asks that we call his wife Philly's phone at [phone] with updates -- called on 3/4, 3/5, and 3/6 left [facility] with callback number

## 2025-03-09 PROCEDURE — 99232 SBSQ HOSP IP/OBS MODERATE 35: CPT

## 2025-03-09 RX ADMIN — METOPROLOL SUCCINATE 25 MILLIGRAM(S): 50 TABLET, EXTENDED RELEASE ORAL at 05:57

## 2025-03-09 RX ADMIN — CITALOPRAM 10 MILLIGRAM(S): 20 TABLET ORAL at 13:17

## 2025-03-09 RX ADMIN — CYANOCOBALAMIN 1000 MICROGRAM(S): 1000 INJECTION INTRAMUSCULAR; SUBCUTANEOUS at 13:18

## 2025-03-09 RX ADMIN — NYSTATIN 1 APPLICATION(S): 100000 CREAM TOPICAL at 06:11

## 2025-03-09 RX ADMIN — NYSTATIN 1 APPLICATION(S): 100000 CREAM TOPICAL at 16:10

## 2025-03-09 RX ADMIN — ENOXAPARIN SODIUM 40 MILLIGRAM(S): 100 INJECTION SUBCUTANEOUS at 16:10

## 2025-03-09 RX ADMIN — Medication 500 MILLIGRAM(S): at 13:18

## 2025-03-09 NOTE — PROGRESS NOTE ADULT - PROBLEM/PLAN-6
DISPLAY PLAN FREE TEXT
Abdomen soft, non-tender, no guarding.

## 2025-03-09 NOTE — PROGRESS NOTE ADULT - PROBLEM/PLAN-5
DISPLAY PLAN FREE TEXT
Improving headache. will get head CT to r/o bleed

## 2025-03-09 NOTE — PROGRESS NOTE ADULT - PROBLEM SELECTOR PROBLEM 6
Prophylactic measure
IMPROVE VTE Individual Risk Assessment  RISK                                                                Points  [  ] Previous VTE                                                  3  [  ] Thrombophilia                                               2  [  ] Lower limb paralysis                                      2        (unable to hold up >15 seconds)    [  ] Current Cancer                                              2         (within 6 months)  [x  ] Immobilization > 24 hrs                                1  [  ] ICU/CCU stay > 24 hours                              1  [x  ] Age > 60                                                      1  IMPROVE VTE Score ___2, heparin for DVT proph

## 2025-03-09 NOTE — PROGRESS NOTE ADULT - PROBLEM SELECTOR PLAN 5
- Continue metop succinate 25 mg daily

## 2025-03-09 NOTE — PROGRESS NOTE ADULT - PROBLEM SELECTOR PROBLEM 2
Compression deformity of vertebra

## 2025-03-09 NOTE — PROGRESS NOTE ADULT - ASSESSMENT
89yoF w/ PMHx of Alzheimer's disease, HTN, MDD who presents from [facility] [place] after unwitnessed fall, found to have ESBL UTI. 
89yoF w/ PMHx of Alzheimer's disease, HTN, MDD who presents from [facility] [place] after unwitnessed fall.
89yoF w/ PMHx of Alzheimer's disease, HTN, MDD who presents from [facility] [place] after unwitnessed fall.
89yoF w/ PMHx of Alzheimer's disease, HTN, MDD who presents from [facility] [place] after unwitnessed fall, found to have ESBL UTI s/p IV Abx course w/ Zosyn. 
89yoF w/ PMHx of Alzheimer's disease, HTN, MDD who presents from [facility] [place] after unwitnessed fall.
89yoF w/ PMHx of Alzheimer's disease, HTN, MDD who presents from [facility] [place] after unwitnessed fall, found to have ESBL UTI.

## 2025-03-09 NOTE — PROGRESS NOTE ADULT - PROBLEM SELECTOR PLAN 4
- At baseline incontinent of b/b per facility staff  - UA positive, given dose of CTX in ED  - F/u UCx - EColi - ESBL Ecoli -- changed Abx from IV CTZ to IV Zosyn - s/p 3 more days of Abx w/ IV Zosyn  - monitor CBC

## 2025-03-09 NOTE — PROGRESS NOTE ADULT - PROBLEM SELECTOR PLAN 6
- DVT ppx: lovenox  - Code status: DNR/DNI, no feeding tube per [facility] faxed over from facility  - Son Yuri is HCP, updated via phone on admission - is traveling [place] at this time, asks that we call his wife Philly's phone at [phone] with updates -- called on 3/4, 3/5, 3/6, and 3/7 - left VMs with callback number

## 2025-03-09 NOTE — PROGRESS NOTE ADULT - TIME BILLING
conducting history and physical examination, reviewing and ordering diagnostic workup as above, discussing with consultants, determining acute diagnoses / plan for continued monitoring and management, and communication.

## 2025-03-09 NOTE — PROGRESS NOTE ADULT - PROBLEM SELECTOR PROBLEM 5
HTN (hypertension)
ambulatory

## 2025-03-09 NOTE — PROGRESS NOTE ADULT - PROBLEM SELECTOR PLAN 3
- At baseline, docile, oriented to self, answers some questions/has short word sentences but does not partake in prolonged conversation, on regular consistency, easy/soft to chew texture diet  - Per staff, no longer on donepezil  - Aspiration precautions
- At baseline, docile, oriented to self, answers some questions/has short word sentences but does not partake in prolonged conversation, on regular consistency, easy/soft to chew texture diet  - Per staff, no longer on donepezil  - Per ED RN, pt failed dysphagia screen, unable to follow commands, or even take sips of water  - Speech/swallow evaluation

## 2025-03-09 NOTE — PROGRESS NOTE ADULT - SUBJECTIVE AND OBJECTIVE BOX
INCOMPLETE NOTE    Gogo Colón M.D.  Division of Hospital Medicine  Available on [employer] TEAMS    SUBJECTIVE / ACUTE INTERVAL EVENTS: Patient seen and examined. No overnight events. No subjective complaints.     OBJECTIVE:   Vital Signs Last 24 Hrs  T(F): 97.5 (09 Mar 2025 04:53), Max: 99.3 (08 Mar 2025 15:56)  HR: 86 (09 Mar 2025 04:53) (80 - 86)  BP: 136/83 (09 Mar 2025 04:53) (95/57 - 136/83)  RR: 18 (09 Mar 2025 04:53) (18 - 19)  SpO2: 92% (09 Mar 2025 04:53) (92% - 94%)  Parameters below as of 09 Mar 2025 04:53  Patient On (Oxygen Delivery Method): room air    I&O's Summary  08 Mar 2025 06:01  -  09 Mar 2025 07:00  --------------------------------------------------------  IN: 0 mL / OUT: 600 mL / NET: -600 mL    Physical Examination:  GEN: elderly woman, laying in bed in NAD  PSYCH: A&Ox1, mood and affect appear appropriate   NEURO: no focal neurologic deficits appreciated  RESPI: no accessory muscle use, B/L air entry   CARDIO: regular rate/rhythm, no LE edema B/L  ABD: soft, NT, ND, +BS  EXT: patient able to move all extremities spontaneously  VASC: peripheral pulses palpated    Care Discussed with Consultants/Other Providers: PREETI Bose    MEDICATIONS  (STANDING):  ascorbic acid 500 milliGRAM(s) Oral daily  citalopram 10 milliGRAM(s) Oral daily  cyanocobalamin 1000 MICROGram(s) Oral daily  enoxaparin Injectable 40 milliGRAM(s) SubCutaneous every 24 hours  ketorolac   Injectable 15 milliGRAM(s) IV Push once  metoprolol succinate ER 25 milliGRAM(s) Oral daily  nystatin Powder 1 Application(s) Topical two times a day  sodium chloride 0.9%. 1000 milliLiter(s) (50 mL/Hr) IV Continuous <Continuous>    MEDICATIONS  (PRN):  acetaminophen     Tablet .. 650 milliGRAM(s) Oral every 6 hours PRN Temp greater or equal to 38C (100.4F), Mild Pain (1 - 3) Gogo Colón M.D.  Division of Hospital Medicine  Available on [employer] TEAMS    SUBJECTIVE / ACUTE INTERVAL EVENTS: Patient seen and examined. No overnight events. No subjective complaints. Still awaiting contact with patient's family to be able to initiate disposition planning back to [facility] -- patient is now medically ready for discharge as she has completed IV ABx course for ESBL UTI.      OBJECTIVE:   Vital Signs Last 24 Hrs  T(F): 97.5 (09 Mar 2025 04:53), Max: 99.3 (08 Mar 2025 15:56)  HR: 86 (09 Mar 2025 04:53) (80 - 86)  BP: 136/83 (09 Mar 2025 04:53) (95/57 - 136/83)  RR: 18 (09 Mar 2025 04:53) (18 - 19)  SpO2: 92% (09 Mar 2025 04:53) (92% - 94%)  Parameters below as of 09 Mar 2025 04:53  Patient On (Oxygen Delivery Method): room air    I&O's Summary  08 Mar 2025 06:01  -  09 Mar 2025 07:00  --------------------------------------------------------  IN: 0 mL / OUT: 600 mL / NET: -600 mL    Physical Examination:  GEN: elderly woman, laying in bed in NAD  PSYCH: A&Ox1, mood and affect appear appropriate   NEURO: no focal neurologic deficits appreciated  RESPI: no accessory muscle use, B/L air entry   CARDIO: regular rate/rhythm, no LE edema B/L  ABD: soft, NT, ND, +BS  EXT: patient able to move all extremities spontaneously  VASC: peripheral pulses palpated    Care Discussed with Consultants/Other Providers: PREETI Bose    MEDICATIONS  (STANDING):  ascorbic acid 500 milliGRAM(s) Oral daily  citalopram 10 milliGRAM(s) Oral daily  cyanocobalamin 1000 MICROGram(s) Oral daily  enoxaparin Injectable 40 milliGRAM(s) SubCutaneous every 24 hours  ketorolac   Injectable 15 milliGRAM(s) IV Push once  metoprolol succinate ER 25 milliGRAM(s) Oral daily  nystatin Powder 1 Application(s) Topical two times a day  sodium chloride 0.9%. 1000 milliLiter(s) (50 mL/Hr) IV Continuous <Continuous>    MEDICATIONS  (PRN):  acetaminophen     Tablet .. 650 milliGRAM(s) Oral every 6 hours PRN Temp greater or equal to 38C (100.4F), Mild Pain (1 - 3)

## 2025-03-10 ENCOUNTER — TRANSCRIPTION ENCOUNTER (OUTPATIENT)
Age: 89
End: 2025-03-10

## 2025-03-10 VITALS
HEART RATE: 80 BPM | RESPIRATION RATE: 18 BRPM | TEMPERATURE: 98 F | SYSTOLIC BLOOD PRESSURE: 103 MMHG | OXYGEN SATURATION: 96 % | DIASTOLIC BLOOD PRESSURE: 70 MMHG

## 2025-03-10 PROCEDURE — 82330 ASSAY OF CALCIUM: CPT

## 2025-03-10 PROCEDURE — 82435 ASSAY OF BLOOD CHLORIDE: CPT

## 2025-03-10 PROCEDURE — 82550 ASSAY OF CK (CPK): CPT

## 2025-03-10 PROCEDURE — 85014 HEMATOCRIT: CPT

## 2025-03-10 PROCEDURE — 73564 X-RAY EXAM KNEE 4 OR MORE: CPT

## 2025-03-10 PROCEDURE — 73030 X-RAY EXAM OF SHOULDER: CPT

## 2025-03-10 PROCEDURE — 85025 COMPLETE CBC W/AUTO DIFF WBC: CPT

## 2025-03-10 PROCEDURE — 87077 CULTURE AEROBIC IDENTIFY: CPT

## 2025-03-10 PROCEDURE — 90715 TDAP VACCINE 7 YRS/> IM: CPT

## 2025-03-10 PROCEDURE — 81001 URINALYSIS AUTO W/SCOPE: CPT

## 2025-03-10 PROCEDURE — 97110 THERAPEUTIC EXERCISES: CPT

## 2025-03-10 PROCEDURE — 97530 THERAPEUTIC ACTIVITIES: CPT

## 2025-03-10 PROCEDURE — 71250 CT THORAX DX C-: CPT | Mod: MC

## 2025-03-10 PROCEDURE — 85730 THROMBOPLASTIN TIME PARTIAL: CPT

## 2025-03-10 PROCEDURE — 83605 ASSAY OF LACTIC ACID: CPT

## 2025-03-10 PROCEDURE — 99239 HOSP IP/OBS DSCHRG MGMT >30: CPT

## 2025-03-10 PROCEDURE — 70450 CT HEAD/BRAIN W/O DYE: CPT | Mod: MC

## 2025-03-10 PROCEDURE — 87186 SC STD MICRODIL/AGAR DIL: CPT

## 2025-03-10 PROCEDURE — 74176 CT ABD & PELVIS W/O CONTRAST: CPT | Mod: MC

## 2025-03-10 PROCEDURE — 96374 THER/PROPH/DIAG INJ IV PUSH: CPT

## 2025-03-10 PROCEDURE — 86900 BLOOD TYPING SEROLOGIC ABO: CPT

## 2025-03-10 PROCEDURE — 80053 COMPREHEN METABOLIC PANEL: CPT

## 2025-03-10 PROCEDURE — 84132 ASSAY OF SERUM POTASSIUM: CPT

## 2025-03-10 PROCEDURE — 72100 X-RAY EXAM L-S SPINE 2/3 VWS: CPT

## 2025-03-10 PROCEDURE — 73502 X-RAY EXAM HIP UNI 2-3 VIEWS: CPT

## 2025-03-10 PROCEDURE — 84295 ASSAY OF SERUM SODIUM: CPT

## 2025-03-10 PROCEDURE — 84484 ASSAY OF TROPONIN QUANT: CPT

## 2025-03-10 PROCEDURE — 82803 BLOOD GASES ANY COMBINATION: CPT

## 2025-03-10 PROCEDURE — 87086 URINE CULTURE/COLONY COUNT: CPT

## 2025-03-10 PROCEDURE — 72125 CT NECK SPINE W/O DYE: CPT | Mod: MC

## 2025-03-10 PROCEDURE — 86850 RBC ANTIBODY SCREEN: CPT

## 2025-03-10 PROCEDURE — 85018 HEMOGLOBIN: CPT

## 2025-03-10 PROCEDURE — 82553 CREATINE MB FRACTION: CPT

## 2025-03-10 PROCEDURE — 90471 IMMUNIZATION ADMIN: CPT

## 2025-03-10 PROCEDURE — 73552 X-RAY EXAM OF FEMUR 2/>: CPT

## 2025-03-10 PROCEDURE — 86901 BLOOD TYPING SEROLOGIC RH(D): CPT

## 2025-03-10 PROCEDURE — 97162 PT EVAL MOD COMPLEX 30 MIN: CPT

## 2025-03-10 PROCEDURE — 99285 EMERGENCY DEPT VISIT HI MDM: CPT | Mod: 25

## 2025-03-10 PROCEDURE — 85610 PROTHROMBIN TIME: CPT

## 2025-03-10 PROCEDURE — 96375 TX/PRO/DX INJ NEW DRUG ADDON: CPT

## 2025-03-10 PROCEDURE — 83735 ASSAY OF MAGNESIUM: CPT

## 2025-03-10 PROCEDURE — 82947 ASSAY GLUCOSE BLOOD QUANT: CPT

## 2025-03-10 PROCEDURE — 87040 BLOOD CULTURE FOR BACTERIA: CPT

## 2025-03-10 RX ADMIN — NYSTATIN 1 APPLICATION(S): 100000 CREAM TOPICAL at 05:56

## 2025-03-10 RX ADMIN — METOPROLOL SUCCINATE 25 MILLIGRAM(S): 50 TABLET, EXTENDED RELEASE ORAL at 05:56

## 2025-03-10 RX ADMIN — CYANOCOBALAMIN 1000 MICROGRAM(S): 1000 INJECTION INTRAMUSCULAR; SUBCUTANEOUS at 11:34

## 2025-03-10 RX ADMIN — Medication 500 MILLIGRAM(S): at 11:34

## 2025-03-10 RX ADMIN — CITALOPRAM 10 MILLIGRAM(S): 20 TABLET ORAL at 11:34

## 2025-03-10 NOTE — DISCHARGE NOTE NURSING/CASE MANAGEMENT/SOCIAL WORK - FINANCIAL ASSISTANCE
[facility] provides services at a reduced cost to those who are determined to be eligible through [facility]’s financial assistance program. Information regarding [facility]’s financial assistance program can be found by going to [email or web address] or by calling [phone].

## 2025-03-10 NOTE — DISCHARGE NOTE NURSING/CASE MANAGEMENT/SOCIAL WORK - NSDCVIVACCINE_GEN_ALL_CORE_FT
Tdap; 03-Mar-2025 09:39; Darlyn Mccartney (RN); [employer]; [id number] (Exp. Date: 30-Apr-2026); IntraMuscular; Deltoid Right.; 0.5 milliLiter(s); VIS (VIS Published: 09-May-2013, VIS Presented: 03-Mar-2025);

## 2025-03-10 NOTE — DISCHARGE NOTE NURSING/CASE MANAGEMENT/SOCIAL WORK - NSDCFUADDAPPT_GEN_ALL_CORE_FT
APPTS ARE READY TO BE MADE: [X] YES    Best Family or Patient Contact (if needed):    Additional Information about above appointments (if needed):    1: Orthopedics  2:   3:     Other comments or requests:

## 2025-03-10 NOTE — DISCHARGE NOTE NURSING/CASE MANAGEMENT/SOCIAL WORK - PATIENT PORTAL LINK FT
You can access the FollowMyHealth Patient Portal offered by  by registering at the following website: [email or web address]. By joining [facility]’s FollowMyHealth portal, you will also be able to view your health information using other applications (apps) compatible with our system.

## 2025-03-24 ENCOUNTER — INPATIENT (INPATIENT)
Facility: HOSPITAL | Age: 89
LOS: 0 days | Discharge: NURSING, REHAB OR LONG-TERM CARE | DRG: 690 | End: 2025-03-25
Attending: STUDENT IN AN ORGANIZED HEALTH CARE EDUCATION/TRAINING PROGRAM | Admitting: STUDENT IN AN ORGANIZED HEALTH CARE EDUCATION/TRAINING PROGRAM
Payer: MEDICARE

## 2025-03-24 VITALS
DIASTOLIC BLOOD PRESSURE: 85 MMHG | SYSTOLIC BLOOD PRESSURE: 129 MMHG | WEIGHT: 130.07 LBS | HEIGHT: 62 IN | HEART RATE: 103 BPM | OXYGEN SATURATION: 95 % | TEMPERATURE: 98 F | RESPIRATION RATE: 18 BRPM

## 2025-03-24 DIAGNOSIS — Z96.641 PRESENCE OF RIGHT ARTIFICIAL HIP JOINT: Chronic | ICD-10-CM
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
## 2025-03-24 DIAGNOSIS — N39.0 URINARY TRACT INFECTION, SITE NOT SPECIFIED: ICD-10-CM

## 2025-03-24 DIAGNOSIS — Z96.652 PRESENCE OF LEFT ARTIFICIAL KNEE JOINT: Chronic | ICD-10-CM

## 2025-03-24 DIAGNOSIS — Z71.89 OTHER SPECIFIED COUNSELING: ICD-10-CM

## 2025-03-24 DIAGNOSIS — G30.9 ALZHEIMER'S DISEASE, UNSPECIFIED: ICD-10-CM

## 2025-03-24 DIAGNOSIS — Z51.5 ENCOUNTER FOR PALLIATIVE CARE: ICD-10-CM

## 2025-03-24 LAB
ALBUMIN SERPL ELPH-MCNC: 4.1 G/DL — SIGNIFICANT CHANGE UP (ref 3.3–5)
ALP SERPL-CCNC: 103 U/L — SIGNIFICANT CHANGE UP (ref 40–120)
ALT FLD-CCNC: 20 U/L — SIGNIFICANT CHANGE UP (ref 10–45)
ANION GAP SERPL CALC-SCNC: 14 MMOL/L — SIGNIFICANT CHANGE UP (ref 5–17)
APPEARANCE UR: CLEAR — SIGNIFICANT CHANGE UP
APTT BLD: 29.7 SEC — SIGNIFICANT CHANGE UP (ref 24.5–35.6)
AST SERPL-CCNC: 13 U/L — SIGNIFICANT CHANGE UP (ref 10–40)
BACTERIA # UR AUTO: ABNORMAL /HPF
BASOPHILS # BLD AUTO: 0.02 K/UL — SIGNIFICANT CHANGE UP (ref 0–0.2)
BASOPHILS NFR BLD AUTO: 0.2 % — SIGNIFICANT CHANGE UP (ref 0–2)
BILIRUB SERPL-MCNC: 0.5 MG/DL — SIGNIFICANT CHANGE UP (ref 0.2–1.2)
BILIRUB UR-MCNC: NEGATIVE — SIGNIFICANT CHANGE UP
BUN SERPL-MCNC: 17 MG/DL — SIGNIFICANT CHANGE UP (ref 7–23)
CALCIUM SERPL-MCNC: 9.5 MG/DL — SIGNIFICANT CHANGE UP (ref 8.4–10.5)
CAST: 0 /LPF — SIGNIFICANT CHANGE UP (ref 0–4)
CHLORIDE SERPL-SCNC: 106 MMOL/L — SIGNIFICANT CHANGE UP (ref 96–108)
CK SERPL-CCNC: 140 U/L — SIGNIFICANT CHANGE UP (ref 25–170)
CO2 SERPL-SCNC: 22 MMOL/L — SIGNIFICANT CHANGE UP (ref 22–31)
COLOR SPEC: YELLOW — SIGNIFICANT CHANGE UP
CREAT SERPL-MCNC: 0.44 MG/DL — LOW (ref 0.5–1.3)
DIFF PNL FLD: ABNORMAL
EGFR: 94 ML/MIN/1.73M2 — SIGNIFICANT CHANGE UP
EGFR: 94 ML/MIN/1.73M2 — SIGNIFICANT CHANGE UP
EOSINOPHIL # BLD AUTO: 0 K/UL — SIGNIFICANT CHANGE UP (ref 0–0.5)
EOSINOPHIL NFR BLD AUTO: 0 % — SIGNIFICANT CHANGE UP (ref 0–6)
GAS PNL BLDV: SIGNIFICANT CHANGE UP
GAS PNL BLDV: SIGNIFICANT CHANGE UP
GLUCOSE SERPL-MCNC: 128 MG/DL — HIGH (ref 70–99)
GLUCOSE UR QL: NEGATIVE MG/DL — SIGNIFICANT CHANGE UP
HCT VFR BLD CALC: 40.6 % — SIGNIFICANT CHANGE UP (ref 34.5–45)
HGB BLD-MCNC: 13 G/DL — SIGNIFICANT CHANGE UP (ref 11.5–15.5)
IMM GRANULOCYTES NFR BLD AUTO: 0.6 % — SIGNIFICANT CHANGE UP (ref 0–0.9)
INR BLD: 1.08 RATIO — SIGNIFICANT CHANGE UP (ref 0.85–1.16)
KETONES UR-MCNC: 15 MG/DL
LEUKOCYTE ESTERASE UR-ACNC: ABNORMAL
LYMPHOCYTES # BLD AUTO: 0.63 K/UL — LOW (ref 1–3.3)
LYMPHOCYTES # BLD AUTO: 7.7 % — LOW (ref 13–44)
MCHC RBC-ENTMCNC: 31.3 PG — SIGNIFICANT CHANGE UP (ref 27–34)
MCHC RBC-ENTMCNC: 32 G/DL — SIGNIFICANT CHANGE UP (ref 32–36)
MCV RBC AUTO: 97.8 FL — SIGNIFICANT CHANGE UP (ref 80–100)
MONOCYTES # BLD AUTO: 0.3 K/UL — SIGNIFICANT CHANGE UP (ref 0–0.9)
MONOCYTES NFR BLD AUTO: 3.7 % — SIGNIFICANT CHANGE UP (ref 2–14)
NEUTROPHILS # BLD AUTO: 7.16 K/UL — SIGNIFICANT CHANGE UP (ref 1.8–7.4)
NEUTROPHILS NFR BLD AUTO: 87.8 % — HIGH (ref 43–77)
NITRITE UR-MCNC: POSITIVE
NRBC BLD AUTO-RTO: 0 /100 WBCS — SIGNIFICANT CHANGE UP (ref 0–0)
PH UR: 6.5 — SIGNIFICANT CHANGE UP (ref 5–8)
PLATELET # BLD AUTO: 199 K/UL — SIGNIFICANT CHANGE UP (ref 150–400)
POTASSIUM SERPL-MCNC: 4.4 MMOL/L — SIGNIFICANT CHANGE UP (ref 3.5–5.3)
POTASSIUM SERPL-SCNC: 4.4 MMOL/L — SIGNIFICANT CHANGE UP (ref 3.5–5.3)
PROT SERPL-MCNC: 7.7 G/DL — SIGNIFICANT CHANGE UP (ref 6–8.3)
PROT UR-MCNC: SIGNIFICANT CHANGE UP MG/DL
PROTHROM AB SERPL-ACNC: 12.4 SEC — SIGNIFICANT CHANGE UP (ref 9.9–13.4)
RBC # BLD: 4.15 M/UL — SIGNIFICANT CHANGE UP (ref 3.8–5.2)
RBC # FLD: 13.2 % — SIGNIFICANT CHANGE UP (ref 10.3–14.5)
RBC CASTS # UR COMP ASSIST: 2 /HPF — SIGNIFICANT CHANGE UP (ref 0–4)
SODIUM SERPL-SCNC: 142 MMOL/L — SIGNIFICANT CHANGE UP (ref 135–145)
SP GR SPEC: 1.02 — SIGNIFICANT CHANGE UP (ref 1–1.03)
SQUAMOUS # UR AUTO: 1 /HPF — SIGNIFICANT CHANGE UP (ref 0–5)
UROBILINOGEN FLD QL: 0.2 MG/DL — SIGNIFICANT CHANGE UP (ref 0.2–1)
WBC # BLD: 8.16 K/UL — SIGNIFICANT CHANGE UP (ref 3.8–10.5)
WBC # FLD AUTO: 8.16 K/UL — SIGNIFICANT CHANGE UP (ref 3.8–10.5)
WBC UR QL: 27 /HPF — HIGH (ref 0–5)

## 2025-03-24 PROCEDURE — 73590 X-RAY EXAM OF LOWER LEG: CPT | Mod: 26,LT

## 2025-03-24 PROCEDURE — 99223 1ST HOSP IP/OBS HIGH 75: CPT

## 2025-03-24 PROCEDURE — 70450 CT HEAD/BRAIN W/O DYE: CPT | Mod: 26

## 2025-03-24 PROCEDURE — 99285 EMERGENCY DEPT VISIT HI MDM: CPT | Mod: GC

## 2025-03-24 PROCEDURE — 73552 X-RAY EXAM OF FEMUR 2/>: CPT | Mod: 26,LT

## 2025-03-24 PROCEDURE — 99497 ADVNCD CARE PLAN 30 MIN: CPT | Mod: 25

## 2025-03-24 PROCEDURE — 73502 X-RAY EXAM HIP UNI 2-3 VIEWS: CPT | Mod: 26,LT

## 2025-03-24 PROCEDURE — 72125 CT NECK SPINE W/O DYE: CPT | Mod: 26

## 2025-03-24 PROCEDURE — 71045 X-RAY EXAM CHEST 1 VIEW: CPT | Mod: 26

## 2025-03-24 RX ORDER — ERTAPENEM SODIUM 1 G/1
1000 INJECTION, POWDER, LYOPHILIZED, FOR SOLUTION INTRAMUSCULAR; INTRAVENOUS ONCE
Refills: 0 | Status: DISCONTINUED | OUTPATIENT
Start: 2025-03-24 | End: 2025-03-24

## 2025-03-24 RX ORDER — METOPROLOL SUCCINATE 50 MG/1
1 TABLET, EXTENDED RELEASE ORAL
Refills: 0 | DISCHARGE

## 2025-03-24 RX ORDER — SODIUM CHLORIDE 9 G/1000ML
1000 INJECTION, SOLUTION INTRAVENOUS ONCE
Refills: 0 | Status: COMPLETED | OUTPATIENT
Start: 2025-03-24 | End: 2025-03-24

## 2025-03-24 RX ORDER — ACETAMINOPHEN 500 MG/5ML
1000 LIQUID (ML) ORAL ONCE
Refills: 0 | Status: COMPLETED | OUTPATIENT
Start: 2025-03-24 | End: 2025-03-24

## 2025-03-24 RX ORDER — ERTAPENEM SODIUM 1 G/1
INJECTION, POWDER, LYOPHILIZED, FOR SOLUTION INTRAMUSCULAR; INTRAVENOUS
Refills: 0 | Status: DISCONTINUED | OUTPATIENT
Start: 2025-03-24 | End: 2025-03-24

## 2025-03-24 RX ORDER — METOPROLOL SUCCINATE 50 MG/1
25 TABLET, EXTENDED RELEASE ORAL DAILY
Refills: 0 | Status: DISCONTINUED | OUTPATIENT
Start: 2025-03-24 | End: 2025-03-25

## 2025-03-24 RX ORDER — HALOPERIDOL 10 MG/1
1 TABLET ORAL
Refills: 0 | Status: DISCONTINUED | OUTPATIENT
Start: 2025-03-24 | End: 2025-03-25

## 2025-03-24 RX ORDER — CYANOCOBALAMIN 1000 UG/ML
1000 INJECTION INTRAMUSCULAR; SUBCUTANEOUS DAILY
Refills: 0 | Status: DISCONTINUED | OUTPATIENT
Start: 2025-03-24 | End: 2025-03-25

## 2025-03-24 RX ORDER — CITALOPRAM 20 MG/1
10 TABLET ORAL DAILY
Refills: 0 | Status: DISCONTINUED | OUTPATIENT
Start: 2025-03-24 | End: 2025-03-25

## 2025-03-24 RX ORDER — PIPERACILLIN-TAZO-DEXTROSE,ISO 3.375G/5
3.38 IV SOLUTION, PIGGYBACK PREMIX FROZEN(ML) INTRAVENOUS ONCE
Refills: 0 | Status: COMPLETED | OUTPATIENT
Start: 2025-03-24 | End: 2025-03-24

## 2025-03-24 RX ORDER — METHYLPREDNISOLONE ACETATE 80 MG/ML
40 INJECTION, SUSPENSION INTRA-ARTICULAR; INTRALESIONAL; INTRAMUSCULAR; SOFT TISSUE ONCE
Refills: 0 | Status: DISCONTINUED | OUTPATIENT
Start: 2025-03-24 | End: 2025-03-24

## 2025-03-24 RX ORDER — CLOSTRIDIUM TETANI TOXOID ANTIGEN (FORMALDEHYDE INACTIVATED), CORYNEBACTERIUM DIPHTHERIAE TOXOID ANTIGEN (FORMALDEHYDE INACTIVATED), BORDETELLA PERTUSSIS TOXOID ANTIGEN (GLUTARALDEHYDE INACTIVATED), BORDETELLA PERTUSSIS FILAMENTOUS HEMAGGLUTININ ANTIGEN (FORMALDEHYDE INACTIVATED), BORDETELLA PERTUSSIS PERTACTIN ANTIGEN, AND BORDETELLA PERTUSSIS FIMBRIAE 2/3 ANTIGEN 5; 2; 2.5; 5; 3; 5 [LF]/.5ML; [LF]/.5ML; UG/.5ML; UG/.5ML; UG/.5ML; UG/.5ML
0.5 INJECTION, SUSPENSION INTRAMUSCULAR ONCE
Refills: 0 | Status: COMPLETED | OUTPATIENT
Start: 2025-03-24 | End: 2025-03-24

## 2025-03-24 RX ADMIN — SODIUM CHLORIDE 1000 MILLILITER(S): 9 INJECTION, SOLUTION INTRAVENOUS at 09:22

## 2025-03-24 RX ADMIN — Medication 400 MILLIGRAM(S): at 07:52

## 2025-03-24 RX ADMIN — Medication 1000 MILLIGRAM(S): at 08:30

## 2025-03-24 RX ADMIN — CLOSTRIDIUM TETANI TOXOID ANTIGEN (FORMALDEHYDE INACTIVATED), CORYNEBACTERIUM DIPHTHERIAE TOXOID ANTIGEN (FORMALDEHYDE INACTIVATED), BORDETELLA PERTUSSIS TOXOID ANTIGEN (GLUTARALDEHYDE INACTIVATED), BORDETELLA PERTUSSIS FILAMENTOUS HEMAGGLUTININ ANTIGEN (FORMALDEHYDE INACTIVATED), BORDETELLA PERTUSSIS PERTACTIN ANTIGEN, AND BORDETELLA PERTUSSIS FIMBRIAE 2/3 ANTIGEN 0.5 MILLILITER(S): 5; 2; 2.5; 5; 3; 5 INJECTION, SUSPENSION INTRAMUSCULAR at 06:55

## 2025-03-24 RX ADMIN — Medication 200 GRAM(S): at 09:22

## 2025-03-24 NOTE — ED PROVIDER NOTE - ATTENDING CONTRIBUTION TO CARE
Attending MD Yamila Lopez:  I personally have seen and examined this patient.  Resident note reviewed and agree on plan of care and except where noted.  See HPI, PE, and MDM for details.

## 2025-03-24 NOTE — ED PROVIDER NOTE - PROGRESS NOTE DETAILS
Lin: received patient in signout from Dr. Lopez, 89 year old female with history of dementia presenting for fall. Spoke with nurse at facility, patient is at baseline mental status. Has avulsion on L shin which was dressed. Labs thus far unremarkable. Pending imaging, remainder of labs. Menza: UA consistent with UTI, lactate slightly elevated. Tachycardia resolved, NSR on monitor. CT head and C-spine unremarkable, XR imaging pending reads but no obvious fracture on my read. Discussed with hospitalist, given hx ESBL will admit for IV abx.

## 2025-03-24 NOTE — PATIENT PROFILE ADULT - FUNCTIONAL ASSESSMENT - BASIC MOBILITY 3.
Patient : Noah Daugherty Age: 56 year old Sex: male   MRN: [id number] Encounter Date: 9/11/2021      History     Chief Complaint   Patient presents with   • Medical Problem     Patient is a 56 year-old male with past medical history of arthritis, chronic pain, HTN, high cholesterol, and chronic sinusitis who presents to the ED for evaluation of throat swelling. He reports driving himself to the ED, and has his sister at bedside. Patient states the swelling feels like it is present on both the inside and outside of the incision. The patient states he had a partial thyroidectomy on 9/2/21. He states that he feels like he has pressure in his throat and states he has had a lower volume of speaking for about 2 days due to it. He reports throat pain that is present where his incision is located. He reports the incision started swelling 2 days ago. Patient states he is able to swallow normally. He reports 2 days of constipation that he states is due to the codeine he is taking. The patient states his surgery was done by Dr. Wes Henry at [facility]. He reports his first postoperative visit is in 1 week. The patient denies fever, chills, cough, sweats, headache, weakness, decreased activity, decreased appetite, diaphoresis, color change, choking/difficulty swallowing, dyspnea, abdominal pain, n/v/d, constipation, chest pain, dysuria, urinary symptoms, joint pain, rash/lesions, or altered level of consciousness.          Allergies   Allergen Reactions   • Ace Inhibitors RASH   • Lisinopril RASH   • Losartan RASH       Discharge Medication List as of 9/11/2021  4:01 PM      Prior to Admission Medications    Details   atorvastatin (LIPITOR) 20 MG tablet Take 1 tablet by mouth nightly.Eprescribe, Disp-90 tablet, R-1      naproxen (NAPROSYN) 500 MG tablet Take 1 tablet by mouth 2 times daily as needed for Pain. Do not take with other NSAIDsEprescribe, Disp-60 tablet, R-2      cyclobenzaprine (FLEXERIL) 10 MG tablet Take 1  tablet by mouth 2 times daily as needed for Muscle spasms.Eprescribe, Oral, 2 TIMES DAILY PRN, Disp-60 tablet, R-2Consider prescribing in whole tablet strengths, as the tablets can be difficult to break (depends on product carried by the [facility] pharmacy).      amLODIPine (NORVASC) 5 MG tablet Take 1 tablet by mouth every evening.Eprescribe, Disp-30 tablet,R-11             Past Medical History:   Diagnosis Date   • Arthritis    • Back pain    • Chronic pain    • Essential (primary) hypertension    • High cholesterol    • PONV (postoperative nausea and vomiting)    • Sinusitis, chronic        Past Surgical History:   Procedure Laterality Date   • HERNIA REPAIR     • THYROID SURGERY     • TOTAL HIP REPLACEMENT Right        Family History   Problem Relation Age of Onset   • Hypertension Father    • Diabetes Sister    • Asthma Brother        Social History     Tobacco Use   • Smoking status: Never Smoker   • Smokeless tobacco: Never Used   Vaping Use   • Vaping Use: never used   Substance Use Topics   • Alcohol use: No   • Drug use: No       Review of Systems   Constitutional: Negative for activity change, appetite change, chills, diaphoresis, fatigue, fever and unexpected weight change.   HENT: Positive for sore throat and voice change. Negative for hearing loss.         No difficulty swallowing  Throat swelling   Eyes: Negative for visual disturbance.   Respiratory: Negative for cough and chest tightness.    Cardiovascular: Negative for chest pain and leg swelling.   Gastrointestinal: Negative for abdominal pain, constipation, diarrhea and vomiting.   Endocrine: Negative for polyuria.   Genitourinary: Negative for decreased urine volume, difficulty urinating, dysuria and frequency.   Musculoskeletal: Negative for gait problem and myalgias.   Skin: Negative for rash.   Neurological: Positive for headaches. Negative for dizziness and weakness.   Hematological: Does not bruise/bleed easily.   Psychiatric/Behavioral:  Negative for sleep disturbance.       Physical Exam     ED Triage Vitals   ED Triage Vitals Group      Temp 09/11/21 0559 98.2 °F (36.8 °C)      Heart Rate 09/11/21 0559 99      Resp 09/11/21 0559 18      BP 09/11/21 0559 (!) 160/111      SpO2 09/11/21 0559 99 %      EtCO2 mmHg --       Height 09/11/21 1405 5' 11\" (1.803 m)      Weight 09/11/21 1405 208 lb (94.3 kg)      Weight Scale Used --       BMI (Calculated) 09/11/21 1405 29.01      IBW/kg (Calculated) 09/11/21 1405 75.3       Physical Exam  Vitals and nursing note reviewed.   Constitutional:       Appearance: Normal appearance.   HENT:      Head: Normocephalic and atraumatic.      Jaw: No trismus or swelling.      Comments: Normal turbinates without polyps or catarrh         Right Ear: Tympanic membrane normal.      Left Ear: Tympanic membrane normal.      Nose: Nose normal.      Mouth/Throat:      Mouth: Mucous membranes are moist.      Pharynx: Oropharynx is clear. No oropharyngeal exudate or posterior oropharyngeal erythema.      Comments: Mallampatti I  Eyes:      Extraocular Movements: Extraocular movements intact.      Conjunctiva/sclera: Conjunctivae normal.      Pupils: Pupils are equal, round, and reactive to light.      Comments: Normal scleral and palpebral conjunctiva   Neck:      Thyroid: No thyromegaly.      Vascular: No carotid bruit or JVD.      Comments: Strong carotid upstroke, no thrill or bruit over the carotid    Surgical scar across base of the neck well healing, consistent with partial thyroidectomy performed 9 days ago. Swelling to the surgical area that is diffuse, soft, and possible fluctuant.    tenderness diffusely throughtout the patient's neck. Demonstrates good ROM without hestistation but admits to discomfort with motion  Cardiovascular:      Rate and Rhythm: Normal rate and regular rhythm.      Pulses: Normal pulses.      Heart sounds: No murmur heard.     Pulmonary:      Effort: Pulmonary effort is normal.      Breath sounds:  Normal breath sounds. No wheezing, rhonchi or rales.      Comments: No conversational dyspnea  Abdominal:      General: Abdomen is protuberant. Bowel sounds are normal.      Palpations: Abdomen is soft. There is no shifting dullness, fluid wave, hepatomegaly, splenomegaly, mass or pulsatile mass.      Tenderness: There is no abdominal tenderness. There is no guarding or rebound.      Comments: No hepatosplenomegaly or pulsatile masses, no air fluid interface to percussion and no obvious fluid wave   Musculoskeletal:         General: No swelling or deformity.      Cervical back: Neck supple. No edema.      Right lower leg: No edema.      Left lower leg: No edema.   Lymphadenopathy:      Cervical: No cervical adenopathy.   Skin:     General: Skin is warm and dry.      Capillary Refill: Capillary refill takes less than 2 seconds.      Coloration: Skin is not jaundiced or pale.      Findings: No rash.      Comments: Skin is warm and dry with normal turgor and no rashes   Neurological:      General: No focal deficit present.      Mental Status: He is alert and oriented to person, place, and time. Mental status is at baseline.   Psychiatric:         Mood and Affect: Mood normal.         Behavior: Behavior normal.         Thought Content: Thought content normal.         Judgment: Judgment normal.         ED Course     Procedures    Lab Results     Results for orders placed or performed during the hospital encounter of 09/11/21   Light Green Top   Result Value Ref Range    Extra Tube Hold for Add Ons    Lavender Top   Result Value Ref Range    Extra Tube Hold for Add Ons    Basic Metabolic Panel   Result Value Ref Range    Fasting Status      Sodium 137 135 - 145 mmol/L    Potassium 4.1 3.4 - 5.1 mmol/L    Chloride 103 98 - 107 mmol/L    Carbon Dioxide 30 21 - 32 mmol/L    Anion Gap 8 (L) 10 - 20 mmol/L    Glucose 118 (H) 65 - 99 mg/dL    BUN 13 6 - 20 mg/dL    Creatinine 1.07 0.67 - 1.17 mg/dL    Glomerular Filtration Rate  90 >=60    BUN/ Creatinine Ratio 12 7 - 25    Calcium 8.5 8.4 - 10.2 mg/dL   CBC with Automated Differential (performable only)   Result Value Ref Range    WBC 6.1 4.2 - 11.0 K/mcL    RBC 5.46 4.50 - 5.90 mil/mcL    HGB 12.4 (L) 13.0 - 17.0 g/dL    HCT 40.7 39.0 - 51.0 %    MCV 74.5 (L) 78.0 - 100.0 fl    MCH 22.7 (L) 26.0 - 34.0 pg    MCHC 30.5 (L) 32.0 - 36.5 g/dL    RDW-CV 14.8 11.0 - 15.0 %    RDW-SD 39.3 39.0 - 50.0 fL     140 - 450 K/mcL    NRBC 0 <=0 /100 WBC    Neutrophil, Percent 71 %    Lymphocytes, Percent 18 %    Mono, Percent 9 %    Eosinophils, Percent 2 %    Basophils, Percent 0 %    Immature Granulocytes 0 %    Absolute Neutrophils 4.3 1.8 - 7.7 K/mcL    Absolute Lymphocytes 1.1 1.0 - 4.0 K/mcL    Absolute Monocytes 0.6 0.3 - 0.9 K/mcL    Absolute Eosinophils  0.1 0.0 - 0.5 K/mcL    Absolute Basophils 0.0 0.0 - 0.3 K/mcL    Absolute Immmature Granulocytes 0.0 0.0 - 0.2 K/mcL   Sedimentation Rate [facility]   Result Value Ref Range    RBC Sedimentation Rate 68 (H) 0 - 20 mm/hr   C Reactive Protein   Result Value Ref Range    C-Reactive Protein 2.8 (H) <=1.0 mg/dL   Lactic Acid Venous With Reflex   Result Value Ref Range    Lactate, Venous 0.8 0.0 - 2.0 mmol/L   Blood Culture    Specimen: Blood   Result Value Ref Range    Culture, Blood or Bone Marrow No Growth 3 Days.    Blood Culture    Specimen: Blood   Result Value Ref Range    Culture, Blood or Bone Marrow No Growth 3 Days.    Rapid SARS-CoV-2 by PCR    Specimen: Nasopharyngeal; Swab   Result Value Ref Range    Rapid SARS-COV-2 by PCR Not Detected Not Detected / Detected / Presumptive Positive / Inhibitors present    Isolation Guidelines      Procedural Comment         EKG Results     EKG Interpretation      Radiology Results     Imaging Results          CT NECK SOFT TISSUE W CONTRAST (Final result)  Result time 09/11/21 12:08:12    Final result                 Impression:      Status post left hemithyroidectomy and isthmusectomy. Large  fluid and gas  collection in the left thyroidectomy bed extending from the level of the  thoracic inlet to the level of the hyoid with surrounding  edema/inflammation, consistent with abscess. Small-volume retropharyngeal  fluid/edema may be connected to the abscess or may be reactive.  Edema/inflammation extends superiorly and involves the posterior  hypopharyngeal wall. Rightward deviation of the hypopharynx, larynx and  cervical esophagus by the abscess which remain grossly patent.    Electronically Signed by: PATTY PETIT MD   Signed on: 9/11/2021 12:08 PM                Narrative:    EXAMINATION: Computed tomography (CT) of the neck with contrast    HISTORY: Neck swelling     TECHNIQUE: CT of the neck was performed following the uneventful  administration of 100 mL of Omnipaque 300 intravenous contrast according to  standard protocol.    COMPARISON: Comparison is made with a neck CT from 9/25/2017.    FINDINGS:    Postsurgical changes of left hemithyroidectomy and isthmusectomy are seen.  There is a large fluid and gas collection in the left thyroidectomy bed  extending inferiorly to the level of the thoracic inlet and superiorly to  the level of the hyoid measuring 6.8 x 7.4 x 7.0 cm. Edema/inflammation is  seen surrounding this collection extending along the superior margin of the  hyoid to involve the posterior left hypopharyngeal wall. There is also  small volume retropharyngeal edema/fluid measuring up to 0.5 cm in  thickness which may be connected to the larger fluid collection. The  hypopharynx, larynx and cervical esophagus are deviated to the right and  remain grossly patent.    Subcentimeter hypodense nodules are noted in the right thyroid lobe.  Multiple subcentimeter lymph nodes are scattered in the neck, none of which  are pathologically enlarged or abnormally enhancing. There is  atherosclerotic disease involving the carotid bifurcations. The internal  jugular veins are patent. The parotid and  submandibular glands are normal.    Mild degenerative changes are seen in the spine. The visualized portions of  the posterior fossa appear normal. Minutes retention cysts are seen in the  paranasal sinuses. The left mastoid air cells are underpneumatized. The  visualized lung apices are clear.                                ED Medication Orders (From admission, onward)    Ordered Start     Status Ordering Provider    09/11/21 1646 09/11/21 1647  ketorolac injection 30 mg  ONCE         Last MAR action: Given ETIENNE SOLOMON          ED Course as of Sep 15 1708   Sat Sep 11, 2021   1230 The CT scan of the neck resulted with hematoma vs abscess. This has been read by Dr. Solomon at 12:30 PM, and resulted out by radiologist at 12:08 PM. The CT scan according to radiology is consisted with a large post operative abscess. The patient did not have any SERS criteria at the time of evaluation. The differential diagnosis was more likely hematoma than abscess. The CT scan now confirming abscess and broad spectrum of antibiotics will be initiated within the window for bacterial infection.    [GM]   1253 Discussed case in detail with Dr. Lemon. Physician accepts admission.    [GM]   1256 Consult with ID, Dr. Richards. Physician accepts consult.    [GM]   1258 Reviewed diagnostic results with the patient, as well as plans for admission. Patient understands and agrees to admission. Patient states he is okay with being admitted the hospital I recommend.     [GM]   1403 Spoke with [facility], will call back.    [GM]   1557 Call back from the patient's ENT surgeon, Dr. Cox, from [facility] who states he has reviewed the patient's CT and state he removed a 9 cm mass and told patient to expect post op swelling consistent with what he is seeing on CT. He states this has appearance of ceroma and not abscess. States he has reviewed my notes and CT scan and is comfortable with the patient following up as an out patient at his  office. Physician states the patient does not need antibiotics.    [GM]   1604 Reviewed diagnostic results with the patient, as well as plans for disposition. Patient agrees to discharge home and understands need for follow-up on an outpatient basis.    [GM]   1615 Spoke with Dr. Lemon and updated physician on the patient's surgeon's recommendation.    [GM]      ED Course User Index  [GM] Oskar Drake DO       MDM  Number of Diagnoses or Management Options  Postoperative seroma involving endocrine system after endocrine system procedure  Diagnosis management comments: The  patient complains of swelling in his neck that began 2 days ago and increasing. The patient is status post operative partial thyroidectomy.     The differential diagnoses include post operative seroma, consider other etiology, r/o abscess and hematoma.    Will use diagnostic tests, serial examinations, and consults as appropriate to assist in differential diagnoses, tx, and disposition.      Clinical Impression     ED Diagnosis   1. Postoperative seroma involving endocrine system after endocrine system procedure         Disposition        Discharge 9/11/2021  4:00 PM  Telemetry Bed?: Yes  Admitting Physician: JOSE DANIEL CURTIS [[id number]]  Is this a telephone or verbal order?: This is a telephone order from the admitting physician                     Oskar Drake DO  09/15/21 2469     2 = A lot of assistance

## 2025-03-24 NOTE — CONSULT NOTE ADULT - CONVERSATION DETAILS
Patient's son and reported HCP, Yuri Wangmaribel, was called with regards to goals of care and advance care planning.    We introduced ourselves (Dr. Ochoa and myself) as members of the palliative team and purpose for consult. Yuri is familiar with palliative care and has experience with palliative care team and hospice team as well for his mom in the past.    Yuri also states that there was no call to discuss medical update of patient. He is aware that she was found down and brought to the ER. We provided update to Yuri in terms of imaging reports and how the patient currently appears after we saw her in the examination room. Yuri states that this is the patient's baseline (minimally verbal, feed assist, bedbound,  'not functioning'). Imaging reports have largely been unremarkable and we conveyed those findings to Yuri.    We discussed MOLST form as well; patient has an old MOLST form in chart and we called to re-confirm. Yuri reconfirms that patient is DNR. He would like DNI and comfort measures at this time. He is familiar with hospice care and happened to introduce hospice care into conversation himself requesting it for his mom. According to Yuri, the patient has been under hospice care twice in the past 1-2 years and 'graduated.' Yuri believes she is eligible again and would like to pursue that route. We discussed additional comfort measures that include whether or not to continue blood work, IV fluids, and abx; Yuri agrees to hold these interventions if not concordant with comfort care.    At this time, we discussed that we will forward patient's case to case management to set up hospice referral. We discussed that hospice and case mgmt/social work will likely call patient later to confirm and obtain additional details as required.    Patient would likely be admitted until hospice has accepted and decision made from hospice end point. We discussed potentially requiring medications for agitation such as haldol if needed which son agrees to. He does mention that she does not experience any agitation at baseline and rather undercomplains.     Patient also states that he frequently travels and it is ok to call his wife if he is unreachable. Wife is Glenna. Phone number [phone].

## 2025-03-24 NOTE — H&P ADULT - HISTORY OF PRESENT ILLNESS
89 y.o. F with pmhx of Alzheimer's disease, HTN, MDD BIBEMS from [facility] [place] s/p being found down on the floor. Per ems, when nurses were rounding this am, patient was found on the floor. She is noted to have left shin abrasions. Fall was unwitnessed and it unknown what time it occurred at if there was any loc .

## 2025-03-24 NOTE — PATIENT PROFILE ADULT - FALL HARM RISK - HARM RISK INTERVENTIONS

## 2025-03-24 NOTE — CONSULT NOTE ADULT - PROBLEM SELECTOR RECOMMENDATION 2
HCP: Yuri (son). HCP form not in chart at this time. Son reports that it is with [facility].  ROSANNEST form completed. DNR/DNI. Comfort measures.  Goals of care discussion had. Plan is to pursue comfort measures and hospice care.

## 2025-03-24 NOTE — CONSULT NOTE ADULT - SUBJECTIVE AND OBJECTIVE BOX
Date of Service 25 @ 13:26     Reason for Consultation:	[] Pain		[x] Goals of Care		[] Non-pain symptoms    [] End of life discussion		[x] Other: hospice referral    HPI:    Ms. Smith is an 89 yo F with previous medical history of Alzheimers dementia, HTN, and recurrent falls. Patient was recently discharged from hospital Mar 10 for fall at that time as well. Patient lives at [facility] and was found down    Interval History:       PERTINENT PM/SXH:   Alzheimer disease      No significant past surgical history    History of total knee arthroplasty, left    H/O total hip arthroplasty, right      FAMILY HISTORY:  No pertinent family history in first degree relatives        Family Hx substance abuse [ ]yes [x ]no    ITEMS NOT CHECKED ARE NOT PRESENT    SOCIAL HISTORY:   Significant other/partner[ ]  Children[ ]  Mu-ism/Spirituality:  Substance hx:  [ ]   Tobacco hx:  [ ]   Alcohol hx: [ ]   Home Opioid hx:  [ ] I-Stop Reference No:  Living Situation: [ ]Home  [ ]Long term care  [ ]Rehab [ ]Other  Home Services: [ ] HHA [ ] Visting RN [ ] Hospice      ADVANCE DIRECTIVES:    MOLST  [ ]  Living Will  [ ]   DECISION MAKER(s):  [ ] Health Care Proxy(s)  [ ] Surrogate(s)  [ ] Guardian           Name(s): Phone Number(s):    BASELINE (I)ADL(s) (prior to admission):  [place]: [ ]Total  [ ] Moderate [ ]Dependent    Allergies    No Known Allergies    Intolerances    MEDICATIONS  (STANDING):    MEDICATIONS  (PRN):  haloperidol    Injectable 1 milliGRAM(s) IV Push every 2 hours PRN Agitation        ITEMS UNCHECKED ARE NOT PRESENT     PRESENT SYMPTOMS: [ ]Unable to self-report due to altered mental status  [ ] CPOT [ ] PAINADs [ ] RDOS  Source if other than patient:  [ ]Family   [ ]Team     Pain: [ ]yes [ ]no  QOL impact -   Location -                    Aggravating factors -  Quality -  Radiation -  Timing-  Severity (0-10 scale):  Minimal acceptable level / Pain goal (0-10 scale):     CPOT:    https://www.s[facility].org/getattachment/[email or web address]/Critical-Care-Pain-Observation-Tool-(CPOT)    Dyspnea:                           [ ]Mild [ ]Moderate [ ]Severe  Anxiety:                             [ ]Mild [ ]Moderate [ ]Severe  Agitation:                          [ ]Mild [ ]Moderate [ ]Severe  Fatigue:                             [ ]Mild [ ]Moderate [ ]Severe  Nausea:                             [ ]Mild [ ]Moderate [ ]Severe  Loss of appetite:              [ ]Mild [ ]Moderate [ ]Severe  Constipation:                   [ ]Mild [ ]Moderate [ ]Severe  Diarrhea:                          [ ]Mild [ ]Moderate [ ]Severe  Pruritus:                            [ ]Mild [ ]Moderate [ ]Severe  Depression:                      [ ]Mild [ ]Moderate [ ]Severe    PCSSQ[Palliative Care Spiritual Screening Question]   Severity (0-10):  Score of 4 or > indicate consideration of Chaplaincy referral.  Chaplaincy Referral: [ ] yes [ ] refused [ ] following [ x] deferred    Caregiver [place]? : [ ] yes [ ] no [ ] Declined   [x ] Deferred            Social work referral [ ] Patient & Family Centered Care Referral [ ]     Anticipatory Grief present?:  [ ] yes [ ] no  [x ] Deferred                  Social work referral [ ] Chaplaincy Referral[ ]    Other Symptoms:  [ ]All other review of systems negative   [ ] Unable to obtain due to poor mentation     PHYSICAL EXAM:  Vital Signs Last 24 Hrs  T(C): 37 (24 Mar 2025 06:19), Max: 37 (24 Mar 2025 06:19)  T(F): 98.6 (24 Mar 2025 06:19), Max: 98.6 (24 Mar 2025 06:19)  HR: 80 (24 Mar 2025 11:42) (80 - 104)  BP: 135/72 (24 Mar 2025 11:42) (129/85 - 166/94)  BP(mean): --  RR: 18 (24 Mar 2025 11:42) (18 - 20)  SpO2: 98% (24 Mar 2025 11:42) (95% - 98%)    Parameters below as of 24 Mar 2025 11:42  Patient On (Oxygen Delivery Method): room air         I&O's Summary      GENERAL:  [ ]Alert  [ ]Oriented x   [ ]Lethargic  [ ]Cachexia  [ ]Unarousable  [ ]Verbal  [ ]Non-Verbal  [ ] No Distress  Behavioral:   [ ] Anxiety  [ ] Delirium [ ] Agitation [ ] Calm  [ ] Other  HEENT:  [ ]Normal  [ ] Temporal Wasting  [ ]Dry mouth   [ ]ET Tube/Trach  [ ]Oral lesions  [ ] Mucositis  PULMONARY:   [ ]Clear [ ]Tachypnea  [ ]Audible excessive secretions   [ ]Rhonchi        [ ]Right [ ]Left [ ]Bilateral  [ ]Crackles        [ ]Right [ ]Left [ ]Bilateral  [ ]Wheezing     [ ]Right [ ]Left [ ]Bilateral  [ ]Diminished breath sounds [ ]right [ ]left [ ]bilateral  CARDIOVASCULAR:    [ ]Regular [ ]Irregular [ ]Tachy  [ ]Eloy [ ]Murmur [ ]Other  GASTROINTESTINAL:  [ ]Soft  [ ]Distended   [ ]+BS  [ ]Non tender [ ]Tender  [ ]PEG [ ]OGT/ NGT  Last BM:   GENITOURINARY:  [ ]Normal [ ] Incontinent   [ ]Oliguria/Anuria   [ ]Coleman  MUSCULOSKELETAL:   [ ]Normal   [ ]Weakness  [ ]Bed/Wheelchair bound [ ]Edema  [  ] amputation  [  ] contraction  NEUROLOGIC:   [ ]No focal deficits  [ ]Cognitive impairment  [ ]Dysphagia [ ]Dysarthria [ ]Paresis [ ]Other   SKIN: See Nursing Skin Assessment for further details  [ ]Normal    [ ]Rash  [ ]Pressure ulcer(s)       Present on admission [ ]y [ ]n   [  ]  Wound    [  ] hyperpigmentation    CRITICAL CARE:  [ ] Shock Present  [ ]Septic [ ]Cardiogenic [ ]Neurologic [ ]Hypovolemic  [ ]  Vasopressors [ ]  Inotropes   [ ]Respiratory failure present [ ]Mechanical ventilation [ ]Non-invasive ventilatory support [ ]High flow    [ ]Acute  [ ]Chronic [ ]Hypoxic  [ ]Hypercarbic [ ]Other  [ ]Other organ failure     LABS:  reviewed                         13.0   8.16  )-----------( 199      ( 24 Mar 2025 06:43 )             40.6   03-    142  |  106  |  17  ----------------------------<  128[H]  4.4   |  22  |  0.44[L]    Ca    9.5      24 Mar 2025 06:43    TPro  7.7  /  Alb  4.1  /  TBili  0.5  /  DBili  x   /  AST  13  /  ALT  20  /  AlkPhos  103  03-24  PT/INR - ( 24 Mar 2025 06:43 )   PT: 12.4 sec;   INR: 1.08 ratio         PTT - ( 24 Mar 2025 06:43 )  PTT:29.7 sec  Urinalysis Basic - ( 24 Mar 2025 08:27 )    Color: Yellow / Appearance: Clear / S.023 / pH: x  Gluc: x / Ketone: 15 mg/dL  / Bili: Negative / Urobili: 0.2 mg/dL   Blood: x / Protein: Trace mg/dL / Nitrite: Positive   Leuk Esterase: Moderate / RBC: 2 /HPF / WBC 27 /HPF   Sq Epi: x / Non Sq Epi: 1 /HPF / Bacteria: Too Numerous to count /HPF      CAPILLARY BLOOD GLUCOSE          RADIOLOGY & ADDITIONAL STUDIES: reviewed     PROTEIN CALORIE MALNUTRITION PRESENT: [ ]mild [ ]moderate [ ]severe [ ]underweight [ ]morbid obesity  https://www.andeal.org/vault/2440/web/files/ONC/Table_Clinical%20Characteristics%20to%20Document%20Malnutrition-White%20JV%20et%20al%882898.pdf    Height (cm): 157.5 (25 @ 06:07), 157.5 (25 @ 09:04)  Weight (kg): 59 (25 @ 06:07), 61.2 (25 @ 09:04), 59 (07-15-24 @ 18:54)  BMI (kg/m2): 23.8 (25 @ 06:07), 24.7 (25 @ 09:04)    [ ]PPSV2 < or = to 30% [ ]significant weight loss  [ ]poor nutritional intake  [ ]anasarca [ ]Artificial Nutrition      REFERRALS:   [ ]Chaplaincy  [ ]Hospice  [ ]Child Life  [ ]Social Work  [ ]Case management [ ]Holistic Therapy     Goals of Care Document:  Date of Service 25 @ 13:26     Reason for Consultation:	[] Pain		[x] Goals of Care		[] Non-pain symptoms    [] End of life discussion		[x] Other: hospice referral    HPI:    Ms. Smith is an 88 yo F with previous medical history of Alzheimers dementia, HTN, and recurrent falls. Patient was recently discharged from hospital Mar 10 for fall at that time as well. Patient lives at [facility] and was found down on floor with bruise and abrasion on left shin. Fall was unwitnessed. Unknown if head trauma and loc. Patient is poor historian.    Interval History:     Patient seen and examined today bedside in the ER. Patient was unable to answer any questions on her own. Patient was arousable and minimally verbal. Likely not comprehending questions. Frequently answers 'no.' Son who is reported HCP was called for goal of care discussion (see goc section below).    PERTINENT PM/SXH:   Alzheimer disease    No significant past surgical history    History of total knee arthroplasty, left    H/O total hip arthroplasty, right      FAMILY HISTORY:  No pertinent family history in first degree relatives        Family Hx substance abuse [ ]yes [x ]no    ITEMS NOT CHECKED ARE NOT PRESENT    SOCIAL HISTORY:   Significant other/partner[ ]  Children[x]  Advent/Spirituality: unknown  Substance hx:  [ ]   Tobacco hx:  [ ]   Alcohol hx: [ ]   Home Opioid hx:  [ ] I-Stop Reference No:  Living Situation: [ ]Home  [ ]Long term care  [ ]Rehab [x ]Other; assisted living, [facility].  Home Services: [ ] HHA [ ] Josh RN [ ] Hospice      ADVANCE DIRECTIVES:    MOLST  [ x]  Living Will  [ ]   DECISION MAKER(s): Yuri Smith (son)  [x ] Health Care Proxy(s)  [ ] Surrogate(s)  [ ] Guardian           Name(s): Yuri Smith. Phone Number(s): [phone]    BASELINE (I)ADL(s) (prior to admission):  [place]: [ ]Total  [ ] Moderate [x]Dependent    Allergies    No Known Allergies    Intolerances    MEDICATIONS  (STANDING):    MEDICATIONS  (PRN):  haloperidol    Injectable 1 milliGRAM(s) IV Push every 2 hours PRN Agitation        ITEMS UNCHECKED ARE NOT PRESENT     PRESENT SYMPTOMS: [x]Unable to self-report due to altered mental status  [ ] CPOT [ ] PAINADs [ ] RDOS  Source if other than patient:  [x ]Family   [x ]Team     Pain: [ ]yes [ ]no  QOL impact -   Location -                    Aggravating factors -  Quality -  Radiation -  Timing-  Severity (0-10 scale):  Minimal acceptable level / Pain goal (0-10 scale):     CPOT:    https://www.[facility].org/getattachment/[email or web address]/Critical-Care-Pain-Observation-Tool-(CPOT)    Dyspnea:                           [ ]Mild [ ]Moderate [ ]Severe  Anxiety:                             [ ]Mild [ ]Moderate [ ]Severe  Agitation:                          [ ]Mild [ ]Moderate [ ]Severe  Fatigue:                             [ ]Mild [ ]Moderate [ ]Severe  Nausea:                             [ ]Mild [ ]Moderate [ ]Severe  Loss of appetite:              [ ]Mild [ ]Moderate [ ]Severe  Constipation:                   [ ]Mild [ ]Moderate [ ]Severe  Diarrhea:                          [ ]Mild [ ]Moderate [ ]Severe  Pruritus:                            [ ]Mild [ ]Moderate [ ]Severe  Depression:                      [ ]Mild [ ]Moderate [ ]Severe    PCSSQ[Palliative Care Spiritual Screening Question]   Severity (0-10):  Score of 4 or > indicate consideration of Chaplaincy referral.  Chaplaincy Referral: [ ] yes [ ] refused [ ] following [ x] deferred    Caregiver [place]? : [ ] yes [x ] no [ ] Declined   [ ] Deferred            Social work referral [ ] Patient & Family Centered Care Referral [ ]     Anticipatory Grief present?:  [ ] yes [ x] no  [ ] Deferred                  Social work referral [ ] Chaplaincy Referral[ ]    Other Symptoms:  [ ]All other review of systems negative   [x ] Unable to obtain due to poor mentation     PHYSICAL EXAM:  Vital Signs Last 24 Hrs  T(C): 37 (24 Mar 2025 06:19), Max: 37 (24 Mar 2025 06:19)  T(F): 98.6 (24 Mar 2025 06:19), Max: 98.6 (24 Mar 2025 06:19)  HR: 80 (24 Mar 2025 11:42) (80 - 104)  BP: 135/72 (24 Mar 2025 11:42) (129/85 - 166/94)  BP(mean): --  RR: 18 (24 Mar 2025 11:42) (18 - 20)  SpO2: 98% (24 Mar 2025 11:42) (95% - 98%)    Parameters below as of 24 Mar 2025 11:42  Patient On (Oxygen Delivery Method): room air         I&O's Summary      GENERAL:  [x ]Alert  [x ]Oriented x0   [ ]Lethargic  [ ]Cachexia  [ ]Unarousable  [x ]Verbal  [ ]Non-Verbal  [x ] No Distress  Behavioral:   [ ] Anxiety  [ ] Delirium [ ] Agitation [x ] Calm  [ ] Other  HEENT:  [ ]Normal  [ ] Temporal Wasting  [ ]Dry mouth   [ ]ET Tube/Trach  [ ]Oral lesions  [ ] Mucositis  PULMONARY:   [x ]Clear [ ]Tachypnea  [ ]Audible excessive secretions   [ ]Rhonchi        [ ]Right [ ]Left [ ]Bilateral  [ ]Crackles        [ ]Right [ ]Left [ ]Bilateral  [ ]Wheezing     [ ]Right [ ]Left [ ]Bilateral  [ ]Diminished breath sounds [ ]right [ ]left [ ]bilateral  CARDIOVASCULAR:    [x ]Regular [ ]Irregular [ ]Tachy  [ ]Eloy [ ]Murmur [ ]Other  GASTROINTESTINAL:  [x ]Soft  [ ]Distended   [x ]+BS  [x ]Non tender [ ]Tender  [ ]PEG [ ]OGT/ NGT  Last BM:   GENITOURINARY:  [ ]Normal [x ] Incontinent   [ ]Oliguria/Anuria   [ ]Coleman  MUSCULOSKELETAL:   [ ]Normal   [ ]Weakness  [x ]Bed/Wheelchair bound [ ]Edema  [  ] amputation  [  ] contraction  NEUROLOGIC:   [ ]No focal deficits  [x ]Cognitive impairment  [ ]Dysphagia [ ]Dysarthria [ ]Paresis [ ]Other   SKIN: See Nursing Skin Assessment for further details  [ ]Normal    [ ]Rash  [ ]Pressure ulcer(s)       Present on admission [x ]y [ ]n   [x  ]  Wound    [  ] hyperpigmentation    CRITICAL CARE:  [ ] Shock Present  [ ]Septic [ ]Cardiogenic [ ]Neurologic [ ]Hypovolemic  [ ]  Vasopressors [ ]  Inotropes   [ ]Respiratory failure present [ ]Mechanical ventilation [ ]Non-invasive ventilatory support [ ]High flow    [ ]Acute  [ ]Chronic [ ]Hypoxic  [ ]Hypercarbic [ ]Other  [ ]Other organ failure     LABS:  reviewed                         13.0   8.16  )-----------( 199      ( 24 Mar 2025 06:43 )             40.6   -    142  |  106  |  17  ----------------------------<  128[H]  4.4   |  22  |  0.44[L]    Ca    9.5      24 Mar 2025 06:43    TPro  7.7  /  Alb  4.1  /  TBili  0.5  /  DBili  x   /  AST  13  /  ALT  20  /  AlkPhos  103  -  PT/INR - ( 24 Mar 2025 06:43 )   PT: 12.4 sec;   INR: 1.08 ratio         PTT - ( 24 Mar 2025 06:43 )  PTT:29.7 sec  Urinalysis Basic - ( 24 Mar 2025 08:27 )    Color: Yellow / Appearance: Clear / S.023 / pH: x  Gluc: x / Ketone: 15 mg/dL  / Bili: Negative / Urobili: 0.2 mg/dL   Blood: x / Protein: Trace mg/dL / Nitrite: Positive   Leuk Esterase: Moderate / RBC: 2 /HPF / WBC 27 /HPF   Sq Epi: x / Non Sq Epi: 1 /HPF / Bacteria: Too Numerous to count /HPF      CAPILLARY BLOOD GLUCOSE      RADIOLOGY & ADDITIONAL STUDIES:    < from: CT Cervical Spine No Cont (25 @ 07:36) >  IMPRESSION:  CT HEAD: No acute abnormality. Chronic changes as above.  CT CERVICAL SPINE: No acute abnormality. Chronic changes as above.    < end of copied text >  < from: Xray Femur 2 Views, Left (25 @ 07:33) >  IMPRESSION: No acute fracture or dislocation. However, if there is high   clinical concern for fracture or if the patient is unable to bear weight,   MRI is a more sensitive test to evaluate for fracture.    < end of copied text >      PROTEIN CALORIE MALNUTRITION PRESENT: [ ]mild [x ]moderate [ ]severe [ ]underweight [ ]morbid obesity  https://www.andeal.org/vault/2440/web/files/ONC/Table_Clinical%20Characteristics%20to%20Document%20Malnutrition-White%20JV%20et%20al%2020.pdf    Height (cm): 157.5 (25 @ 06:07), 157.5 (25 @ 09:04)  Weight (kg): 59 (25 @ 06:07), 61.2 (25 @ 09:04), 59 (07-15-24 @ 18:54)  BMI (kg/m2): 23.8 (25 @ 06:07), 24.7 (25 @ 09:04)    [x ]PPSV2 < or = to 30% [ ]significant weight loss  [ ]poor nutritional intake  [ ]anasarca [ ]Artificial Nutrition      REFERRALS:   [ ]Chaplaincy  [x ]Hospice  [ ]Child Life  [x ]Social Work  [x ]Case management [ ]Holistic Therapy

## 2025-03-24 NOTE — ED ADULT NURSE NOTE - OBJECTIVE STATEMENT
88 y/o female presents to the ED BIBEMS from the [place] for unwitnessed mechanical fall. A/Ox1 (aware of name), as per EMS this is patient's baseline mental status. PMH: Dementia and HTN. As per EMS, upon morning rounds at 5am patient was found on the ground. Endorses unknown LOC, AC use and headstrike. As per EMS, patient is noted to have frequent falls. Bruises in multiple stages of healing noted to the R neck and L side of face. Upon assessment, L shin avulsion noted with no active bleeding. Telfa dressing applied. Safety and comfort provided.

## 2025-03-24 NOTE — CONSULT NOTE ADULT - PROBLEM SELECTOR RECOMMENDATION 9
Patient is asymptomatic at this time  Appears calm and not in any acute distress  We will start referral for hospice care

## 2025-03-24 NOTE — CONSULT NOTE ADULT - PROBLEM SELECTOR RECOMMENDATION 3
History of recurrent falls  Recent discharge from [facility] unit Mar 10  Unknown cause. Was found down.  Imaging including CT head and c-spine and xray lower extremity negative for acute findings  No further imaging and/or blood draws unless concordant with goal of care (comfort care).

## 2025-03-24 NOTE — ED PROVIDER NOTE - CLINICAL SUMMARY MEDICAL DECISION MAKING FREE TEXT BOX
89 y.o. F with pmhx of Alzheimer's disease, HTN, MDD BIBEMS from [facility] [place] s/p being found down on the floor. Per ems, when nurses were rounding this am, patient was found on the floor. She is noted to have left shin abrasions. Fall was unwitnessed and it unknown what time it occurred at if there was any loc. Pt is a poor historian and is not able to provide any additional information. Per paperwork with her, not on any AC.   concern for rhabdo given unknown downtime, will obtain CPK. concern for fracture/dislocation iso fall and skin avulsion, will assess w/ xray hip w/ pelvis/ femur/tibia/fibula. will obtain labs for any electrolyte abnormalities and pain control w/ tylenol. 89 y.o. F with pmhx of Alzheimer's disease, HTN, MDD BIBEMS from [facility] [place] s/p being found down on the floor. Per ems, when nurses were rounding this am, patient was found on the floor. She is noted to have left shin abrasions. Fall was unwitnessed and it unknown what time it occurred at if there was any loc. Pt is a poor historian and is not able to provide any additional information. Per paperwork with her, not on any AC.   concern for rhabdo given unknown downtime, will obtain CPK. concern for fracture/dislocation iso fall and skin avulsion, will assess w/ xray hip w/ pelvis/ femur/tibia/fibula. will obtain labs for any electrolyte abnormalities and pain control w/ tylenol.  Attending Yamila Lopez: 89 yo female with from nursing facility with h/o alzheimers presenting after unwitnessed fall. pt with history of prior falls. upon arrival pt with bruising of different stages to face and abrasion to extremities. pt moving all extremities. pt unclear how she fall. pt afebrile rectally. will try to discuss with family and facility for further history. MOLST form present upon arrival and pt dnr/dni. unclear whether mechanical fall. will obtain  labs, ct head. pt with wound to leg. skin very thin and friable unlikely benefit of suturing, will apply ointment and wrap. will re-eval pendin glabs. pt with h/o esbl UTI in the past

## 2025-03-24 NOTE — CONSULT NOTE ADULT - PROBLEM SELECTOR RECOMMENDATION 4
Baseline minimally verbal. Assistance with feeding. Assistance with ambulation (mostly bedbound).  Likely FAST 7c or above

## 2025-03-24 NOTE — ED ADULT NURSE NOTE - NSFALLHARMRISKINTERV_ED_ALL_ED
Assistance OOB with selected safe patient handling equipment if applicable/Assistance with ambulation/Communicate risk of Fall with Harm to all staff, patient, and family/Monitor gait and stability/Monitor for mental status changes and reorient to person, place, and time, as needed/Provide visual cue: red socks, yellow wristband, yellow gown, etc/Reinforce activity limits and safety measures with patient and family/Toileting schedule using arm’s reach rule for commode and bathroom/Use of alarms - bed, stretcher, chair and/or video monitoring/Bed in lowest position, wheels locked, appropriate side rails in place/Call bell, personal items and telephone in reach/Instruct patient to call for assistance before getting out of bed/chair/stretcher/Non-slip footwear applied when patient is off stretcher/[place] to call system/Physically safe environment - no spills, clutter or unnecessary equipment/Purposeful Proactive Rounding/Room/bathroom lighting operational, light cord in reach

## 2025-03-24 NOTE — CONSULT NOTE ADULT - ATTENDING COMMENTS
Pt seen/examined in the presence of fellow (Dr. Baker). Agree w assessment and plan as above w exceptions below:    88yo F w advanced/progressive dementia, total care dependent,  presents to ED following unwitnessed fall at [facility].   No noted fractures, labs largely unremarkable.   Collateral info obtained from pt's son Yuri. Pt was previously on hospice care, graduated. Family are well understanding of her continued clinical decline, highlight her increasing care needs, minimal po intake and notable cognitive decline as pt is no longer communicative.  At this time family wish to implement comfort measures only.     Discussed plan to return to the [facility] services.  Case Management requested to initiate hospice referral (HCN)  MOLST completed to reflect comfort measures only    No current symptoms to address  Will add haldol 0.5mg po q8h PRN restlessness/agitation  cont delirium precautions  comfort feeds ok w aspiration precautions  All questions answered in detail  Support provided      No further Palliative intervention deemed necessary at this time, we will sign off.  Please recall PRN

## 2025-03-24 NOTE — CONSULT NOTE ADULT - ASSESSMENT
88 yo F with previous medical history of Alzheimer's dementia, HTN, MDD. Presents from [facility] assisted living for fall. Palliative care consulted for goals of care discussion.

## 2025-03-24 NOTE — ED PROVIDER NOTE - OBJECTIVE STATEMENT
89 y.o. F with pmhx of Alzheimer's disease, HTN, MDD BIBEMS from [facility] at [place] s/p being found down on the floor. Per ems, when nurses were rounding this am, patient was found on the floor. She is noted to have left shin abrasions. Fall was unwitnessed and it unknown what time it occurred at if there was any loc. Pt is a poor historian and is not able to provide any additional information. Per paperwork with her, not on any AC.

## 2025-03-24 NOTE — CONSULT NOTE ADULT - TIME BILLING
Above time spent performing chart review, at bedside, discussing clinical condition with primary care team and consultants, discussing GOC and symptom management with pt;s family and outlining above care plan to ensure goal concordant care.

## 2025-03-24 NOTE — ED ADULT NURSE REASSESSMENT NOTE - NS ED NURSE REASSESS COMMENT FT1
straight catheter inserted using sterile technique. Second RN present to confirm sterility. UA and UC sent to lab. pending results. pt placed back on primafit.

## 2025-03-24 NOTE — H&P ADULT - ASSESSMENT
89 y.o. F with pmhx of Alzheimer's disease, HTN, MDD BIBEMS from [facility] at [place] s/p being found down on the floor.

## 2025-03-24 NOTE — H&P ADULT - PROBLEM SELECTOR PLAN 1
d/w son Yuri- he is declining antibiotic therapy and wants his mother to pass naturally-  agreeable to hospice asap

## 2025-03-24 NOTE — CONSULT NOTE ADULT - CONVERSATION/DISCUSSION
Patient states that she feels that she has a yeast infection   Diagnosis/Prognosis/MOLST Discussed/Hospice Referral/Palliative Care Referral

## 2025-03-24 NOTE — H&P ADULT - NSHPPHYSICALEXAM_GEN_ALL_CORE
Vital Signs Last 24 Hrs  T(C): 37 (24 Mar 2025 06:19), Max: 37 (24 Mar 2025 06:19)  T(F): 98.6 (24 Mar 2025 06:19), Max: 98.6 (24 Mar 2025 06:19)  HR: 80 (24 Mar 2025 11:42) (80 - 104)  BP: 135/72 (24 Mar 2025 11:42) (129/85 - 166/94)  BP(mean): --  RR: 18 (24 Mar 2025 11:42) (18 - 20)  SpO2: 98% (24 Mar 2025 11:42) (95% - 98%)    Parameters below as of 24 Mar 2025 11:42  Patient On (Oxygen Delivery Method): room air        CONSTITUTIONAL: chronically ill  EYES: PERRL; conjunctiva and sclera clear  NECK: Supple,  RESPIRATORY: Normal respiratory effort; lungs are clear to auscultation bilaterally  CARDIOVASCULAR: Regular rate and rhythm, normal S1 and S2, no murmur/rub/gallop; No lower extremity edema; Peripheral pulses are 2+ bilaterally  ABDOMEN: Nontender to palpation, normoactive bowel sounds, no rebound/guarding  PSYCH: nonverbal

## 2025-03-24 NOTE — ED PROVIDER NOTE - PHYSICAL EXAMINATION
Physical Exam:  Gen:  awake, alert,  Head: NCAT  HEENT: Normal conjunctiva, trachea midline, moist mucous membranes  Lung: CTAB, no respiratory distress,   CV: RRR, no murmurs, rubs or gallops  Abd: Soft, non-tender,   MSK: No visible deformities,  Skin: Warm, well perfused, +skin avulsion on left shin. no active bleeding  Psych: appropriate affect and mood Physical Exam:  Gen:  awake, alert,  Head: NCAT  HEENT: Normal conjunctiva, trachea midline, moist mucous membranes  Lung: CTAB, no respiratory distress,   CV: RRR, no murmurs, rubs or gallops  Abd: Soft, non-tender,   MSK: No visible deformities,  Skin: Warm, well perfused, +skin avulsion on left shin. no active bleeding  Psych: appropriate affect and mood  Attending Yamila Lopez: Gen: NAD, heent: ecchymoses to right lateral neck, ecchymoses to left forehead, eomi, perrla, mmm, , neck; nttp, no nuchal rigidity, chest: nttp, no crepitus, cv: rrr, no murmurs, lungs: ctab, abd: soft, nontender, nondistended, no peritoneal signs, , no guarding, ext: wwp, skin avulsion with ecchymsoes to left shin, distal pulses intact, ranging upper and lower extremities, neuro: awake and alert, following commands, speech clear, sensation and strength intact, no focal deficits

## 2025-03-25 ENCOUNTER — TRANSCRIPTION ENCOUNTER (OUTPATIENT)
Age: 89
End: 2025-03-25

## 2025-03-25 VITALS
OXYGEN SATURATION: 93 % | SYSTOLIC BLOOD PRESSURE: 118 MMHG | HEART RATE: 88 BPM | TEMPERATURE: 99 F | RESPIRATION RATE: 18 BRPM | DIASTOLIC BLOOD PRESSURE: 76 MMHG

## 2025-03-25 PROCEDURE — 90715 TDAP VACCINE 7 YRS/> IM: CPT

## 2025-03-25 PROCEDURE — 84295 ASSAY OF SERUM SODIUM: CPT

## 2025-03-25 PROCEDURE — 82550 ASSAY OF CK (CPK): CPT

## 2025-03-25 PROCEDURE — 80053 COMPREHEN METABOLIC PANEL: CPT

## 2025-03-25 PROCEDURE — 84132 ASSAY OF SERUM POTASSIUM: CPT

## 2025-03-25 PROCEDURE — 85610 PROTHROMBIN TIME: CPT

## 2025-03-25 PROCEDURE — 81001 URINALYSIS AUTO W/SCOPE: CPT

## 2025-03-25 PROCEDURE — 73590 X-RAY EXAM OF LOWER LEG: CPT

## 2025-03-25 PROCEDURE — 87086 URINE CULTURE/COLONY COUNT: CPT

## 2025-03-25 PROCEDURE — 82803 BLOOD GASES ANY COMBINATION: CPT

## 2025-03-25 PROCEDURE — 87040 BLOOD CULTURE FOR BACTERIA: CPT

## 2025-03-25 PROCEDURE — 99285 EMERGENCY DEPT VISIT HI MDM: CPT | Mod: 25

## 2025-03-25 PROCEDURE — 82330 ASSAY OF CALCIUM: CPT

## 2025-03-25 PROCEDURE — 90471 IMMUNIZATION ADMIN: CPT

## 2025-03-25 PROCEDURE — 82435 ASSAY OF BLOOD CHLORIDE: CPT

## 2025-03-25 PROCEDURE — 72125 CT NECK SPINE W/O DYE: CPT | Mod: MC

## 2025-03-25 PROCEDURE — 85014 HEMATOCRIT: CPT

## 2025-03-25 PROCEDURE — 99239 HOSP IP/OBS DSCHRG MGMT >30: CPT

## 2025-03-25 PROCEDURE — 70450 CT HEAD/BRAIN W/O DYE: CPT | Mod: MC

## 2025-03-25 PROCEDURE — 82947 ASSAY GLUCOSE BLOOD QUANT: CPT

## 2025-03-25 PROCEDURE — 83605 ASSAY OF LACTIC ACID: CPT

## 2025-03-25 PROCEDURE — 71045 X-RAY EXAM CHEST 1 VIEW: CPT

## 2025-03-25 PROCEDURE — 73502 X-RAY EXAM HIP UNI 2-3 VIEWS: CPT

## 2025-03-25 PROCEDURE — 85025 COMPLETE CBC W/AUTO DIFF WBC: CPT

## 2025-03-25 PROCEDURE — 96375 TX/PRO/DX INJ NEW DRUG ADDON: CPT

## 2025-03-25 PROCEDURE — 73552 X-RAY EXAM OF FEMUR 2/>: CPT

## 2025-03-25 PROCEDURE — 87186 SC STD MICRODIL/AGAR DIL: CPT

## 2025-03-25 PROCEDURE — 96374 THER/PROPH/DIAG INJ IV PUSH: CPT

## 2025-03-25 PROCEDURE — 85730 THROMBOPLASTIN TIME PARTIAL: CPT

## 2025-03-25 PROCEDURE — 85018 HEMOGLOBIN: CPT

## 2025-03-25 RX ADMIN — METOPROLOL SUCCINATE 25 MILLIGRAM(S): 50 TABLET, EXTENDED RELEASE ORAL at 05:28

## 2025-03-25 NOTE — DISCHARGE NOTE NURSING/CASE MANAGEMENT/SOCIAL WORK - CAREGIVER PHONE NUMBER
"      Split-Night Report  [facility]  [address] [place] [address]  Phone: [phone]  Fax: [phone]           Patient Name: Rito Conley Study Date: 10/3/2023   YOB: 1963 Patient MRN: [id number]   Age:  60 year Hospital #: [id number]   Sex: Male Referring Physician: Gael Ibrahim MD   Height: 5' 11" Recording Tech: JuliGene Miguelen RPSGT   Weight: 227.0 lbs Scoring Tech: SAEID Thakkar RRT RPSGT   BMI:  31.8 AASM  1B   Diagnostic AHI: 42.4    Interpreting Physician      RERA index: 4.7   Diagnostic Low O2 sat. 79.0%     Diagnostic RDI: 47.1       Sleep architecture: This was a split night study. During the diagnostic portion of the study, the patient fell asleep in 0.0 minutes. Sleep efficiency was 100.0%. Total sleep time (TST) during the diagnostic portion was 116.0 minutes. REM latency was 136.0 minutes. Sleep architecture in the diagnostic part was significantly disrupted due to underlying sleep apnea.    Respiratory: Moderate snoring was present. The overall AHI was 42.4 with an oxygen timothy of 79.0%.  The central sleep apnea index was 0.5.    Cardiac: single lead EKG revealed normal sinus rhythm     PAP titration:    Mask used in the study: nasal pillows zena MED or large  PAP = 7 cwp was partially effective in supine N2 sleep.  PAP = 9 cwp was largely effective in supine REM sleep.    Oxygenation:  During the diagnostic portion of the study, hypoxemia was only observed in relation to obstructive events.    At therapeutic levels of PAP therapy, there was no baseline hypoxemia.      Impression:  -obstructive sleep apnea     Recommendations:    -initial auto-CPAP settings of CPAP min = 7 cwp  and CPAP max =  10 cwp are recommended  -if the patient has difficulty initiating sleep or has significant sleep disruption at the above pressures, CPAP min should be adjusted to 9cwp or higher depending on pressure tolerance  -the patient has follow up with Sleep " Medicine          Gael Ibrahim MD    (This Sleep Study was interpreted by a Board Certified Sleep Specialist who conducted an epoch-by-epoch review of the entire raw data recording.)  (The indication for this sleep study was reviewed and deemed appropriate by AASM Practice Parameters or other reasons by a Board Certified Sleep Specialist.)      Study Overview    DIAGNOSTIC TREATMENT   First Lights Off: 08:58:13 PM First Lights Off: 12:04:30 AM   Last Lights On: 12:04:30 AM Last Lights On: 05:11:45 AM   Time in Bed: 186.3 Time in Bed: 307.3   Total Sleep Time: 116.0 Total Sleep Time: 242.0   Sleep Efficiency: 62.3% Sleep Efficiency: 78.8%   Sleep Period Time: 170.5 Sleep Period Time: 292.5   Sleep Maintenance Efficiency: 68.0% Sleep Maintenance Efficiency: 82.7%   Sleep Latency: 11.3 Sleep Latency: 12.0   REM Latency from Sleep Onset: 136.0 REM Latency from Sleep Onset: 206.5     DIAGNOSTIC TREATMENT    COUNT INDEX  COUNT INDEX   Awakenings: 8 4.1 Awakenings: 7 1.7   Arousals: 73 37.8 Arousals: 55 13.6   Apneas & Hypopneas: 82 42.4 Apneas & Hypopneas: 35 8.7   Limb Movements: - - Limb Movements: - -   Snores: - - Snores: - -           Desaturations 84  Desaturations 38    Average Oxygen Saturation:  96.3%  Average Oxygen Saturation:  96.8%      Sleep Architecture                      DIAGNOSTIC TREATMENT ENTIRE NIGHT   Stages TIME (mins) % SLEEP TIME TIME (mins) % SLEEP TIME TIME (mins) % SLEEP TIME   WAKE 70.5  65.5  136.0    Stage N1 18.5 15.9% 22.0 9.1% 40.5 11.3%   Stage N2 84.0 72.4% 177.0 73.1% 261.0 72.9%   Stage N3 - 0.0% - 0.0% - 0.0%   REM 13.5 11.6% 43.0 17.8% 56.5 15.8%         Arousal Summary     DIAGNOSTIC TREATMENT    NREM REM Total Sleep Time NREM REM Total Sleep Time   Apnea & Hypopnea Arousals 19 8 27 12 1 13   PLM Arousals - - - - - -   Isolated Limb Movement Arousals - - - - - -   Spontaneous Arousals 42 - 42 34 6 40   RERAs  9 - 9 4 - 4   Total 65 8 73 48 7 55   Arousal Index 38.0 35.6 37.8  14.5 9.8 13.6     Respiratory Summary    RESPIRATORY EVENTS BY SLEEP STAGE   DIAGNOSTIC TREATMENT    NREM REM TOTAL NREM REM TOTAL   Sleep Time (min) 102.5 13.5 116.0 199.0 43.0 242.0            Obstructive Apnea 15 13 28 - - -   Mixed Apnea - - - - - -   Central Apnea - 1 1 - - -   Total Apneas 15 14 29 - - -   Total Apnea Index 8.8 8.8 15.0 - - -   Total Hypopnea 49 17.8 4 34 1 1.4   Total Hypopnea Index 28.7 17.8   17.8 10.3 1.4 1   Apnea & Hypopnea Index 37.5 80.0 42.4 10.3   1.4 8.7   RERAs 9 9 9 4 - 4   RERA Index 5.3 - 4.7 1.2 - 1.0     RESPIRATORY EVENTS BY BODY POSITION   DIAGNOSTIC TREATMENT    SUPINE NON-SUPINE TOTAL SUPINE NON-SUPINE TOTAL   Sleep Time (min) 116.0 116.0 116.0 242.0 - 242.0            Obstructive Apnea 28 - 28 - - -   Mixed Apnea - - - - - -   Central Apnea 1 - 1 - - -   Total Apneas 29 - 29 - - -   Total Apnea Index 15.0 - 15.0 - - -   Total Hypopneas 53 - 53 35 - 35   Total Hypopnea Index 27.4 - 27.4 8.7 - 8.7   AHI 42.4 - 42.4 8.7 - 8.7   RERAs 9 - 9 4 - 4   RERA Index 4.7 - 4.7 1.0 - 1.0         Respiratory Event Durations     DIAGNOSTIC TREATMENT    Apnea Apnea    NREM REM NREM REM   Average (seconds) 18.5 19.8 - -   Maximum (seconds) 23.9 28.0 - -      DIAGNOSTIC TREATMENT    Hypopnea Hypopnea    NREM REM NREM REM   Average (seconds) 29.3 28.6 31.2 26.5   Maximum (seconds) 51.8 36.8 49.5 26.5     Oxygen Saturation Summary     DIAGNOSTIC TREATMENT    WAKE NREM REM WAKE NREM REM   Average OSat (%) 97.0% 96.1% 94.9% 97.0% 96.6% 97.2%   Minimum OSat (%) 89.0% 84.0% 79.0% 89.0% 88.0% 92.0%   Maximum OSat (%) 100.0% 100.0% 100.0% 100.0% 100.0% 100.0%     DIAGNOSTIC                            Oxygen Saturation Distribution    Range(%) Time in range (min) Time in range (%)    90.0 - 100.0 177.3 97.9%   80.0 - 90.0 3.6 2.0%   70.0 - 80.0 0.1 0.1%   60.0 - 70.0 - -   50.0 - 60.0 - -   0.0 - 50.0 - -              Time Spent Less than 88% OSat    Range(%) Time in range (min) Time in range (%)    0.0 - 88.0 2.1 1.2%     # of Desaturations 84   Minimum Oxygen Saturation During Desaturation 79.0%      SUPINE LEFT RIGHT PRONE   Minimum OSat Associated with Longest Apnea 86.0% - - -   Minimum OSat Associated with Longest Hypopnea 94.0% - - -     TREATMENT                            Oxygen Saturation Distribution    Range(%) Time in range (min) Time in range (%)    90.0 - 100.0 304.5 99.9%   80.0 - 90.0 0.4 0.1%   70.0 - 80.0 - -   60.0 - 70.0 - -   50.0 - 60.0 - -   0.0 - 50.0 - -              Time Spent Less than 88% OSat    Range(%) Time in range (min) Time in range (%)   0.0 - 88.0 0.1 0.0%     # of Desaturations 38   Minimum Oxygen Saturation During Desaturation 88.0%      SUPINE LEFT RIGHT PRONE   Minimum OSat Associated with Longest Apnea - - - -   Minimum OSat Associated with Longest Hypopnea 94.0% - - -     Limb Movement Summary          DIAGNOSTIC TREATMENT    COUNT INDEX COUNT INDEX   Isolated Limb Movements - - - -   Periodic Limb Movements (PLMs) - - - -   Total Limb Movements - - - -     Cardiac Summary     DIAGNOSTIC TREATMENT    WAKE NREM REM TOTAL WAKE NREM REM TOTAL    Average Pulse Rate (BPM) 73.8 68.8 68.2 70.6 64.7 61.7 64.7 62.8   Minimum Pulse Rate (BPM) 63.0 58.0 60.0 58.0 56.0 52.0 52.0 52.0   Maximum Pulse Rate (BPM) 90.0 87.0 78.0 90.0 85.0 87.0 81.0 87.0       DIAGNOSTIC     Pulse Rate Distribution    Range(bpm) Time in range (min) Time in range (%)   0.0 - 40.0 - -   40.0 - 60.0 1.3 0.7%   60.0 - 80.0 175.6 97.0%   80.0 - 100.0 4.2 2.3%   100.0 - 120.0 - -   120.0 - 140.0 - -   140.0 - 200.0 - -       TREATMENT     Pulse Rate Distribution    Range(bpm) Time in range (min) Time in range (%)   0.0 - 40.0 - -   40.0 - 60.0 100.7 33.0%   60.0 - 80.0 204.0 66.8%   80.0 - 100.0 0.8 0.3%   100.0 - 120.0 - -   120.0 - 140.0 - -   140.0 - 200.0 - -               Titration Summary      PAP Device PAP Level O2 Level Time (min) Wake (min) NREM (min) REM (min) Sleep Eff% OA# CA# MA# Hyp# Hyp #  (w/Ar/Ds) AHI AHI   (Hyp w/Ar/Ds) RERA RDI Min OSat LM# LM Index AR# AR Index   - Off - 186.5 70.5 102.5 13.5 62.2% 28 1 - 53 42 42.4 36.7 9 47.1 79.0 - - 73 37.8   CPAP 5 - 55.0 12.5 42.5 0.0 77.3% - - - 3 2 4.2 2.8 - 4.2 94.0 - - 16 22.6   CPAP 6 - 48.5 6.0 42.5 0.0 87.6% - - - 9 8 12.7 11.3 2 15.5 88.0 - - 10 14.1   CPAP 7 - 109.0 39.5 69.5 0.0 63.8% - - - 21 17 18.1 14.7 2 19.9 90.0 - - 20 17.3   CPAP 8 - 7.0 0.0 6.0 1.0 100.0% - - - 1 1 8.6 8.6 - 8.6 92.0 - - - -   CPAP 9 - 20.5 4.0 1.5 15.0 80.5% - - - 1 1 3.6 3.6 - 3.6 94.0 - - 5 18.2   CPAP 10 - 67.5 3.5 37.0 27.0 94.8% - - - - - - - - - 95.0 - - 4 3.8                                  n/a

## 2025-03-25 NOTE — DISCHARGE NOTE NURSING/CASE MANAGEMENT/SOCIAL WORK - PATIENT PORTAL LINK FT
You can access the FollowMyHealth Patient Portal offered by [facility] by registering at the following website: [email or web address]. By joining [employer]’s FollowMyHealth portal, you will also be able to view your health information using other applications (apps) compatible with our system.

## 2025-03-25 NOTE — DISCHARGE NOTE PROVIDER - NSDCCPCAREPLAN_GEN_ALL_CORE_FT
PRINCIPAL DISCHARGE DIAGNOSIS  Diagnosis: UTI (urinary tract infection)  Assessment and Plan of Treatment: GOC discussed> proceed with comfort care, HCP accepted, plan for hospice care at [facility]

## 2025-03-25 NOTE — DISCHARGE NOTE PROVIDER - NSDCMRMEDTOKEN_GEN_ALL_CORE_FT
acetaminophen 325 mg oral tablet: 2 tab(s) orally 3 times a day  ascorbic acid 500 mg oral tablet: 1 tab(s) orally once a day  Balmex Adult Care 11.3% Cream: apply topically to Buttocks every 2 hours as needed  citalopram 10 mg oral tablet: 1 tab(s) orally once a day  cyanocobalamin 1000 mcg oral tablet: 1 tab(s) orally once a day  metoprolol succinate 25 mg oral tablet, extended release: 1 tab(s) orally once a day  Multivitamin Tablet: 1 tablet orally once a day  Systane 0.3%-0.4% Eye Drop: 1 drop instilled in each eye 3 times a day

## 2025-03-25 NOTE — DISCHARGE NOTE PROVIDER - CARE PROVIDER_API CALL
Hannah Tracy  Internal Medicine  [address] [place] [address] [place] [address]  Phone: [phone]  Fax: [phone]  Established Patient  Follow Up Time: 1 week

## 2025-03-25 NOTE — DISCHARGE NOTE NURSING/CASE MANAGEMENT/SOCIAL WORK - FINANCIAL ASSISTANCE
[facility] provides services at a reduced cost to those who are determined to be eligible through [facility]’s financial assistance program. Information regarding [facility]’s financial assistance program can be found by going to [email or web address] or by calling [phone].

## 2025-03-25 NOTE — DISCHARGE NOTE NURSING/CASE MANAGEMENT/SOCIAL WORK - NSDCVIVACCINE_GEN_ALL_CORE_FT
Tdap; 03-Mar-2025 09:39; Darlyn Mccartney (RN); [employer]; [id number] (Exp. Date: 30-Apr-2026); IntraMuscular; Deltoid Right.; 0.5 milliLiter(s); VIS (VIS Published: 09-May-2013, VIS Presented: 03-Mar-2025);   Tdap; 24-Mar-2025 06:55; Kristi Martinez (ELO); [employer]; [id number] (Exp. Date: 01-Apr-2026); IntraMuscular; Deltoid Right.; 0.5 milliLiter(s); VIS (VIS Published: 09-May-2013, VIS Presented: 24-Mar-2025);

## 2025-03-25 NOTE — DISCHARGE NOTE NURSING/CASE MANAGEMENT/SOCIAL WORK - NSDCPEFALRISK_GEN_ALL_CORE
For information on Fall & Injury Prevention, visit: [email or web address] OR  [email or web address] OR  https://www.cdc.gov/steadi/patient.html

## 2025-03-25 NOTE — DISCHARGE NOTE PROVIDER - HOSPITAL COURSE
89 y.o. F with pmhx of Alzheimer's disease, HTN, MDD BIBEMS from [facility] [place] s/p being found down on the floor.    Patient seen and examined at bedside. She is comfortable. Patient's care d/w [facility] MD Dr. Tracy, who has approved her to hospice at [facility]. Family / son declining antibiotic therapy and wants his mother to pass naturally agreeable to hospice asap.    medically stable for d/c 89 y.o. F with pmhx of Alzheimer's disease, HTN, MDD BIBEMS from [facility] [place] s/p being found down on the floor.    Patient seen and examined at bedside. She is comfortable. Patient's care d/w [facility] MD Dr. Tracy, who has approved her to hospice at [facility]. Family / son declining antibiotic therapy and wants his mother to pass naturally agreeable to hospice asap.    medically stable for d/c.

## 2025-03-25 NOTE — DISCHARGE NOTE NURSING/CASE MANAGEMENT/SOCIAL WORK - NSFLUVACAGEDISCH_IMM_ALL_CORE
Adult NPO, Peptamen Jr 1.5kcal 240 cc over 1 hr @ 0900, 1300, 1600 and 1900 via NGT with 60 cc water flush after each bolus feed. Water 80 cc/hr continuous from 0000 - 0600.   - Zantac 30 mg oral via NGT BID

## 2025-06-16 NOTE — DIETITIAN INITIAL EVALUATION ADULT. - ENERGY INTAKE
Price (Do Not Change): 0.00 Instructions: This plan will send the code FBSE to the PM system.  DO NOT or CHANGE the price. Detail Level: Generalized Good (>75%)